# Patient Record
Sex: FEMALE | Race: WHITE | NOT HISPANIC OR LATINO | Employment: OTHER | URBAN - METROPOLITAN AREA
[De-identification: names, ages, dates, MRNs, and addresses within clinical notes are randomized per-mention and may not be internally consistent; named-entity substitution may affect disease eponyms.]

---

## 2022-09-20 ENCOUNTER — HOSPITAL ENCOUNTER (INPATIENT)
Facility: HOSPITAL | Age: 82
LOS: 3 days | Discharge: NURSING, REHAB OR LONG-TERM CARE | DRG: 522 | End: 2022-09-23
Attending: EMERGENCY MEDICINE | Admitting: INTERNAL MEDICINE
Payer: MEDICARE

## 2022-09-20 DIAGNOSIS — S72.002A CLOSED DISPLACED FRACTURE OF LEFT FEMORAL NECK: Primary | ICD-10-CM

## 2022-09-20 DIAGNOSIS — S72.032A CLOSED DISPLACED MIDCERVICAL FRACTURE OF LEFT FEMUR, INITIAL ENCOUNTER: ICD-10-CM

## 2022-09-20 DIAGNOSIS — S72.002A CLOSED FRACTURE OF LEFT HIP, INITIAL ENCOUNTER: Primary | ICD-10-CM

## 2022-09-20 DIAGNOSIS — Z01.818 PREOP EXAMINATION: ICD-10-CM

## 2022-09-20 LAB
ALBUMIN SERPL-MCNC: 3.6 GM/DL (ref 3.4–4.8)
ALBUMIN/GLOB SERPL: 0.9 RATIO (ref 1.1–2)
ALP SERPL-CCNC: 65 UNIT/L (ref 40–150)
ALT SERPL-CCNC: 9 UNIT/L (ref 0–55)
APTT PPP: 34.5 SECONDS (ref 23.4–33.9)
AST SERPL-CCNC: 15 UNIT/L (ref 5–34)
BASOPHILS # BLD AUTO: 0.05 X10(3)/MCL (ref 0–0.2)
BASOPHILS NFR BLD AUTO: 0.4 %
BILIRUBIN DIRECT+TOT PNL SERPL-MCNC: 1.1 MG/DL
BUN SERPL-MCNC: 21.9 MG/DL (ref 9.8–20.1)
CALCIUM SERPL-MCNC: 10 MG/DL (ref 8.4–10.2)
CHLORIDE SERPL-SCNC: 99 MMOL/L (ref 98–107)
CO2 SERPL-SCNC: 27 MMOL/L (ref 23–31)
CREAT SERPL-MCNC: 0.83 MG/DL (ref 0.55–1.02)
EOSINOPHIL # BLD AUTO: 0.05 X10(3)/MCL (ref 0–0.9)
EOSINOPHIL NFR BLD AUTO: 0.4 %
ERYTHROCYTE [DISTWIDTH] IN BLOOD BY AUTOMATED COUNT: 13.4 % (ref 11.5–17)
GFR SERPLBLD CREATININE-BSD FMLA CKD-EPI: >60 MLS/MIN/1.73/M2
GLOBULIN SER-MCNC: 4 GM/DL (ref 2.4–3.5)
GLUCOSE SERPL-MCNC: 105 MG/DL (ref 82–115)
HCT VFR BLD AUTO: 38.9 % (ref 37–47)
HGB BLD-MCNC: 13 GM/DL (ref 12–16)
IMM GRANULOCYTES # BLD AUTO: 0.04 X10(3)/MCL (ref 0–0.04)
IMM GRANULOCYTES NFR BLD AUTO: 0.3 %
INR BLD: 1.04 (ref 2–3)
LYMPHOCYTES # BLD AUTO: 1.05 X10(3)/MCL (ref 0.6–4.6)
LYMPHOCYTES NFR BLD AUTO: 8.8 %
MCH RBC QN AUTO: 29.5 PG (ref 27–31)
MCHC RBC AUTO-ENTMCNC: 33.4 MG/DL (ref 33–36)
MCV RBC AUTO: 88.2 FL (ref 80–94)
MONOCYTES # BLD AUTO: 1.24 X10(3)/MCL (ref 0.1–1.3)
MONOCYTES NFR BLD AUTO: 10.4 %
NEUTROPHILS # BLD AUTO: 9.5 X10(3)/MCL (ref 2.1–9.2)
NEUTROPHILS NFR BLD AUTO: 79.7 %
NRBC BLD AUTO-RTO: 0 %
PLATELET # BLD AUTO: 338 X10(3)/MCL (ref 130–400)
PMV BLD AUTO: 9.5 FL (ref 7.4–10.4)
POTASSIUM SERPL-SCNC: 4.4 MMOL/L (ref 3.5–5.1)
PROT SERPL-MCNC: 7.6 GM/DL (ref 5.8–7.6)
PROTHROMBIN TIME: 13.4 SECONDS (ref 11.7–14.5)
RBC # BLD AUTO: 4.41 X10(6)/MCL (ref 4.2–5.4)
SARS-COV-2 RDRP RESP QL NAA+PROBE: NEGATIVE
SODIUM SERPL-SCNC: 136 MMOL/L (ref 136–145)
WBC # SPEC AUTO: 12 X10(3)/MCL (ref 4.5–11.5)

## 2022-09-20 PROCEDURE — 99285 EMERGENCY DEPT VISIT HI MDM: CPT | Mod: 25

## 2022-09-20 PROCEDURE — 85025 COMPLETE CBC W/AUTO DIFF WBC: CPT | Performed by: EMERGENCY MEDICINE

## 2022-09-20 PROCEDURE — 25000003 PHARM REV CODE 250: Performed by: EMERGENCY MEDICINE

## 2022-09-20 PROCEDURE — 96375 TX/PRO/DX INJ NEW DRUG ADDON: CPT

## 2022-09-20 PROCEDURE — 85610 PROTHROMBIN TIME: CPT | Performed by: EMERGENCY MEDICINE

## 2022-09-20 PROCEDURE — 63600175 PHARM REV CODE 636 W HCPCS: Performed by: EMERGENCY MEDICINE

## 2022-09-20 PROCEDURE — 51702 INSERT TEMP BLADDER CATH: CPT

## 2022-09-20 PROCEDURE — 96374 THER/PROPH/DIAG INJ IV PUSH: CPT

## 2022-09-20 PROCEDURE — 85730 THROMBOPLASTIN TIME PARTIAL: CPT | Performed by: EMERGENCY MEDICINE

## 2022-09-20 PROCEDURE — 80053 COMPREHEN METABOLIC PANEL: CPT | Performed by: EMERGENCY MEDICINE

## 2022-09-20 PROCEDURE — 25000003 PHARM REV CODE 250: Performed by: INTERNAL MEDICINE

## 2022-09-20 PROCEDURE — 11000001 HC ACUTE MED/SURG PRIVATE ROOM

## 2022-09-20 PROCEDURE — 36415 COLL VENOUS BLD VENIPUNCTURE: CPT | Performed by: EMERGENCY MEDICINE

## 2022-09-20 PROCEDURE — 87635 SARS-COV-2 COVID-19 AMP PRB: CPT | Performed by: INTERNAL MEDICINE

## 2022-09-20 RX ORDER — LABETALOL HCL 20 MG/4 ML
10 SYRINGE (ML) INTRAVENOUS EVERY 4 HOURS PRN
Status: DISCONTINUED | OUTPATIENT
Start: 2022-09-20 | End: 2022-09-23 | Stop reason: HOSPADM

## 2022-09-20 RX ORDER — HYDROMORPHONE HYDROCHLORIDE 2 MG/ML
1 INJECTION, SOLUTION INTRAMUSCULAR; INTRAVENOUS; SUBCUTANEOUS EVERY 6 HOURS PRN
Status: DISCONTINUED | OUTPATIENT
Start: 2022-09-20 | End: 2022-09-23

## 2022-09-20 RX ORDER — FENTANYL CITRATE 50 UG/ML
100 INJECTION, SOLUTION INTRAMUSCULAR; INTRAVENOUS
Status: COMPLETED | OUTPATIENT
Start: 2022-09-20 | End: 2022-09-20

## 2022-09-20 RX ORDER — SODIUM CHLORIDE 0.9 % (FLUSH) 0.9 %
10 SYRINGE (ML) INJECTION
Status: DISCONTINUED | OUTPATIENT
Start: 2022-09-20 | End: 2022-09-23 | Stop reason: HOSPADM

## 2022-09-20 RX ORDER — SODIUM CHLORIDE 9 MG/ML
1000 INJECTION, SOLUTION INTRAVENOUS
Status: COMPLETED | OUTPATIENT
Start: 2022-09-20 | End: 2022-09-20

## 2022-09-20 RX ORDER — ONDANSETRON 2 MG/ML
4 INJECTION INTRAMUSCULAR; INTRAVENOUS
Status: COMPLETED | OUTPATIENT
Start: 2022-09-20 | End: 2022-09-20

## 2022-09-20 RX ORDER — TALC
6 POWDER (GRAM) TOPICAL NIGHTLY PRN
Status: DISCONTINUED | OUTPATIENT
Start: 2022-09-20 | End: 2022-09-23 | Stop reason: HOSPADM

## 2022-09-20 RX ORDER — HYDROCODONE BITARTRATE AND ACETAMINOPHEN 5; 325 MG/1; MG/1
1 TABLET ORAL EVERY 6 HOURS PRN
Status: DISCONTINUED | OUTPATIENT
Start: 2022-09-20 | End: 2022-09-23

## 2022-09-20 RX ORDER — HYDRALAZINE HYDROCHLORIDE 20 MG/ML
10 INJECTION INTRAMUSCULAR; INTRAVENOUS EVERY 4 HOURS PRN
Status: DISCONTINUED | OUTPATIENT
Start: 2022-09-20 | End: 2022-09-23 | Stop reason: HOSPADM

## 2022-09-20 RX ORDER — FAMOTIDINE 20 MG/1
20 TABLET, FILM COATED ORAL 2 TIMES DAILY
Status: DISCONTINUED | OUTPATIENT
Start: 2022-09-20 | End: 2022-09-23 | Stop reason: HOSPADM

## 2022-09-20 RX ORDER — ACETAMINOPHEN 500 MG
500 TABLET ORAL EVERY 6 HOURS PRN
Status: DISCONTINUED | OUTPATIENT
Start: 2022-09-20 | End: 2022-09-23 | Stop reason: HOSPADM

## 2022-09-20 RX ADMIN — FAMOTIDINE 20 MG: 20 TABLET, FILM COATED ORAL at 08:09

## 2022-09-20 RX ADMIN — HYDROCODONE BITARTRATE AND ACETAMINOPHEN 1 TABLET: 5; 325 TABLET ORAL at 08:09

## 2022-09-20 RX ADMIN — SODIUM CHLORIDE 1000 ML: 9 INJECTION, SOLUTION INTRAVENOUS at 04:09

## 2022-09-20 RX ADMIN — ONDANSETRON 4 MG: 2 INJECTION INTRAMUSCULAR; INTRAVENOUS at 03:09

## 2022-09-20 RX ADMIN — FENTANYL CITRATE 100 MCG: 50 INJECTION, SOLUTION INTRAMUSCULAR; INTRAVENOUS at 03:09

## 2022-09-20 NOTE — ED TRIAGE NOTES
Pain Pt states that she was told to got to the ED by Dr Bruce with left Hip Fractrue as pt fell when she became entangled in walker while doing laundry appprox 12 days ago.

## 2022-09-20 NOTE — ED PROVIDER NOTES
Encounter Date: 9/20/2022       History     Chief Complaint   Patient presents with    Hip Pain     Pt states that she was told to got to the ED by Dr Bruce with left Hip Fractrue as pt fell when she became entangled in walker while doing laundry appprox 12 days ago.     81-year-old female states that she fell about 12 days ago while doing laundry and sustained a hip fracture.  She states the pain was not too bad and she was able to ambulate but then the pain worsened and she had a repeat Xray.  Evidently her doctor since the x-rays to Dr. Christensen and she was advised to come to the ER for further treatment.  She denies any other injuries.      Review of patient's allergies indicates:  No Known Allergies  Past Medical History:   Diagnosis Date    Hypertension     Mixed hyperlipidemia      Past Surgical History:   Procedure Laterality Date    cyst on tailbone      right hip replacement       Family History   Problem Relation Age of Onset    Hypertension Mother     Hypertension Father      Social History     Tobacco Use    Smoking status: Every Day     Packs/day: 1.00     Types: Cigarettes    Smokeless tobacco: Never   Substance Use Topics    Alcohol use: Yes     Comment: 3 glass wine a year    Drug use: Never     Review of Systems   Musculoskeletal:  Positive for arthralgias.   All other systems reviewed and are negative.    Physical Exam     Initial Vitals [09/20/22 1353]   BP Pulse Resp Temp SpO2   (!) 111/54 66 18 98.6 °F (37 °C) 96 %      MAP       --         Physical Exam    Nursing note and vitals reviewed.  Constitutional: She appears well-developed and well-nourished. She is not diaphoretic. No distress.   HENT:   Head: Normocephalic and atraumatic.   Mouth/Throat: Oropharynx is clear and moist.   Eyes: Conjunctivae are normal. Pupils are equal, round, and reactive to light.   Neck: Neck supple.   Cardiovascular:  Normal rate, regular rhythm, normal heart sounds and intact distal pulses.            Pulmonary/Chest: Breath sounds normal. No respiratory distress. She has no wheezes. She has no rhonchi. She has no rales.   Abdominal: Abdomen is soft. Bowel sounds are normal. She exhibits no distension. There is no abdominal tenderness. There is no guarding.   Musculoskeletal:      Cervical back: Neck supple.      Comments: Tender to the left hip with no visible bruising or swelling.  Any range of motion causes hip pain.  Pedal pulses 2+ with normal sensation throughout the leg.     Neurological: She is alert and oriented to person, place, and time.   Skin: Skin is warm and dry. Capillary refill takes less than 2 seconds. No rash noted.   Psychiatric: She has a normal mood and affect. Thought content normal.       ED Course   Procedures  Labs Reviewed   CBC WITH DIFFERENTIAL - Abnormal; Notable for the following components:       Result Value    WBC 12.0 (*)     Neut # 9.5 (*)     All other components within normal limits   COMPREHENSIVE METABOLIC PANEL - Abnormal; Notable for the following components:    Blood Urea Nitrogen 21.9 (*)     Globulin 4.0 (*)     Albumin/Globulin Ratio 0.9 (*)     All other components within normal limits   APTT - Abnormal; Notable for the following components:    PTT 34.5 (*)     All other components within normal limits   PROTIME-INR - Abnormal; Notable for the following components:    INR 1.04 (*)     All other components within normal limits   SARS-COV-2 RNA AMPLIFICATION, QUAL   SARS-COV-2 RDRP GENE          Imaging Results              X-Ray Hip 2 or 3 views Left (with Pelvis when performed) (Final result)  Result time 09/20/22 16:23:34      Final result by Deondre Baltazar MD (09/20/22 16:23:34)                   Narrative:    EXAMINATION  XR HIP WITH PELVIS WHEN PERFORMED, 2 OR 3 VIEWS LEFT    CLINICAL HISTORY  fall; ongoing pain after fall 1 week ago    TECHNIQUE  A total of 3 images submitted of the hip/pelvis.    COMPARISON  None available at the time of initial  interpretation.    FINDINGS  Acutely displaced, comminuted and impacted fracture of the subcapital left femoral neck is noted.  No other acute cortical disruption is identified.  There is no joint incongruity.  Right hip arthroplasty components are incidentally visualized, no suspicious radiographic findings.  There are degenerative alterations throughout the regional osseous structures.    The included soft tissues are without acute abnormality.    IMPRESSION  Acute left femoral neck fracture.      Electronically signed by: Deondre Baltazar  Date:    09/20/2022  Time:    16:23                                     Medications   sodium chloride 0.9% flush 10 mL (has no administration in time range)   melatonin tablet 6 mg (has no administration in time range)   famotidine tablet 20 mg (has no administration in time range)   HYDROmorphone (PF) injection 1 mg (has no administration in time range)   HYDROcodone-acetaminophen 5-325 mg per tablet 1 tablet (has no administration in time range)   acetaminophen tablet 500 mg (has no administration in time range)   labetalol 20 mg/4 mL (5 mg/mL) IV syring (has no administration in time range)   hydrALAZINE injection 10 mg (has no administration in time range)   fentaNYL injection 100 mcg (100 mcg Intravenous Given 9/20/22 1533)   ondansetron injection 4 mg (4 mg Intravenous Given 9/20/22 1532)   0.9%  NaCl infusion (1,000 mLs Intravenous New Bag 9/20/22 1645)     Medical Decision Making:   Initial Assessment:   6:24 PM  81-year-old female with a history of hypertension hyperlipidemia had a fall 12 days ago sustaining a hip fracture, presenting today due to worsening pain and inability to walk.  Differential Diagnosis:   Hip fracture, hip dislocation  Clinical Tests:   Lab Tests: Reviewed  Radiological Study: Reviewed  ED Management:  Patient was given fentanyl 100 mcg IV and desatted down to the 80s and became overly sedated.  She was placed on oxygen and this medication wore off  quickly and have notified Dr. Bob and Dr Christensen that she is a bit sensitive to narcotics.  Other:   I have discussed this case with another health care provider.       <> Summary of the Discussion: Case discussed with Dr. Christensen and Dr. Bob and patient will be admitted to [facility] to have hip replacement surgery in the morning.                        Clinical Impression:   Final diagnoses:  [S72.002A] Closed fracture of left hip, initial encounter (Primary)      ED Disposition Condition    Admit Stable                Marisa Madden MD  09/20/22 5974

## 2022-09-20 NOTE — H&P
[facility] EMERGENCY DEPARTMENT    Hospital Medicine History & Physical Examination       Patient Name: Yoan Xavier  MRN: [id number]  Patient Class: IP- Inpatient   Admission Date: 9/20/2022   Admitting Physician: SANDI Service   Length of Stay: 0  Attending Physician: Justus Bob MD  Primary Care Provider: Ronal Zuluaga MD  Face-to-Face encounter date: 09/20/2022    Code Status: Full Code    Chief Complaint: Hip Pain (Pt states that she was told to got to the ED by Dr Bruce with left Hip Fractrue as pt fell when she became entangled in walker while doing laundry appprox 12 days ago.)          HISTORY OF PRESENT ILLNESS:   Yoan Xavier is a 81 y.o. female who  has a past medical history of Hypertension and Mixed hyperlipidemia.. The patient presented to [facility] on 9/20/2022 with a primary complaint of hip pain.  Pt tripped in home 12 days ago, she felt left hip pain but was able to walk around and do her daily activities using her walker.  She seen her pcp in [place] about a week ago and xray showed hip fracture but she decided to see if it would heal on its own.  Pain persisted and increased and she presented to ED today.  Plan for surgery tomorrow.    PAST MEDICAL HISTORY:     Past Medical History:   Diagnosis Date    Hypertension     Mixed hyperlipidemia        PAST SURGICAL HISTORY:     Past Surgical History:   Procedure Laterality Date    cyst on tailbone      right hip replacement         ALLERGIES:   Patient has no known allergies.    FAMILY HISTORY:   family history includes Hypertension in her father and mother.    SOCIAL HISTORY:     Social History     Tobacco Use    Smoking status: Every Day     Packs/day: 1.00     Types: Cigarettes    Smokeless tobacco: Never   Substance Use Topics    Alcohol use: Yes     Comment: 3 glass wine a year        HOME MEDICATIONS:     Prior to Admission medications    Not on File       REVIEW OF SYSTEMS:   Except as  documented, all other systems reviewed and negative   Review of Systems   All other systems reviewed and are negative.      PHYSICAL EXAM:     VITAL SIGNS: 24 HRS MIN & MAX LAST   Temp  Min: 98.6 °F (37 °C)  Max: 98.6 °F (37 °C) 98.6 °F (37 °C)   BP  Min: 111/54  Max: 111/54 (!) 111/54     Pulse  Min: 66  Max: 66  66   Resp  Min: 18  Max: 24 (!) 24     SpO2  Min: 96 %  Max: 96 % 96 %       General appearance: Well-developed, well-nourished female in no apparent distress.  HENT: Atraumatic head. Moist mucous membranes of oral cavity.  Eyes: Normal extraocular movements.   Neck: Supple.   Lungs: Clear to auscultation bilaterally. No wheezing present.   Heart: Regular rate and rhythm. S1 and S2 present with no murmurs/gallop/rub. No pedal edema. No JVD present.   Abdomen: Soft, non-distended, non-tender. No rebound tenderness/guarding. Bowel sounds are normal.   Extremities: left hip tenderness.  Skin: No Rash.   Neuro: no focal deficits  Psych/mental status: Appropriate mood and affect. Responds appropriately to questions.     LABS AND IMAGING:     Recent Labs   Lab 09/20/22  1546   WBC 12.0*   RBC 4.41   HGB 13.0   HCT 38.9   MCV 88.2   MCH 29.5   MCHC 33.4   RDW 13.4      MPV 9.5       Recent Labs   Lab 09/20/22  1547      K 4.4   CO2 27   BUN 21.9*   CREATININE 0.83   CALCIUM 10.0   ALBUMIN 3.6   ALKPHOS 65   ALT 9   AST 15   BILITOT 1.1       Microbiology Results (last 7 days)       ** No results found for the last 168 hours. **             X-Ray Hip 2 or 3 views Left (with Pelvis when performed)  EXAMINATION  XR HIP WITH PELVIS WHEN PERFORMED, 2 OR 3 VIEWS LEFT    CLINICAL HISTORY  fall; ongoing pain after fall 1 week ago    TECHNIQUE  A total of 3 images submitted of the hip/pelvis.    COMPARISON  None available at the time of initial interpretation.    FINDINGS  Acutely displaced, comminuted and impacted fracture of the subcapital left femoral neck is noted.  No other acute cortical disruption is  identified.  There is no joint incongruity.  Right hip arthroplasty components are incidentally visualized, no suspicious radiographic findings.  There are degenerative alterations throughout the regional osseous structures.    The included soft tissues are without acute abnormality.    IMPRESSION  Acute left femoral neck fracture.    Electronically signed by: Deondre Baltazar  Date:    09/20/2022  Time:    16:23        __________________________________________________________________________  INPATIENT LIST OF MEDICATIONS     Scheduled Meds:   famotidine  20 mg Oral BID     Continuous Infusions:  PRN Meds:acetaminophen, hydrALAZINE, HYDROcodone-acetaminophen, HYDROmorphone, labetalol, melatonin, sodium chloride 0.9%          ASSESSMENT & PLAN:   Left femoral neck fracture  Htn  Hld  Tobacco abuse    Plan    Consult ortho  Pain control  Obtain med list  Prn hydralazine/labetalol    Gi proph: pepcid  Dvt proph: giselle Bob MD   09/20/2022

## 2022-09-21 ENCOUNTER — ANESTHESIA (OUTPATIENT)
Dept: SURGERY | Facility: HOSPITAL | Age: 82
DRG: 522 | End: 2022-09-21
Payer: MEDICARE

## 2022-09-21 ENCOUNTER — ANESTHESIA EVENT (OUTPATIENT)
Dept: SURGERY | Facility: HOSPITAL | Age: 82
DRG: 522 | End: 2022-09-21
Payer: MEDICARE

## 2022-09-21 PROBLEM — S72.002A CLOSED FRACTURE OF LEFT HIP: Status: ACTIVE | Noted: 2022-09-21

## 2022-09-21 LAB
HCT VFR BLD AUTO: 35.1 % (ref 37–47)
HGB BLD-MCNC: 11.4 GM/DL (ref 12–16)

## 2022-09-21 PROCEDURE — 27236 TREAT THIGH FRACTURE: CPT | Mod: AS,RT,, | Performed by: NURSE PRACTITIONER

## 2022-09-21 PROCEDURE — 25000003 PHARM REV CODE 250: Performed by: ORTHOPAEDIC SURGERY

## 2022-09-21 PROCEDURE — 88305 TISSUE EXAM BY PATHOLOGIST: CPT | Performed by: ORTHOPAEDIC SURGERY

## 2022-09-21 PROCEDURE — 99900035 HC TECH TIME PER 15 MIN (STAT)

## 2022-09-21 PROCEDURE — 27236 PR FEMORAL FX, OPEN TX: ICD-10-PCS | Mod: AS,RT,, | Performed by: NURSE PRACTITIONER

## 2022-09-21 PROCEDURE — 27000221 HC OXYGEN, UP TO 24 HOURS

## 2022-09-21 PROCEDURE — 85014 HEMATOCRIT: CPT | Performed by: ORTHOPAEDIC SURGERY

## 2022-09-21 PROCEDURE — 63600175 PHARM REV CODE 636 W HCPCS: Performed by: INTERNAL MEDICINE

## 2022-09-21 PROCEDURE — C1776 JOINT DEVICE (IMPLANTABLE): HCPCS | Performed by: ORTHOPAEDIC SURGERY

## 2022-09-21 PROCEDURE — 97162 PT EVAL MOD COMPLEX 30 MIN: CPT

## 2022-09-21 PROCEDURE — 63600175 PHARM REV CODE 636 W HCPCS: Performed by: NURSE ANESTHETIST, CERTIFIED REGISTERED

## 2022-09-21 PROCEDURE — C1713 ANCHOR/SCREW BN/BN,TIS/BN: HCPCS | Performed by: ORTHOPAEDIC SURGERY

## 2022-09-21 PROCEDURE — 63600175 PHARM REV CODE 636 W HCPCS

## 2022-09-21 PROCEDURE — 94799 UNLISTED PULMONARY SVC/PX: CPT

## 2022-09-21 PROCEDURE — 36000710: Performed by: ORTHOPAEDIC SURGERY

## 2022-09-21 PROCEDURE — 93010 EKG 12-LEAD: ICD-10-PCS | Mod: ,,, | Performed by: INTERNAL MEDICINE

## 2022-09-21 PROCEDURE — 71000033 HC RECOVERY, INTIAL HOUR: Performed by: ORTHOPAEDIC SURGERY

## 2022-09-21 PROCEDURE — 94761 N-INVAS EAR/PLS OXIMETRY MLT: CPT

## 2022-09-21 PROCEDURE — 88311 DECALCIFY TISSUE: CPT | Performed by: ORTHOPAEDIC SURGERY

## 2022-09-21 PROCEDURE — 37000008 HC ANESTHESIA 1ST 15 MINUTES: Performed by: ORTHOPAEDIC SURGERY

## 2022-09-21 PROCEDURE — 93010 ELECTROCARDIOGRAM REPORT: CPT | Mod: ,,, | Performed by: INTERNAL MEDICINE

## 2022-09-21 PROCEDURE — 93005 ELECTROCARDIOGRAM TRACING: CPT

## 2022-09-21 PROCEDURE — 25000003 PHARM REV CODE 250: Performed by: INTERNAL MEDICINE

## 2022-09-21 PROCEDURE — 36415 COLL VENOUS BLD VENIPUNCTURE: CPT | Performed by: ORTHOPAEDIC SURGERY

## 2022-09-21 PROCEDURE — 27236 PR FEMORAL FX, OPEN TX: ICD-10-PCS | Mod: LT,,, | Performed by: ORTHOPAEDIC SURGERY

## 2022-09-21 PROCEDURE — 11000001 HC ACUTE MED/SURG PRIVATE ROOM

## 2022-09-21 PROCEDURE — 27201423 OPTIME MED/SURG SUP & DEVICES STERILE SUPPLY: Performed by: ORTHOPAEDIC SURGERY

## 2022-09-21 PROCEDURE — 27236 TREAT THIGH FRACTURE: CPT | Mod: LT,,, | Performed by: ORTHOPAEDIC SURGERY

## 2022-09-21 PROCEDURE — 36000711: Performed by: ORTHOPAEDIC SURGERY

## 2022-09-21 PROCEDURE — 25000003 PHARM REV CODE 250: Performed by: NURSE ANESTHETIST, CERTIFIED REGISTERED

## 2022-09-21 PROCEDURE — 30000890 HC MISC. SEND OUT TEST: Performed by: ORTHOPAEDIC SURGERY

## 2022-09-21 PROCEDURE — 30000890 SPECIMEN TO PATHOLOGY: Performed by: ORTHOPAEDIC SURGERY

## 2022-09-21 PROCEDURE — 63600175 PHARM REV CODE 636 W HCPCS: Performed by: ORTHOPAEDIC SURGERY

## 2022-09-21 PROCEDURE — C1889 IMPLANT/INSERT DEVICE, NOC: HCPCS | Performed by: ORTHOPAEDIC SURGERY

## 2022-09-21 PROCEDURE — 37000009 HC ANESTHESIA EA ADD 15 MINS: Performed by: ORTHOPAEDIC SURGERY

## 2022-09-21 DEVICE — BIPOLAR COMPONENT
Type: IMPLANTABLE DEVICE | Site: HIP | Status: FUNCTIONAL
Brand: UHR

## 2022-09-21 DEVICE — HIP STEM - HIGH OFFSET
Type: IMPLANTABLE DEVICE | Site: HIP | Status: FUNCTIONAL
Brand: INSIGNIA

## 2022-09-21 DEVICE — CERAMIC V40 FEMORAL HEAD
Type: IMPLANTABLE DEVICE | Site: HIP | Status: FUNCTIONAL
Brand: BIOLOX

## 2022-09-21 RX ORDER — QUINAPRIL 40 MG/1
40 TABLET ORAL DAILY
Status: ON HOLD | COMMUNITY
Start: 2022-08-17 | End: 2022-09-29 | Stop reason: SDUPTHER

## 2022-09-21 RX ORDER — ONDANSETRON 2 MG/ML
INJECTION INTRAMUSCULAR; INTRAVENOUS
Status: DISCONTINUED | OUTPATIENT
Start: 2022-09-21 | End: 2022-09-21

## 2022-09-21 RX ORDER — SODIUM CHLORIDE, SODIUM LACTATE, POTASSIUM CHLORIDE, CALCIUM CHLORIDE 600; 310; 30; 20 MG/100ML; MG/100ML; MG/100ML; MG/100ML
INJECTION, SOLUTION INTRAVENOUS CONTINUOUS
Status: DISCONTINUED | OUTPATIENT
Start: 2022-09-21 | End: 2022-09-21

## 2022-09-21 RX ORDER — PHENYLEPHRINE HYDROCHLORIDE 10 MG/ML
INJECTION INTRAVENOUS
Status: DISCONTINUED | OUTPATIENT
Start: 2022-09-21 | End: 2022-09-21

## 2022-09-21 RX ORDER — CARVEDILOL 12.5 MG/1
12.5 TABLET ORAL 2 TIMES DAILY
Status: ON HOLD | COMMUNITY
Start: 2022-08-17 | End: 2022-09-29 | Stop reason: SDUPTHER

## 2022-09-21 RX ORDER — AMLODIPINE BESYLATE 5 MG/1
5 TABLET ORAL DAILY
Status: DISCONTINUED | OUTPATIENT
Start: 2022-09-21 | End: 2022-09-23 | Stop reason: HOSPADM

## 2022-09-21 RX ORDER — ONDANSETRON 2 MG/ML
4 INJECTION INTRAMUSCULAR; INTRAVENOUS EVERY 6 HOURS PRN
Status: DISCONTINUED | OUTPATIENT
Start: 2022-09-21 | End: 2022-09-23 | Stop reason: HOSPADM

## 2022-09-21 RX ORDER — METOCLOPRAMIDE HYDROCHLORIDE 5 MG/ML
10 INJECTION INTRAMUSCULAR; INTRAVENOUS
Status: COMPLETED | OUTPATIENT
Start: 2022-09-21 | End: 2022-09-22

## 2022-09-21 RX ORDER — DEXAMETHASONE SODIUM PHOSPHATE 4 MG/ML
INJECTION, SOLUTION INTRA-ARTICULAR; INTRALESIONAL; INTRAMUSCULAR; INTRAVENOUS; SOFT TISSUE
Status: DISCONTINUED | OUTPATIENT
Start: 2022-09-21 | End: 2022-09-21

## 2022-09-21 RX ORDER — AMLODIPINE BESYLATE 5 MG/1
5 TABLET ORAL DAILY
Status: ON HOLD | COMMUNITY
Start: 2022-08-17 | End: 2022-09-29 | Stop reason: SDUPTHER

## 2022-09-21 RX ORDER — CEFAZOLIN SODIUM 1 G/3ML
INJECTION, POWDER, FOR SOLUTION INTRAMUSCULAR; INTRAVENOUS
Status: DISCONTINUED | OUTPATIENT
Start: 2022-09-21 | End: 2022-09-21

## 2022-09-21 RX ORDER — BUPROPION HYDROCHLORIDE 300 MG/1
300 TABLET ORAL DAILY
Status: ON HOLD | COMMUNITY
Start: 2022-08-17 | End: 2022-09-29 | Stop reason: SDUPTHER

## 2022-09-21 RX ORDER — HYDROMORPHONE HYDROCHLORIDE 2 MG/ML
0.4 INJECTION, SOLUTION INTRAMUSCULAR; INTRAVENOUS; SUBCUTANEOUS EVERY 5 MIN PRN
Status: DISCONTINUED | OUTPATIENT
Start: 2022-09-21 | End: 2022-09-23

## 2022-09-21 RX ORDER — ONDANSETRON 2 MG/ML
4 INJECTION INTRAMUSCULAR; INTRAVENOUS DAILY PRN
Status: DISCONTINUED | OUTPATIENT
Start: 2022-09-21 | End: 2022-09-23 | Stop reason: HOSPADM

## 2022-09-21 RX ORDER — SODIUM CHLORIDE, SODIUM GLUCONATE, SODIUM ACETATE, POTASSIUM CHLORIDE AND MAGNESIUM CHLORIDE 30; 37; 368; 526; 502 MG/100ML; MG/100ML; MG/100ML; MG/100ML; MG/100ML
1000 INJECTION, SOLUTION INTRAVENOUS CONTINUOUS
Status: DISCONTINUED | OUTPATIENT
Start: 2022-09-21 | End: 2022-09-21

## 2022-09-21 RX ORDER — POLYETHYLENE GLYCOL 3350 17 G/17G
17 POWDER, FOR SOLUTION ORAL NIGHTLY
Status: DISCONTINUED | OUTPATIENT
Start: 2022-09-21 | End: 2022-09-23 | Stop reason: HOSPADM

## 2022-09-21 RX ORDER — LACTULOSE 10 G/15ML
20 SOLUTION ORAL EVERY 6 HOURS PRN
Status: DISCONTINUED | OUTPATIENT
Start: 2022-09-21 | End: 2022-09-23 | Stop reason: HOSPADM

## 2022-09-21 RX ORDER — METHOCARBAMOL 750 MG/1
750 TABLET, FILM COATED ORAL EVERY 8 HOURS PRN
Status: DISCONTINUED | OUTPATIENT
Start: 2022-09-21 | End: 2022-09-23 | Stop reason: HOSPADM

## 2022-09-21 RX ORDER — BUDESONIDE AND FORMOTEROL FUMARATE DIHYDRATE 160; 4.5 UG/1; UG/1
2 AEROSOL RESPIRATORY (INHALATION) 2 TIMES DAILY
Status: ON HOLD | COMMUNITY
Start: 2022-07-20 | End: 2022-09-29

## 2022-09-21 RX ORDER — GABAPENTIN 300 MG/1
300 CAPSULE ORAL NIGHTLY
Status: DISCONTINUED | OUTPATIENT
Start: 2022-09-21 | End: 2022-09-23 | Stop reason: HOSPADM

## 2022-09-21 RX ORDER — ACETAMINOPHEN 500 MG
500 TABLET ORAL EVERY 4 HOURS
Status: DISCONTINUED | OUTPATIENT
Start: 2022-09-22 | End: 2022-09-23

## 2022-09-21 RX ORDER — ATORVASTATIN CALCIUM 40 MG/1
40 TABLET, FILM COATED ORAL DAILY
Status: ON HOLD | COMMUNITY
Start: 2022-08-17 | End: 2022-09-29 | Stop reason: SDUPTHER

## 2022-09-21 RX ORDER — ACETAMINOPHEN 500 MG
5 TABLET ORAL NIGHTLY
Status: ON HOLD | COMMUNITY
End: 2022-09-29

## 2022-09-21 RX ORDER — KETOROLAC TROMETHAMINE 30 MG/ML
15 INJECTION, SOLUTION INTRAMUSCULAR; INTRAVENOUS EVERY 6 HOURS
Status: COMPLETED | OUTPATIENT
Start: 2022-09-21 | End: 2022-09-22

## 2022-09-21 RX ORDER — NAPROXEN SODIUM 220 MG/1
81 TABLET, FILM COATED ORAL 2 TIMES DAILY
Status: DISCONTINUED | OUTPATIENT
Start: 2022-09-22 | End: 2022-09-23 | Stop reason: HOSPADM

## 2022-09-21 RX ORDER — MIRTAZAPINE 30 MG/1
30 TABLET, FILM COATED ORAL NIGHTLY
Status: ON HOLD | COMMUNITY
Start: 2022-08-17 | End: 2022-09-29 | Stop reason: SDUPTHER

## 2022-09-21 RX ORDER — SODIUM CHLORIDE 9 MG/ML
INJECTION, SOLUTION INTRAVENOUS CONTINUOUS
Status: DISCONTINUED | OUTPATIENT
Start: 2022-09-21 | End: 2022-09-23 | Stop reason: HOSPADM

## 2022-09-21 RX ORDER — ATORVASTATIN CALCIUM 40 MG/1
40 TABLET, FILM COATED ORAL DAILY
Status: DISCONTINUED | OUTPATIENT
Start: 2022-09-21 | End: 2022-09-23 | Stop reason: HOSPADM

## 2022-09-21 RX ORDER — PROCHLORPERAZINE EDISYLATE 5 MG/ML
5 INJECTION INTRAMUSCULAR; INTRAVENOUS EVERY 30 MIN PRN
Status: DISCONTINUED | OUTPATIENT
Start: 2022-09-21 | End: 2022-09-23 | Stop reason: HOSPADM

## 2022-09-21 RX ORDER — AMOXICILLIN 250 MG
2 CAPSULE ORAL 2 TIMES DAILY
Status: DISCONTINUED | OUTPATIENT
Start: 2022-09-21 | End: 2022-09-23 | Stop reason: HOSPADM

## 2022-09-21 RX ORDER — DOCUSATE SODIUM 100 MG/1
200 CAPSULE, LIQUID FILLED ORAL DAILY
Status: DISCONTINUED | OUTPATIENT
Start: 2022-09-22 | End: 2022-09-23 | Stop reason: HOSPADM

## 2022-09-21 RX ORDER — MAG HYDROX/ALUMINUM HYD/SIMETH 200-200-20
30 SUSPENSION, ORAL (FINAL DOSE FORM) ORAL EVERY 6 HOURS PRN
Status: DISCONTINUED | OUTPATIENT
Start: 2022-09-21 | End: 2022-09-23 | Stop reason: HOSPADM

## 2022-09-21 RX ORDER — CEFAZOLIN SODIUM 2 G/100ML
2 INJECTION, SOLUTION INTRAVENOUS
Status: COMPLETED | OUTPATIENT
Start: 2022-09-21 | End: 2022-09-22

## 2022-09-21 RX ORDER — CALCIUM CARBONATE 200(500)MG
500 TABLET,CHEWABLE ORAL 3 TIMES DAILY PRN
Status: DISCONTINUED | OUTPATIENT
Start: 2022-09-21 | End: 2022-09-23 | Stop reason: HOSPADM

## 2022-09-21 RX ORDER — MIDAZOLAM HYDROCHLORIDE 1 MG/ML
INJECTION INTRAMUSCULAR; INTRAVENOUS
Status: COMPLETED
Start: 2022-09-21 | End: 2022-09-21

## 2022-09-21 RX ORDER — MIDAZOLAM HYDROCHLORIDE 1 MG/ML
INJECTION INTRAMUSCULAR; INTRAVENOUS
Status: DISCONTINUED | OUTPATIENT
Start: 2022-09-21 | End: 2022-09-21

## 2022-09-21 RX ORDER — MUPIROCIN 20 MG/G
OINTMENT TOPICAL 2 TIMES DAILY
Status: DISCONTINUED | OUTPATIENT
Start: 2022-09-21 | End: 2022-09-23 | Stop reason: HOSPADM

## 2022-09-21 RX ORDER — ACETAMINOPHEN 10 MG/ML
1000 INJECTION, SOLUTION INTRAVENOUS ONCE
Status: COMPLETED | OUTPATIENT
Start: 2022-09-21 | End: 2022-09-21

## 2022-09-21 RX ORDER — BUPIVACAINE HYDROCHLORIDE 7.5 MG/ML
INJECTION, SOLUTION EPIDURAL; RETROBULBAR
Status: COMPLETED | OUTPATIENT
Start: 2022-09-21 | End: 2022-09-21

## 2022-09-21 RX ORDER — PROPOFOL 10 MG/ML
VIAL (ML) INTRAVENOUS
Status: DISCONTINUED | OUTPATIENT
Start: 2022-09-21 | End: 2022-09-21

## 2022-09-21 RX ORDER — MIDAZOLAM HYDROCHLORIDE 1 MG/ML
2 INJECTION INTRAMUSCULAR; INTRAVENOUS ONCE AS NEEDED
Status: COMPLETED | OUTPATIENT
Start: 2022-09-21 | End: 2022-09-21

## 2022-09-21 RX ADMIN — PHENYLEPHRINE HYDROCHLORIDE 100 MCG: 10 INJECTION INTRAVENOUS at 12:09

## 2022-09-21 RX ADMIN — POLYETHYLENE GLYCOL 3350 17 G: 17 POWDER, FOR SOLUTION ORAL at 08:09

## 2022-09-21 RX ADMIN — SODIUM CHLORIDE, SODIUM GLUCONATE, SODIUM ACETATE, POTASSIUM CHLORIDE AND MAGNESIUM CHLORIDE: 526; 502; 368; 37; 30 INJECTION, SOLUTION INTRAVENOUS at 12:09

## 2022-09-21 RX ADMIN — HYDROCODONE BITARTRATE AND ACETAMINOPHEN 1 TABLET: 5; 325 TABLET ORAL at 05:09

## 2022-09-21 RX ADMIN — MIDAZOLAM 2 MG: 1 INJECTION INTRAMUSCULAR; INTRAVENOUS at 12:09

## 2022-09-21 RX ADMIN — MIDAZOLAM HYDROCHLORIDE 2 MG: 1 INJECTION INTRAMUSCULAR; INTRAVENOUS at 12:09

## 2022-09-21 RX ADMIN — SODIUM CHLORIDE: 9 INJECTION, SOLUTION INTRAVENOUS at 05:09

## 2022-09-21 RX ADMIN — PHENYLEPHRINE HYDROCHLORIDE 200 MCG: 10 INJECTION INTRAVENOUS at 01:09

## 2022-09-21 RX ADMIN — AMLODIPINE BESYLATE 5 MG: 5 TABLET ORAL at 11:09

## 2022-09-21 RX ADMIN — DEXAMETHASONE SODIUM PHOSPHATE 4 MG: 4 INJECTION, SOLUTION INTRA-ARTICULAR; INTRALESIONAL; INTRAMUSCULAR; INTRAVENOUS; SOFT TISSUE at 01:09

## 2022-09-21 RX ADMIN — CEFAZOLIN SODIUM 2000 MG: 2 INJECTION, SOLUTION INTRAVENOUS at 08:09

## 2022-09-21 RX ADMIN — PHENYLEPHRINE HYDROCHLORIDE 100 MCG: 10 INJECTION INTRAVENOUS at 01:09

## 2022-09-21 RX ADMIN — METOCLOPRAMIDE 10 MG: 5 INJECTION, SOLUTION INTRAMUSCULAR; INTRAVENOUS at 08:09

## 2022-09-21 RX ADMIN — ACETAMINOPHEN 1000 MG: 10 INJECTION INTRAVENOUS at 10:09

## 2022-09-21 RX ADMIN — ONDANSETRON HYDROCHLORIDE 4 MG: 2 SOLUTION INTRAMUSCULAR; INTRAVENOUS at 01:09

## 2022-09-21 RX ADMIN — CEFAZOLIN 2 G: 330 INJECTION, POWDER, FOR SOLUTION INTRAMUSCULAR; INTRAVENOUS at 01:09

## 2022-09-21 RX ADMIN — SODIUM CHLORIDE, SODIUM GLUCONATE, SODIUM ACETATE, POTASSIUM CHLORIDE AND MAGNESIUM CHLORIDE: 526; 502; 368; 37; 30 INJECTION, SOLUTION INTRAVENOUS at 01:09

## 2022-09-21 RX ADMIN — KETOROLAC TROMETHAMINE 15 MG: 30 INJECTION, SOLUTION INTRAMUSCULAR; INTRAVENOUS at 10:09

## 2022-09-21 RX ADMIN — HYDROCODONE BITARTRATE AND ACETAMINOPHEN 1 TABLET: 5; 325 TABLET ORAL at 06:09

## 2022-09-21 RX ADMIN — MUPIROCIN: 20 OINTMENT TOPICAL at 08:09

## 2022-09-21 RX ADMIN — BUPIVACAINE HYDROCHLORIDE 2 ML: 7.5 INJECTION, SOLUTION EPIDURAL; RETROBULBAR at 12:09

## 2022-09-21 RX ADMIN — MIDAZOLAM HYDROCHLORIDE 2 MG: 1 INJECTION, SOLUTION INTRAMUSCULAR; INTRAVENOUS at 12:09

## 2022-09-21 RX ADMIN — SODIUM CHLORIDE: 9 INJECTION, SOLUTION INTRAVENOUS at 08:09

## 2022-09-21 RX ADMIN — PROPOFOL 50 MCG/KG/MIN: 10 INJECTION, EMULSION INTRAVENOUS at 12:09

## 2022-09-21 RX ADMIN — HYDROMORPHONE HYDROCHLORIDE 0.5 MG: 2 INJECTION, SOLUTION INTRAMUSCULAR; INTRAVENOUS; SUBCUTANEOUS at 07:09

## 2022-09-21 RX ADMIN — SENNOSIDES AND DOCUSATE SODIUM 2 TABLET: 8.6; 5 TABLET ORAL at 08:09

## 2022-09-21 RX ADMIN — PHENYLEPHRINE HYDROCHLORIDE 150 MCG: 10 INJECTION INTRAVENOUS at 01:09

## 2022-09-21 RX ADMIN — GABAPENTIN 300 MG: 300 CAPSULE ORAL at 08:09

## 2022-09-21 RX ADMIN — FAMOTIDINE 20 MG: 20 TABLET, FILM COATED ORAL at 08:09

## 2022-09-21 NOTE — TRANSFER OF CARE
"Anesthesia Transfer of Care Note    Patient: Yoan Xavier    Procedure(s) Performed: Procedure(s) (LRB):  HEMIARTHROPLASTY, HIP, POSTERIOR APPROACH (Left)    Patient location: PACU    Anesthesia Type: spinal    Transport from OR: Transported from OR on 2-3 L/min O2 by NC with adequate spontaneous ventilation    Post pain: adequate analgesia    Post assessment: no apparent anesthetic complications    Post vital signs: stable    Level of consciousness: awake, alert and oriented    Nausea/Vomiting: no nausea/vomiting    Complications: none    Transfer of care protocol was followed      Last vitals:   Visit Vitals  BP (!) 150/75   Pulse 67   Temp 36.7 °C (98 °F) (Oral)   Resp 18   Ht 5' 8" (1.727 m)   Wt 68 kg (150 lb)   LMP  (Exact Date)   SpO2 95%   Breastfeeding No   BMI 22.81 kg/m²     "

## 2022-09-21 NOTE — ANESTHESIA PREPROCEDURE EVALUATION
"                                                                                                             09/21/2022  Yoan Xavier is a 81 y.o., female with ----------------------------  Hypertension  Mixed hyperlipidemia    And ----------------------------  Cyst on tailbone  Right hip replacement admitted with hip fracture from about two weeks ago   "   Chief Complaint: Hip Pain (Pt states that she was told to got to the ED by Dr Bruce with left Hip Fractrue as pt fell when she became entangled in walker while doing laundry appprox 12 days ago.)            HISTORY OF PRESENT ILLNESS:   Yoan Xavier is a 81 y.o. female who  has a past medical history of Hypertension and Mixed hyperlipidemia.. The patient presented to [facility] on 9/20/2022 with a primary complaint of hip pain.  Pt tripped in home 12 days ago, she felt left hip pain but was able to walk around and do her daily activities using her walker.  She seen her pcp in [place] about a week ago and xray showed hip fracture but she decided to see if it would heal on its own.  Pain persisted and increased and she presented to ED today.  Plan for surgery tomorrow."     No blood thinners , no SOB, no chest pain, no calf soreness  She has been able to move around with walker this entire time so has not been immobile      .      Pre-op Assessment    I have reviewed the NPO Status.      Review of Systems      Physical Exam  General: Well nourished, Cooperative, Alert and Oriented    Airway:  Mallampati: II   Mouth Opening: Normal  TM Distance: Normal  Tongue: Normal  Neck ROM: Normal ROM    Dental:  Intact    Chest/Lungs:  Clear to auscultation, Normal Respiratory Rate    Heart:  Rate: Normal  Rhythm: Regular Rhythm        Anesthesia Plan  Type of Anesthesia, risks & benefits discussed:    Anesthesia Type: Spinal  Intra-op Monitoring Plan: Standard ASA Monitors  Post Op Pain Control Plan: multimodal analgesia  Informed Consent: Informed consent signed with " the Patient and all parties understand the risks and agree with anesthesia plan.  All questions answered. Patient consented to blood products? Yes  ASA Score: 3  Day of Surgery Review of History & Physical: H&P Update referred to the surgeon/provider.  Anesthesia Plan Notes: Will plan to sedate with propofol and turn lateral in OR for spinal placement then move to OR table  Goal directed IV Fluid therapy      Ready For Surgery From Anesthesia Perspective.     .

## 2022-09-21 NOTE — PLAN OF CARE
Problem: Adult Inpatient Plan of Care  Goal: Plan of Care Review  Outcome: Ongoing, Progressing  Goal: Patient-Specific Goal (Individualized)  Outcome: Ongoing, Progressing  Goal: Absence of Hospital-Acquired Illness or Injury  Outcome: Ongoing, Progressing  Goal: Optimal Comfort and Wellbeing  Outcome: Ongoing, Progressing  Goal: Readiness for Transition of Care  Outcome: Ongoing, Progressing     Problem: Fall Injury Risk  Goal: Absence of Fall and Fall-Related Injury  Outcome: Ongoing, Progressing     Problem: Pain Acute  Goal: Acceptable Pain Control and Functional Ability  Outcome: Ongoing, Progressing     Problem: Infection  Goal: Absence of Infection Signs and Symptoms  Outcome: Ongoing, Progressing

## 2022-09-21 NOTE — PROGRESS NOTES
[facility] ORTHOPAEDIC  [facility] Medicine Progress Note      Patient Name: Yoan Xavier  MRN: [id number]  Admission Date: 9/20/2022   Length of Stay: 1  Attending Physician: Justus Bob MD  Primary Care Provider: Ronal Zuluaga MD  Face-to-Face encounter date: 09/21/2022    Code Status: Full Code        Chief Complaint:   Hip Pain (Pt states that she was told to got to the ED by Dr Bruce with left Hip Fractrue as pt fell when she became entangled in walker while doing laundry appprox 12 days ago.)        HPI:   Yoan Xavier is a 81 y.o. female who  has a past medical history of Hypertension and Mixed hyperlipidemia.. The patient presented to [facility] on 9/20/2022 with a primary complaint of hip pain.  Pt tripped in home 12 days ago, she felt left hip pain but was able to walk around and do her daily activities using her walker.  She seen her pcp in [place] about a week ago and xray showed hip fracture but she decided to see if it would heal on its own.  Pain persisted and increased and she presented to ED today.  Plan for surgery tomorrow.    Overview/Hospital Course:  No notes on file       Interval Hx:   Resting in bed, no f/c/overnight issues    Review of Systems   All other systems reviewed and are negative.    Objective/physical exam:  General: In no acute distress, afebrile  Chest: Clear to auscultation bilaterally  Heart: RRR, +S1, S2, no appreciable murmur  Abdomen: Soft, nontender, BS +  MSK: left hip tenderness  Neurologic: Alert and oriented x4, Cranial nerve II-XII intact, Strength 5/5 in all 4 extremities    VITAL SIGNS: 24 HRS MIN & MAX LAST   Temp  Min: 97.6 °F (36.4 °C)  Max: 98.6 °F (37 °C) 98 °F (36.7 °C)   BP  Min: 111/54  Max: 155/72 (!) 150/75     Pulse  Min: 62  Max: 73  67   Resp  Min: 18  Max: 24 18   SpO2  Min: 92 %  Max: 100 % 95 %       Recent Labs   Lab 09/20/22  1546   WBC 12.0*   RBC 4.41   HGB 13.0   HCT 38.9   MCV 88.2   MCH 29.5    MCHC 33.4   RDW 13.4      MPV 9.5       Recent Labs   Lab 09/20/22  1547      K 4.4   CO2 27   BUN 21.9*   CREATININE 0.83   CALCIUM 10.0   ALBUMIN 3.6   ALKPHOS 65   ALT 9   AST 15   BILITOT 1.1        Microbiology Results (last 7 days)       ** No results found for the last 168 hours. **             Radiology:  X-Ray Hip 2 or 3 views Left (with Pelvis when performed)  EXAMINATION  XR HIP WITH PELVIS WHEN PERFORMED, 2 OR 3 VIEWS LEFT    CLINICAL HISTORY  fall; ongoing pain after fall 1 week ago    TECHNIQUE  A total of 3 images submitted of the hip/pelvis.    COMPARISON  None available at the time of initial interpretation.    FINDINGS  Acutely displaced, comminuted and impacted fracture of the subcapital left femoral neck is noted.  No other acute cortical disruption is identified.  There is no joint incongruity.  Right hip arthroplasty components are incidentally visualized, no suspicious radiographic findings.  There are degenerative alterations throughout the regional osseous structures.    The included soft tissues are without acute abnormality.    IMPRESSION  Acute left femoral neck fracture.    Electronically signed by: Deondre Baltazar  Date:    09/20/2022  Time:    16:23      Scheduled Med:   famotidine  20 mg Oral BID        Continuous Infusions:       PRN Meds:  acetaminophen, hydrALAZINE, HYDROcodone-acetaminophen, HYDROmorphone, labetalol, melatonin, sodium chloride 0.9%     Nutrition Status:      Assessment/Plan:  Left femoral neck fracture  Htn  Hld  Tobacco abuse     Plan    ekg   Refer to ortho rec-surgery today  Pain control  Obtain med list  Prn hydralazine/labetalol     Gi proph: pepcid  Dvt proph: scd             All diagnosis and differential diagnosis have been reviewed; assessment and plan has been documented; I have personally reviewed the labs and test results that are presently available; I have reviewed the patients medication list; I have reviewed the consulting providers  response and recommendations. I have reviewed or attempted to review medical records based upon their availability      _____________________________________________________________________            Justus Bob MD   09/21/2022

## 2022-09-21 NOTE — ANESTHESIA PROCEDURE NOTES
Spinal    Diagnosis: Left Femur Fx  Patient location during procedure: OR  Start time: 9/21/2022 12:40 PM  Timeout: 9/21/2022 12:40 PM  End time: 9/21/2022 12:45 PM    Staffing  Authorizing Provider: Echo Lakhani MD  Performing Provider: Echo Lakhani MD    Preanesthetic Checklist  Completed: patient identified, IV checked, site marked, risks and benefits discussed, surgical consent, monitors and equipment checked, pre-op evaluation and timeout performed  Spinal Block  Patient position: right lateral decubitus  Prep: ChloraPrep  Patient monitoring: heart rate, cardiac monitor, continuous pulse ox, continuous capnometry and frequent blood pressure checks  Approach: midline  Location: L3-4  Injection technique: single shot  CSF Fluid: clear free-flowing CSF  Needle  Needle type: pencil-tip   Needle gauge: 25 G  Needle length: 3.5 in  Additional Documentation: incremental injection, negative aspiration for heme and no paresthesia on injection  Needle localization: anatomical landmarks  Additional Notes  Pt sedated with propofol and turned on her side - EZ straightforward spinal block   Medications:    Medications: bupivacaine (pf) (MARCAINE) injection 0.75% - Intraspinal   2 mL - 9/21/2022 12:45:00 PM

## 2022-09-21 NOTE — OP NOTE
OPERATIVE REPORT      Patient: Yoan Xavier   : 1940    MRN: [id number]  Date: 2022      Surgeon: Christos Christensen MD  Assistant: Alva Winslow NP, [facility].  Certified first assist was necessary as a skilled set of hands and was necessary for proper patient positioning as well as assistance with closure in place with multiple implants.  Preoperative Diagnosis:  Left femoral neck fracture  Postoperative Diagnosis: Same  Procedure:  left hemiarthroplasty  Anesthesiologist: Echo Lakhani MD  OR Staff: Circulator: Elizabeth Aguiar RN  Nurse Practitioner: WILFRID Christine  Scrub Person: Ahmet Newberry  Implants:   Implant Name Type Inv. Item Serial No.  Lot No. LRB No. Used Action   Universal Head Bipolar Component     DUNIA [id number] Left 1 Implanted   HEAD V40 BIOLOX TAPR 28MM -4 - [id number]  HEAD V40 BIOLOX TAPR 28MM -4  DUN[employer] CHANDA. [id number] Left 1 Implanted   STEM INSINGIA HIP HIGH SZ 6 - [id number]  STEM INSINGIA HIP HIGH SZ 6  DUNIA [employer] CHANDA. [id number] Left 1 Implanted     EBL: 81  Complications: None  Disposition: To PACU, stable    Indications: Yoan Xavier is a 81 y.o. female presenting with displaced femoral neck fracture.  The procedure is indicated to stabilize femoral neck.  The patient is awake and alert. After thorough discussion of the risks, benefits, expected outcomes, and alternatives to surgical intervention, the patient agreed to proceed with surgical treatment.  Specific risks discussed included, but were not limited to: superficial or deep infection, wound healing complications, DVT/PE, significant bleeding requiring transfusion, damage to named anatomic structures in the immediate area including named neurovascular structures, infection, nonunion, malunion and general risks of anesthesia.  The patient voiced understanding and written as well as verbal consent was obtained by myself prior to the procedure.    Procedure Note:  The patient was brought back  to the OR and placed supine on the OR table. After successful induction of anesthesia by anesthesia staff, the patient was positioned in the lateral decubitus position and all bony prominences were padded appropriately.  The surgical field was then provisionally cleansed and then prepped and draped in the usual sterile fashion.    At this time a time-out was performed, with the correct patient, site, and procedure identified.  The universal time out as well as sign your site protocols were followed.  Preoperative antibiotics were verified as administered.     Standard direct superior approach to the hip performed.  Taken down through the skin and subcutaneous tissues.  Fascia incised in line with the fibers and Charnley retractor placed.  Hip was internally rotated exposing the piriformis tendon.  Arthrotomy was made on the inferior aspect of the piriformis tendon and taken distally in a hockey-stick style fashion.  Hip was internally rotated showing the femoral neck fracture.  Osteotomy was made 1 fingerbreadth proximal level of lesser trochanter.  Bony fragments resected.  The femoral head was resected and removed from the acetabulum with a corkscrew.  It was sized.  We then sized the acetabulum to a 46 mm head.  Turned our attention back to the femur.  Used a cookie cutter to take out the lateral femoral neck.  Opened the canal with a canal finder.  Broached sequentially up to size 6.  At this point we trialed.  Hip was taken through full range of motion.  No instability to anterior posterior dislocation.  Good recreation of leg lengths felt.      We then dislocated the hip.  Confirm stability of the trial implants and broach.  We then opened up a size 6 stem.  We impacted this into position and came down previous level of resection.  Femoral head as well as bipolar head were opened and impacted in position and found to be down.  Hip was reduced.  Again taken to full range of motion.  No instability.  Excellent  recreation of leg lengths.  We then irrigated with Betadine lavage as well as copious normal saline.  Closed the short external rotators as well as the hip capsule using #2 FiberWire.  Fascia closed with #1 Stratafix.  Subcutaneous closed with 2-0 Monocryl, staples on the skin.      The patient was then subsequently transferred to to PACU in a stable condition.    All sponge and needle counts were correct at the end of the case.  I was present and participated in all aspects of the procedure.    Prognosis:  The patient will be kept WBAT on the ipsilateral extremity with hip precautions.  Patient will receive DVT prophylaxis .  Plan to discharge home versus rehab pending physical therapy evaluation.      This note/OR report was created with the assistance of  voice recognition software or phone  dictation.  There may be transcription errors as a result of using this technology however minimal. Effort has been made to assure accuracy of transcription but any obvious errors or omissions should be clarified with the author of the document.

## 2022-09-21 NOTE — PLAN OF CARE
Problem: Physical Therapy  Goal: Physical Therapy Goal  Description: Pt will improve functional independence by performing:    Bed mobility: SBA  Sit to stand: SBA   Ambulation x 150' feet with Min A  and rolling walker  1 Step (Curb): Min A  and rolling walker  3 Steps: Min A  and B HR  Independent with total knee HEP      Outcome: Ongoing, Progressing

## 2022-09-21 NOTE — CONSULTS
Past Medical History:   Diagnosis Date    COPD (chronic obstructive pulmonary disease)     Depression     Hypertension     Insomnia     Lumbar degenerative disc disease     Mixed hyperlipidemia        Past Surgical History:   Procedure Laterality Date    cyst on tailbone      right hip replacement         Current Facility-Administered Medications   Medication    acetaminophen tablet 500 mg    amLODIPine tablet 5 mg    atorvastatin tablet 40 mg    famotidine tablet 20 mg    hydrALAZINE injection 10 mg    HYDROcodone-acetaminophen 5-325 mg per tablet 1 tablet    HYDROmorphone (PF) injection 1 mg    labetalol 20 mg/4 mL (5 mg/mL) IV syring    melatonin tablet 6 mg    mupirocin 2 % ointment    sodium chloride 0.9% flush 10 mL       Review of patient's allergies indicates:  No Known Allergies    Family History   Problem Relation Age of Onset    Hypertension Mother     Hypertension Father        Social History     Socioeconomic History    Marital status: Single   Tobacco Use    Smoking status: Every Day     Packs/day: 1.00     Types: Cigarettes    Smokeless tobacco: Never   Substance and Sexual Activity    Alcohol use: Yes     Comment: 3 glass wine a year    Drug use: Never       Chief Complaint:   Chief Complaint   Patient presents with    Hip Pain     Pt states that she was told to got to the ED by Dr Bruce with left Hip Fractrue as pt fell when she became entangled in walker while doing laundry [employer] 12 days ago.       History of present illness: 82 yo F presents for L hip pain.  Patient sustained a fall about 7-10 days ago.  Landing on he rleft hip.  Was able to ambulate initially, but has not been able to ambulate for the last 5-7 days due to severe L hip pain.  X-ray taken and noted to have displaced L hip fracture.        Review of Systems:    Constitution: Negative for chills, fever, and sweats.  Negative for unexplained weight loss.    HENT:  Negative for headaches and blurry  vision.    Cardiovascular:Negative for chest pain or irregular heart beat. Negative for hypertension.    Respiratory:  Negative for cough and shortness of breath.    Gastrointestinal: Negative for abdominal pain, heartburn, melena, nausea, and vomitting.    Genitourinary:  Negative bladder incontinence and dysuria.    Musculoskeletal:  See HPI    Neurological: Negative for numbness.    Psychiatric/Behavioral: Negative for depression.  The patient is not nervous/anxious.      Endocrine: Negative for polyuria    Hematologic/Lymphatic: Negative for bleeding problem.  Does not bruise/bleed easily.    Skin: Negative for poor would healing and rash      Physical Examination:    Vital Signs:    Vitals:    09/21/22 0744   BP: (!) 150/75   Pulse: 67   Resp:    Temp: 98 °F (36.7 °C)       Body mass index is 22.81 kg/m².    General: No acute distress, alert and oriented, healthy appearing    HEENT: Head is atraumatic, mucous membranes are moist    Neck: Supples, no JVD    Cardiovascular: Palpable dorsalis pedis and posterior tibial pulses, regular rate and rhythm to those pulses    Lungs: Breathing non-labored    Skin: no rashes appreciated    Neurologic: Can flex and extend knees, ankles, and toes. Sensation is grossly intact    L hip with severe pain with ROM.    BCR distally  Full ROM not checked to ip due to pain/fracture.    X-rays: x-rays left hip reviewed - patient with displaced L femoral neck fracture.      Assessment::L femoral neck fracture.    Plan:  risks/benefits/alternatives discussed in detail with patient today.  Recommend hemiarthroplasty due to patient age, activity level, chronicity of fracture.  Risks discussed in detail including, but not limited to infection, bleeding, scarring, need for revision surgery, pain, dislocation, DVT, PE, death.  Despite these risks, she would like to proceed with surgical intervention. All questions answered to her satisfaction, no guarantees made.  Plan for L hip  hemiarthroplasty today.    This note was created using [employer] voice recognition software that occasionally misinterpreted phrases or words.    Consult note is delivered via Epic messaging service.

## 2022-09-21 NOTE — PLAN OF CARE
Problem: Adult Inpatient Plan of Care  Goal: Plan of Care Review  9/20/2022 2120 by Emelia Orellana LPN  Outcome: Ongoing, Progressing  9/20/2022 2119 by Emelia Orellana LPN  Outcome: Ongoing, Progressing  Goal: Patient-Specific Goal (Individualized)  9/20/2022 2120 by Emelia Orellana LPN  Outcome: Ongoing, Progressing  9/20/2022 2119 by Emelia Orellana LPN  Outcome: Ongoing, Progressing  Goal: Absence of Hospital-Acquired Illness or Injury  9/20/2022 2120 by Emelia Orellana LPN  Outcome: Ongoing, Progressing  9/20/2022 2119 by Emelia Orellana LPN  Outcome: Ongoing, Progressing  Goal: Optimal Comfort and Wellbeing  9/20/2022 2120 by Emelia Orellana LPN  Outcome: Ongoing, Progressing  9/20/2022 2119 by Emelia Orellana LPN  Outcome: Ongoing, Progressing     Problem: Fall Injury Risk  Goal: Absence of Fall and Fall-Related Injury  Outcome: Ongoing, Progressing     Problem: Pain Acute  Goal: Acceptable Pain Control and Functional Ability  Outcome: Ongoing, Progressing

## 2022-09-21 NOTE — PT/OT/SLP EVAL
"Physical Therapy Evaluation    Patient Name:  Yoan Xavier   MRN:  [id number]    Recommendations:     Discharge Recommendations:  nursing facility, skilled, rehabilitation facility   Discharge Equipment Recommendations: walker, rolling   Barriers to discharge: Decreased caregiver support    Assessment:     Yoan Xavier is a 81 y.o. female admitted with a medical diagnosis of Closed fracture of left hip.      She presents with the following impairments/functional limitations:  impaired functional mobility, orthopedic precautions, pain, decreased safety awareness, impaired muscle length, impaired balance, impaired endurance, weakness, gait instability, decreased ROM.    Rehab Prognosis: Good; patient would benefit from acute skilled PT services to address these deficits and reach maximum level of function.    Recent Surgery: Procedure(s) (LRB):  HEMIARTHROPLASTY, HIP, POSTERIOR APPROACH (Left) Day of Surgery    Pt is Oriented x3 and Alert, Cooperative, and Motivated      Plan:     During this hospitalization, patient to be seen BID to address the identified rehab impairments via gait training, therapeutic activities, therapeutic exercises and progress toward the following goals:    Plan of Care Expires:  09/27/22    Subjective     Chief Complaint:   Patient/Family Comments/goals: "to go back home"  Pain/Comfort:  Pain Rating 1: 7/10  Pain Addressed 1: Nurse notified, Cessation of Activity  Pain Rating Post-Intervention 1: 7/10    Patients cultural, spiritual, Sabianism conflicts given the current situation: no    Living Environment:  Pt lives alone, has threshold to enter house and no steps inside house.   Prior to admission, patients level of function was independent. Prior responsibilities include: driving, not working, and household tasks. Equipment used at home: walker, rolling.  DME owned (not currently used): none.  Upon discharge, patient will have assistance from no one.      Pt stated they performed " HEP/attend prehab prior to sx: n/a    Objective:     Communicated with RN prior to session.  Patient found supine with blood pressure cuff, pulse ox (continuous), telemetry, peripheral IV, george catheter  upon PT entry to room.    General Precautions: Standard, fall   Orthopedic Precautions:LLE weight bearing as tolerated, LLE posterior precautions   Braces: N/A  Respiratory Status: Nasal cannula, flow 2 L/min    Exams:  Sensation:    -       Intact  RLE ROM: WFL  RLE Strength: WFL  LLE ROM:  limited 2/2 post op precautions  LLE Strength:  NT due to sx site      Functional Mobility:  Bed Mobility:     Scooting: minimum assistance  Supine to Sit: minimum assistance  Transfers:     Sit to Stand:  moderate assistance with rolling walker  Gait: 8' with RW and min-mod A    Other tasks performed:  N/A      Patient left up in chair with all lines intact, call button in reach, and RN notified.    GOALS:   Multidisciplinary Problems       Physical Therapy Goals          Problem: Physical Therapy    Goal Priority Disciplines Outcome Goal Variances Interventions   Physical Therapy Goal     PT, PT/OT Ongoing, Progressing     Description: Pt will improve functional independence by performing:    Bed mobility: SBA  Sit to stand: SBA   Ambulation x 150' feet with Min A  and rolling walker  1 Step (Curb): Min A  and rolling walker  3 Steps: Min A  and B HR  Independent with total knee HEP                           History:     Past Medical History:   Diagnosis Date    COPD (chronic obstructive pulmonary disease)     Depression     Hypertension     Insomnia     Lumbar degenerative disc disease     Mixed hyperlipidemia        Past Surgical History:   Procedure Laterality Date    cyst on tailbone      right hip replacement         Time Tracking:     PT Received On:    PT Start Time: 1704     PT Stop Time: 1721  PT Total Time (min): 17 min     Billable Minutes: Evaluation 17 09/21/2022

## 2022-09-22 PROBLEM — S72.032A CLOSED DISPLACED MIDCERVICAL FRACTURE OF LEFT FEMUR: Status: ACTIVE | Noted: 2022-09-22

## 2022-09-22 LAB
ANION GAP SERPL CALC-SCNC: 6 MEQ/L
BASOPHILS # BLD AUTO: 0.01 X10(3)/MCL (ref 0–0.2)
BASOPHILS NFR BLD AUTO: 0.1 %
BUN SERPL-MCNC: 19.3 MG/DL (ref 9.8–20.1)
CALCIUM SERPL-MCNC: 9 MG/DL (ref 8.4–10.2)
CHLORIDE SERPL-SCNC: 101 MMOL/L (ref 98–107)
CO2 SERPL-SCNC: 29 MMOL/L (ref 23–31)
CREAT SERPL-MCNC: 0.73 MG/DL (ref 0.55–1.02)
CREAT/UREA NIT SERPL: 26
EOSINOPHIL # BLD AUTO: 0 X10(3)/MCL (ref 0–0.9)
EOSINOPHIL NFR BLD AUTO: 0 %
ERYTHROCYTE [DISTWIDTH] IN BLOOD BY AUTOMATED COUNT: 13.5 % (ref 11.5–17)
GFR SERPLBLD CREATININE-BSD FMLA CKD-EPI: >60 MLS/MIN/1.73/M2
GLUCOSE SERPL-MCNC: 139 MG/DL (ref 82–115)
HCT VFR BLD AUTO: 34.7 % (ref 37–47)
HGB BLD-MCNC: 11.4 GM/DL (ref 12–16)
IMM GRANULOCYTES # BLD AUTO: 0.03 X10(3)/MCL (ref 0–0.04)
IMM GRANULOCYTES NFR BLD AUTO: 0.3 %
LYMPHOCYTES # BLD AUTO: 0.54 X10(3)/MCL (ref 0.6–4.6)
LYMPHOCYTES NFR BLD AUTO: 4.5 %
MCH RBC QN AUTO: 29.6 PG (ref 27–31)
MCHC RBC AUTO-ENTMCNC: 32.9 MG/DL (ref 33–36)
MCV RBC AUTO: 90.1 FL (ref 80–94)
MONOCYTES # BLD AUTO: 1.38 X10(3)/MCL (ref 0.1–1.3)
MONOCYTES NFR BLD AUTO: 11.5 %
NEUTROPHILS # BLD AUTO: 10 X10(3)/MCL (ref 2.1–9.2)
NEUTROPHILS NFR BLD AUTO: 83.6 %
NRBC BLD AUTO-RTO: 0 %
PLATELET # BLD AUTO: 297 X10(3)/MCL (ref 130–400)
PMV BLD AUTO: 9 FL (ref 7.4–10.4)
POTASSIUM SERPL-SCNC: 4.6 MMOL/L (ref 3.5–5.1)
RBC # BLD AUTO: 3.85 X10(6)/MCL (ref 4.2–5.4)
SODIUM SERPL-SCNC: 136 MMOL/L (ref 136–145)
WBC # SPEC AUTO: 12 X10(3)/MCL (ref 4.5–11.5)

## 2022-09-22 PROCEDURE — 85025 COMPLETE CBC W/AUTO DIFF WBC: CPT | Performed by: ORTHOPAEDIC SURGERY

## 2022-09-22 PROCEDURE — 97110 THERAPEUTIC EXERCISES: CPT | Mod: CQ

## 2022-09-22 PROCEDURE — 25000003 PHARM REV CODE 250: Performed by: ORTHOPAEDIC SURGERY

## 2022-09-22 PROCEDURE — 63600175 PHARM REV CODE 636 W HCPCS: Performed by: ORTHOPAEDIC SURGERY

## 2022-09-22 PROCEDURE — 99223 1ST HOSP IP/OBS HIGH 75: CPT | Mod: ,,, | Performed by: ORTHOPAEDIC SURGERY

## 2022-09-22 PROCEDURE — 11000001 HC ACUTE MED/SURG PRIVATE ROOM

## 2022-09-22 PROCEDURE — 25000003 PHARM REV CODE 250: Performed by: INTERNAL MEDICINE

## 2022-09-22 PROCEDURE — 27000221 HC OXYGEN, UP TO 24 HOURS

## 2022-09-22 PROCEDURE — 94761 N-INVAS EAR/PLS OXIMETRY MLT: CPT

## 2022-09-22 PROCEDURE — 97166 OT EVAL MOD COMPLEX 45 MIN: CPT

## 2022-09-22 PROCEDURE — 94799 UNLISTED PULMONARY SVC/PX: CPT

## 2022-09-22 PROCEDURE — 36415 COLL VENOUS BLD VENIPUNCTURE: CPT | Performed by: ORTHOPAEDIC SURGERY

## 2022-09-22 PROCEDURE — 80048 BASIC METABOLIC PNL TOTAL CA: CPT | Performed by: ORTHOPAEDIC SURGERY

## 2022-09-22 PROCEDURE — 99223 PR INITIAL HOSPITAL CARE,LEVL III: ICD-10-PCS | Mod: ,,, | Performed by: ORTHOPAEDIC SURGERY

## 2022-09-22 PROCEDURE — 97530 THERAPEUTIC ACTIVITIES: CPT | Mod: CQ

## 2022-09-22 PROCEDURE — 97116 GAIT TRAINING THERAPY: CPT | Mod: CQ

## 2022-09-22 RX ORDER — HYDROXYZINE HYDROCHLORIDE 25 MG/1
25 TABLET, FILM COATED ORAL 4 TIMES DAILY PRN
Status: DISCONTINUED | OUTPATIENT
Start: 2022-09-22 | End: 2022-09-23 | Stop reason: HOSPADM

## 2022-09-22 RX ADMIN — CEFAZOLIN SODIUM 2000 MG: 2 INJECTION, SOLUTION INTRAVENOUS at 08:09

## 2022-09-22 RX ADMIN — SENNOSIDES AND DOCUSATE SODIUM 2 TABLET: 8.6; 5 TABLET ORAL at 08:09

## 2022-09-22 RX ADMIN — KETOROLAC TROMETHAMINE 15 MG: 30 INJECTION, SOLUTION INTRAMUSCULAR; INTRAVENOUS at 01:09

## 2022-09-22 RX ADMIN — GABAPENTIN 300 MG: 300 CAPSULE ORAL at 08:09

## 2022-09-22 RX ADMIN — HYDROCODONE BITARTRATE AND ACETAMINOPHEN 1 TABLET: 5; 325 TABLET ORAL at 02:09

## 2022-09-22 RX ADMIN — HYDROCODONE BITARTRATE AND ACETAMINOPHEN 1 TABLET: 5; 325 TABLET ORAL at 08:09

## 2022-09-22 RX ADMIN — ACETAMINOPHEN 500 MG: 500 TABLET ORAL at 02:09

## 2022-09-22 RX ADMIN — KETOROLAC TROMETHAMINE 15 MG: 30 INJECTION, SOLUTION INTRAMUSCULAR; INTRAVENOUS at 05:09

## 2022-09-22 RX ADMIN — POLYETHYLENE GLYCOL 3350 17 G: 17 POWDER, FOR SOLUTION ORAL at 09:09

## 2022-09-22 RX ADMIN — DOCUSATE SODIUM 200 MG: 100 CAPSULE, LIQUID FILLED ORAL at 06:09

## 2022-09-22 RX ADMIN — MUPIROCIN: 20 OINTMENT TOPICAL at 09:09

## 2022-09-22 RX ADMIN — ASPIRIN 81 MG CHEWABLE TABLET 81 MG: 81 TABLET CHEWABLE at 09:09

## 2022-09-22 RX ADMIN — ACETAMINOPHEN 500 MG: 500 TABLET ORAL at 10:09

## 2022-09-22 RX ADMIN — HYDROXYZINE HYDROCHLORIDE 25 MG: 25 TABLET ORAL at 06:09

## 2022-09-22 RX ADMIN — FAMOTIDINE 20 MG: 20 TABLET, FILM COATED ORAL at 08:09

## 2022-09-22 RX ADMIN — ASPIRIN 81 MG CHEWABLE TABLET 81 MG: 81 TABLET CHEWABLE at 08:09

## 2022-09-22 RX ADMIN — METOCLOPRAMIDE 10 MG: 5 INJECTION, SOLUTION INTRAMUSCULAR; INTRAVENOUS at 04:09

## 2022-09-22 RX ADMIN — CEFAZOLIN SODIUM 2000 MG: 2 INJECTION, SOLUTION INTRAVENOUS at 02:09

## 2022-09-22 RX ADMIN — KETOROLAC TROMETHAMINE 15 MG: 30 INJECTION, SOLUTION INTRAMUSCULAR; INTRAVENOUS at 06:09

## 2022-09-22 RX ADMIN — ATORVASTATIN CALCIUM 40 MG: 40 TABLET, FILM COATED ORAL at 08:09

## 2022-09-22 RX ADMIN — AMLODIPINE BESYLATE 5 MG: 5 TABLET ORAL at 08:09

## 2022-09-22 RX ADMIN — METOCLOPRAMIDE 10 MG: 5 INJECTION, SOLUTION INTRAMUSCULAR; INTRAVENOUS at 08:09

## 2022-09-22 RX ADMIN — ACETAMINOPHEN 500 MG: 500 TABLET ORAL at 05:09

## 2022-09-22 RX ADMIN — METOCLOPRAMIDE 10 MG: 5 INJECTION, SOLUTION INTRAMUSCULAR; INTRAVENOUS at 02:09

## 2022-09-22 NOTE — PLAN OF CARE
Admitted w femur fx-- s/p hemiarthroplasty hip. Spk w pt -- std she resides at [facility] living facility. PLOF: amb w RW. Discuss dcp options--pt requested to inpt rehab eval in [facility]. Dcp: return to [facility] living after rehab.  Foc obtained.   Called/ faxed referral for inpt rehab to [facility] rehab. Await decision.     Contact Judy Ott Gigi ( sister/ P.O.A) [phone].   Attempted to contact Ms Yu to discuss poc - no answer; no vm.

## 2022-09-22 NOTE — PROGRESS NOTES
"No acute events overnight.  Pain controlled.  Resting in bed.     Vital Signs  Temp: 97.5 °F (36.4 °C)  Temp src: Oral  Pulse: 68  Resp: 16  SpO2: 96 %  Pulse Oximetry Type: Intermittent  Flow (L/min): 2  Oxygen Concentration (%): 80  O2 Device (Oxygen Therapy): nasal cannula  BP: (!) 149/61  BP Location: Right arm  BP Method: Automatic  Patient Position: Sitting  Height and Weight  Height: 5' 8" (172.7 cm)  Height Method: Stated  Weight: 68 kg (150 lb)  Weight Method: Stated  BSA (Calculated - sq m): 1.81 sq meters  BMI (Calculated): 22.8  Weight in (lb) to have BMI = 25: 164.1  Patient Observation  Observations: Pt relates fall at [facility] in [place] with xray obtained and review of films by Dr Zuluaga with referral to Dr Bruce]    +FHL/EHL  BCR distally  Dressing c/d/i  SILT distally    Recent Lab Results         09/22/22  0533   09/21/22  1414        Anion Gap 6.0         Baso # 0.01         Basophil % 0.1         BUN 19.3         BUN/CREAT RATIO 26         Calcium 9.0         Chloride 101         CO2 29         Creatinine 0.73         eGFR >60         Eos # 0.00         Eosinophil % 0.0         Glucose 139         Hematocrit 34.7   35.1       Hemoglobin 11.4   11.4       Immature Grans (Abs) 0.03         Immature Granulocytes 0.3         Lymph # 0.54         LYMPH % 4.5         MCH 29.6         MCHC 32.9         MCV 90.1         Mono # 1.38         Mono % 11.5         MPV 9.0         Neut # 10.0         Neut % 83.6         nRBC 0.0         Platelets 297         Potassium 4.6         RBC 3.85         RDW 13.5         Sodium 136         WBC 12.0                 A/P:  Status post L hip hemiarthroplasty  Pain controlled  Overall patient doing well.  Therapy for mobility and ambulation.  DVT PPx    "

## 2022-09-22 NOTE — PT/OT/SLP EVAL
Occupational Therapy   Evaluation    Name: Yoan Xavier  MRN: [id number]  Admitting Diagnosis:  Closed fracture of left hip  Recent Surgery: Procedure(s) (LRB):  HEMIARTHROPLASTY, HIP, POSTERIOR APPROACH (Left) 1 Day Post-Op    Recommendations:     Discharge Recommendations: nursing facility, skilled  Discharge Equipment Recommendations:  hip kit, walker, rolling  Barriers to discharge:       Assessment:     Yoan Xavier is a 81 y.o. female with a medical diagnosis of Closed fracture of left hip.  She presents with good motivation to participate in today's session. Performance deficits affecting function: weakness, impaired endurance, impaired self care skills, impaired functional mobility, gait instability, impaired balance, decreased lower extremity function, decreased ROM, orthopedic precautions.      Rehab Prognosis: Good; patient would benefit from acute skilled OT services to address these deficits and reach maximum level of function.       Plan:     Patient to be seen  (QD>BID) to address the above listed problems via self-care/home management, therapeutic activities, therapeutic exercises  Plan of Care Expires: 09/28/22  Plan of Care Reviewed with: patient    Subjective     Chief Complaint: none  Patient/Family Comments/goals: return to PLOF    Occupational Profile:  Living Environment: pt reports she lived in assisted living vs [facility] home-unable to clarify which  Previous level of function: ind ADLs  Roles and Routines: self-care  Equipment Used at Home:  walker, rolling  Assistance upon Discharge: limited report d/t cognitive status    Pain/Comfort:  Pain Rating 1: 0/10    Patients cultural, spiritual, Orthodox conflicts given the current situation:      Objective:     Communicated with: nrsg prior to session.  Patient found up in chair with peripheral IV upon OT entry to room.    General Precautions: Standard, fall   Orthopedic Precautions:LLE weight bearing as tolerated, LLE posterior precautions    Braces: N/A  Respiratory Status: Room air    Occupational Performance:      Functional Mobility/Transfers:  Patient completed Sit <> Stand Transfer with minimum assistance  with  rolling walker   Patient completed Toilet Transfer Step Transfer technique with minimum assistance with  grab bars  Functional Mobility: pt mobilized to bathroom Min A using RW.     Activities of Daily Living:  Grooming: supervision standing with RW support while patient washed face and brushed teeth  Lower Body Dressing: maximal assistance will need hip kit  Toileting: minimum assistance Min A for balance    Cognitive/Visual Perceptual:  Cognitive/Psychosocial Skills:     -       Oriented to: person and year-could not verbalize month or location     Physical Exam:  Upper Extremity Range of Motion:     -       Right Upper Extremity: WFL  -       Left Upper Extremity: WFL  Upper Extremity Strength:    -       Right Upper Extremity: WFL  -       Left Upper Extremity: WFL      Treatment & Education:  Edu pt on hip pxns and the implications on self-care. Edu on fall prevention including have nrsg staff assist with all mobility and transfers.     Patient left up in chair with all lines intact and call button in reach    GOALS:   Multidisciplinary Problems       Occupational Therapy Goals       Not on file                    History:     Past Medical History:   Diagnosis Date    COPD (chronic obstructive pulmonary disease)     Depression     Hypertension     Insomnia     Lumbar degenerative disc disease     Mixed hyperlipidemia          Past Surgical History:   Procedure Laterality Date    cyst on tailbone      HEMIARTHROPLASTY, HIP, POSTERIOR APPROACH Left 9/21/2022    Procedure: HEMIARTHROPLASTY, HIP, POSTERIOR APPROACH;  Surgeon: Christos Christensen MD;  Location: [facility];  Service: Orthopedics;  Laterality: Left;    right hip replacement         Time Tracking:     OT Date of Treatment: 09/22/22  OT Start Time: 1125  OT Stop Time: 1153  OT  Total Time (min): 28 min    Billable Minutes:Evaluation 28 9/22/2022

## 2022-09-22 NOTE — PT/OT/SLP PROGRESS
"Physical Therapy Treatment    Patient Name:  Yoan Xavier   MRN:  [id number]    Recommendations:     Discharge Recommendations:  nursing facility, skilled, rehabilitation facility   Discharge Equipment Recommendations: walker, rolling   Barriers to discharge: Decreased caregiver support    Assessment:     Yoan Xavier is a 81 y.o. female admitted with a medical diagnosis of Closed fracture of left hip.  She presents with the following impairments/functional limitations:  weakness, impaired endurance, impaired self care skills, impaired functional mobility, gait instability, impaired balance, decreased lower extremity function, pain, decreased safety awareness, impaired cognition, impaired muscle length, orthopedic precautions, decreased ROM .    Rehab Prognosis: Good; patient would benefit from acute skilled PT services to address these deficits and reach maximum level of function.    Recent Surgery: Procedure(s) (LRB):  HEMIARTHROPLASTY, HIP, POSTERIOR APPROACH (Left) 1 Day Post-Op    Plan:     During this hospitalization, patient to be seen BID to address the identified rehab impairments via gait training, therapeutic activities, therapeutic exercises and progress toward the following goals:    Plan of Care Expires:  09/27/22    Subjective     Chief Complaint: "I want to go back bed "  Patient/Family Comments/goals: pt c/o low back pain   Pain/Comfort:  Pain Rating 1: 6/10  Location - Side 1: Bilateral  Location 1: back  Pain Addressed 1: Nurse notified, Other (see comments) (nursing in with pain meds)      Objective:     Communicated with nursing  prior to session.  Patient found up in chair with peripheral IV, Other (comments) (pt in recliner from am tx) upon PT entry to room.     General Precautions: Standard, fall   Orthopedic Precautions:LLE weight bearing as tolerated, LLE posterior precautions   Braces: N/A  Respiratory Status: Room air     Functional Mobility:  Bed Mobility:     Sit to Supine: " minimum assistance and touching a with lle fully into bed with vc for left hip pxs use of bed rails   Transfers:     Sit to Stand:  contact guard assistance with rolling walker  Bed to Chair: contact guard assistance and vc for safety with  rolling walker  using  Step Transfer  Toilet Transfer: contact guard assistance and vc for safety  with  rolling walker  using  Step Transfer and post void/bm  with vc for left hip pxs with toileting task   Gait: pt amb cga assist of one person use of  rw 10ft to restroom then 20ft to sit EOB with vc for turning for left hip pxs lle wbat vc for correction of wide THERESA for B feet to stay within rw       Therapeutic Activities and Exercises:   Pt performed seated in recliner HEP per SANDRA one set 10-15reps per handout     Patient left HOB elevated with call button in reach, nursing  present, and pt christa tx slowly improving with activity christa pt returned to bed after tx per pt request with abd pillow placed  ..    GOALS:   Multidisciplinary Problems       Physical Therapy Goals          Problem: Physical Therapy    Goal Priority Disciplines Outcome Goal Variances Interventions   Physical Therapy Goal     PT, PT/OT Ongoing, Progressing     Description: Pt will improve functional independence by performing:    Bed mobility: SBA  Sit to stand: SBA   Ambulation x 150' feet with Min A  and rolling walker  1 Step (Curb): Min A  and rolling walker  3 Steps: Min A  and B HR  Independent with total knee HEP                           Time Tracking:     PT Received On: 09/22/22  PT Start Time: 1355     PT Stop Time: 1440  PT Total Time (min): 45 min     Billable Minutes: Gait Training 10min, Therapeutic Activity 20min, and Therapeutic Exercise 15min    Treatment Type: Treatment  PT/PTA: PTA     PTA Visit Number: 1     09/22/2022

## 2022-09-22 NOTE — PROGRESS NOTES
[facility]  Hospital Medicine Progress Note      Patient Name: Yoan Xavier  MRN: [id number]  Admission Date: 9/20/2022   Length of Stay: 2  Attending Physician: Abdullahi Carpio MD  Primary Care Provider: Ronal Zuluaga MD  Face-to-Face encounter date: 09/22/2022    Code Status: Full Code        Chief Complaint:   Hip Pain (Pt states that she was told to got to the ED by Dr Bruce with left Hip Fractrue as pt fell when she became entangled in walker while doing laundry appprox 12 days ago.)        HPI:   Yoan Xavier is a 81 y.o. female who  has a past medical history of Hypertension and Mixed hyperlipidemia.. The patient presented to [facility] on 9/20/2022 with a primary complaint of hip pain.  Pt tripped in home 12 days ago, she felt left hip pain but was able to walk around and do her daily activities using her walker.  She seen her pcp in [place] about a week ago and xray showed hip fracture but she decided to see if it would heal on its own.  Pain persisted and increased and she presented to ED today.  Plan for surgery tomorrow.    Overview/Hospital Course:  No notes on file       Interval Hx:   The pt is resting in bed. She had no c/o, dc plan d/w pt. After I saw her, RN report pt c/o anxiety.     Review of Systems   All other systems reviewed and are negative.    Objective/physical exam:  General: In no acute distress, afebrile  Chest: Clear to auscultation bilaterally  Heart: RRR, +S1, S2, no appreciable murmur  Abdomen: Soft, nontender, BS +  MSK: s/p left hip surgery  Neurologic: Alert and oriented x4    VITAL SIGNS: 24 HRS MIN & MAX LAST   Temp  Min: 97.5 °F (36.4 °C)  Max: 98.1 °F (36.7 °C) 97.9 °F (36.6 °C)   BP  Min: 122/64  Max: 149/61 (!) 123/55     Pulse  Min: 68  Max: 78  78   Resp  Min: 16  Max: 18 16   SpO2  Min: 90 %  Max: 97 % (!) 93 %       Recent Labs   Lab 09/20/22  1546 09/21/22  1414 09/22/22  0533   WBC 12.0*  --  12.0*   RBC 4.41   --  3.85*   HGB 13.0 11.4* 11.4*   HCT 38.9 35.1* 34.7*   MCV 88.2  --  90.1   MCH 29.5  --  29.6   MCHC 33.4  --  32.9*   RDW 13.4  --  13.5     --  297   MPV 9.5  --  9.0         Recent Labs   Lab 09/20/22  1547 09/22/22  0533    136   K 4.4 4.6   CO2 27 29   BUN 21.9* 19.3   CREATININE 0.83 0.73   CALCIUM 10.0 9.0   ALBUMIN 3.6  --    ALKPHOS 65  --    ALT 9  --    AST 15  --    BILITOT 1.1  --           Microbiology Results (last 7 days)       ** No results found for the last 168 hours. **             Radiology:  X-Ray Hip 1 View Left (with Pelvis when performed)  Narrative: EXAMINATION:  XR HIP 1 VIEW LEFT (WITH PELVIS WHEN PERFORMED)    CLINICAL HISTORY:  Post op;    TECHNIQUE:  Single view of the left hip was performed.    COMPARISON:  None    FINDINGS:  There is a prosthesis in the left hip.  The alignment is within normal limits.  No fractures are seen.  Impression: Good position of left hip prosthesis.    Electronically signed by: Chin Ng MD  Date:    09/22/2022  Time:    06:47      Scheduled Med:   acetaminophen  500 mg Oral Q4H    amLODIPine  5 mg Oral Daily    aspirin  81 mg Oral BID    atorvastatin  40 mg Oral Daily    docusate sodium  200 mg Oral Daily    famotidine  20 mg Oral BID    gabapentin  300 mg Oral QHS    ketorolac  15 mg Intravenous Q6H    mupirocin   Nasal BID    polyethylene glycol  17 g Oral QHS    senna-docusate 8.6-50 mg  2 tablet Oral BID        Continuous Infusions:   sodium chloride 0.9% 100 mL/hr at 09/21/22 2037        PRN Meds:  acetaminophen, aluminum-magnesium hydroxide-simethicone, calcium carbonate, hydrALAZINE, HYDROcodone-acetaminophen, HYDROmorphone, HYDROmorphone, hydrOXYzine HCL, labetalol, lactulose, melatonin, methocarbamoL, ondansetron, ondansetron, prochlorperazine, sodium chloride 0.9%     Nutrition Status:      Assessment/Plan:  Left femoral neck fracture  Htn  Hld  Tobacco abuse  Anxiety    Plan  Add atarax prn  Pain controlled  DC dispo  pending    Gi proph: pepcid  Dvt proph: scd             All diagnosis and differential diagnosis have been reviewed; assessment and plan has been documented; I have personally reviewed the labs and test results that are presently available; I have reviewed the patients medication list; I have reviewed the consulting providers response and recommendations. I have reviewed or attempted to review medical records based upon their availability      _____________________________________________________________________            Abdullahi Carpio MD   09/22/2022

## 2022-09-22 NOTE — PT/OT/SLP PROGRESS
"Physical Therapy Treatment    Patient Name:  Yoan Xavier   MRN:  [id number]    Recommendations:     Discharge Recommendations:  nursing facility, skilled, rehabilitation facility   Discharge Equipment Recommendations: walker, rolling   Barriers to discharge: Decreased caregiver support    Assessment:     Yoan Xavier is a 81 y.o. female admitted with a medical diagnosis of Closed fracture of left hip.  She presents with the following impairments/functional limitations:  weakness, impaired endurance, impaired self care skills, impaired functional mobility, gait instability, decreased lower extremity function, decreased safety awareness, decreased ROM, impaired muscle length, orthopedic precautions, impaired cognition, impaired balance .    Rehab Prognosis: Good; patient would benefit from acute skilled PT services to address these deficits and reach maximum level of function.    Recent Surgery: Procedure(s) (LRB):  HEMIARTHROPLASTY, HIP, POSTERIOR APPROACH (Left) 1 Day Post-Op    Plan:     During this hospitalization, patient to be seen BID to address the identified rehab impairments via gait training, therapeutic activities, therapeutic exercises and progress toward the following goals:    Plan of Care Expires:  09/27/22    Subjective     Chief Complaint: "I feeling down in the dumps today "   Patient/Family Comments/goals: to get out of the bed   Pain/Comfort:  Pain Rating 1: 0/10      Objective:     Communicated with nursing  prior to session.  Patient found HOB elevated with oxygen, peripheral IV, Other (comments) (pt in bed with o2 on removed o2 for tx pts sats 94% on room air nursing aware and okayed) upon PT entry to room.     General Precautions: Standard, fall   Orthopedic Precautions:LLE weight bearing as tolerated, LLE posterior precautions   Braces: N/A  Respiratory Status: Nasal cannula, flow 2 L/min removed okayed with nursing o2 monitored stayed 92% and above during tx nursing okayed pt remain " on room air      Functional Mobility:  Bed Mobility:     Supine to Sit: contact guard assistance and vc for left hip pxs use of bed rails increased time   Transfers:     Sit to Stand:  minimum assistance and with vc for hip pxs  with rolling walker and gt belt   Bed to Chair: contact guard assistance and vc for safety with proximity within rw and to decrease THERESA  with  rolling walker and requiring assist of one   using  Step Transfer  Gait: pt amb use of rw cga assist one one with vc for safety to stay within rw left le wbat 10ft then 20ft increased time slow balbir       Therapeutic Activities and Exercises:   Pt performed supine HEP per SANDRA one set 10reps with handouts in room and reviewed for home also reviewed left hip pxs with pt     Patient left up in chair with all lines intact, call button in reach, nursing  present, and pts depressed affect improved during nursing informed BLE elevated on room air nursing okayed o2 sats 92% nursing to assess during am  pt not appropriate to attempt car,curb , steps at this time will attempt when pt appropriate  ..    GOALS:   Multidisciplinary Problems       Physical Therapy Goals          Problem: Physical Therapy    Goal Priority Disciplines Outcome Goal Variances Interventions   Physical Therapy Goal     PT, PT/OT Ongoing, Progressing     Description: Pt will improve functional independence by performing:    Bed mobility: SBA  Sit to stand: SBA   Ambulation x 150' feet with Min A  and rolling walker  1 Step (Curb): Min A  and rolling walker  3 Steps: Min A  and B HR  Independent with total knee HEP                           Time Tracking:     PT Received On: 09/22/22  PT Start Time: 1015     PT Stop Time: 1100  PT Total Time (min): 45 min     Billable Minutes: Gait Training 15min, Therapeutic Activity 15min, and Therapeutic Exercise 15min    Treatment Type: Treatment  PT/PTA: PTA     PTA Visit Number: 1     09/22/2022

## 2022-09-22 NOTE — PLAN OF CARE
Problem: Adult Inpatient Plan of Care  Goal: Plan of Care Review  9/22/2022 1014 by Gilda Culver LPN  Outcome: Ongoing, Progressing  9/22/2022 1014 by Gilda Culver LPN  Outcome: Ongoing, Progressing  Goal: Patient-Specific Goal (Individualized)  9/22/2022 1014 by Gilda Culver LPN  Outcome: Ongoing, Progressing  9/22/2022 1014 by Gilda Culver LPN  Outcome: Ongoing, Progressing  Goal: Absence of Hospital-Acquired Illness or Injury  9/22/2022 1014 by Gilda Culver LPN  Outcome: Ongoing, Progressing  9/22/2022 1014 by Gilda Culver LPN  Outcome: Ongoing, Progressing  Goal: Optimal Comfort and Wellbeing  9/22/2022 1014 by Gilda Culver LPN  Outcome: Ongoing, Progressing  9/22/2022 1014 by Gilda Culver LPN  Outcome: Ongoing, Progressing  Goal: Readiness for Transition of Care  9/22/2022 1014 by iGlda Culver LPN  Outcome: Ongoing, Progressing  9/22/2022 1014 by Gilda Culver LPN  Outcome: Ongoing, Progressing     Problem: Fall Injury Risk  Goal: Absence of Fall and Fall-Related Injury  9/22/2022 1014 by Gilda Culver LPN  Outcome: Ongoing, Progressing  9/22/2022 1014 by Gilda Culver LPN  Outcome: Ongoing, Progressing     Problem: Pain Acute  Goal: Acceptable Pain Control and Functional Ability  9/22/2022 1014 by Gilda Culver LPN  Outcome: Ongoing, Progressing  9/22/2022 1014 by Gilda Culver LPN  Outcome: Ongoing, Progressing     Problem: Infection  Goal: Absence of Infection Signs and Symptoms  9/22/2022 1014 by Gilda Culver LPN  Outcome: Ongoing, Progressing  9/22/2022 1014 by Gilda Culver LPN  Outcome: Ongoing, Progressing

## 2022-09-22 NOTE — CONSULTS
"Inpatient Nutrition Evaluation    Admit Date: 9/20/2022   Total duration of encounter: 2 days    Nutrition Recommendation/Prescription     Continue current diet as tolerated    Nutrition Assessment     Chart Review    Reason Seen: continuous nutrition monitoring - Hip >74 y/o     Diagnosis:  Left femoral neck fracture  Htn  Hld  Tobacco abuse   Left femoral neck fracture  Htn  Hld  Tobacco abuse       Relevant Medical History:   Past Medical History:   Diagnosis Date    COPD (chronic obstructive pulmonary disease)     Depression     Hypertension     Insomnia     Lumbar degenerative disc disease     Mixed hyperlipidemia      Nutrition-Related Medications: statin, pepcid, colace, miralax, senna   IVF NS@100 mL/hr     Nutrition-Related Labs:  9/22: H/H 11.4/34.7(L), Gluc 139(H), GFR >60    Diet Order: Diet Adult Regular  Oral Supplement Order: none at this time  Appetite/Oral Intake: good/% of meals  Factors Affecting Nutritional Intake: none identified at this time  Food/Muslim/Cultural Preferences: none reported    Wound(s):   incision lt hip     Comments    Pt reports good appetite and jokes "You never have to worry about me not eating". Tells me is getting plenty to eat. Denies any N/V/D or C/S difficulties. Denies any recent wt loss or any nutrition related concerns at this time.     Anthropometrics    Height: 5' 8" (172.7 cm) Height Method: Stated  Last Weight: 68 kg (150 lb) (09/20/22 1353) Weight Method: Stated  BMI (Calculated): 22.8  BMI Classification: normal (BMI 18.5-24.9)     Ideal Body Weight (IBW), Female: 140 lb     % Ideal Body Weight, Female (lb): 107.14 %    Usual Weight Provided By: patient    Wt Readings from Last 5 Encounters:   09/20/22 68 kg (150 lb)     Weight Change(s) Since Admission:  Admit Weight: 68 kg (150 lb) (09/20/22 1353)   lb     Patient Education    Not applicable.    Monitoring & Evaluation     Dietitian will monitor food and beverage intake, energy intake, weight, " and weight change.  Nutrition Risk/Follow-Up: low (follow-up in 5-7 days)  Patients assigned 'low nutrition risk' status do not qualify for a full nutritional assessment but will be monitored and re-evaluated in a 5-7 day time period.

## 2022-09-23 ENCOUNTER — HOSPITAL ENCOUNTER (INPATIENT)
Facility: HOSPITAL | Age: 82
LOS: 10 days | Discharge: HOME HEALTH | DRG: 536 | End: 2022-10-03
Attending: INTERNAL MEDICINE | Admitting: INTERNAL MEDICINE
Payer: MEDICARE

## 2022-09-23 VITALS
WEIGHT: 150 LBS | RESPIRATION RATE: 16 BRPM | TEMPERATURE: 98 F | HEART RATE: 80 BPM | HEIGHT: 68 IN | DIASTOLIC BLOOD PRESSURE: 61 MMHG | BODY MASS INDEX: 22.73 KG/M2 | SYSTOLIC BLOOD PRESSURE: 149 MMHG | OXYGEN SATURATION: 93 %

## 2022-09-23 DIAGNOSIS — S72.032A: ICD-10-CM

## 2022-09-23 LAB
ANION GAP SERPL CALC-SCNC: 8 MEQ/L
BUN SERPL-MCNC: 24.3 MG/DL (ref 9.8–20.1)
CALCIUM SERPL-MCNC: 9.2 MG/DL (ref 8.4–10.2)
CHLORIDE SERPL-SCNC: 104 MMOL/L (ref 98–107)
CO2 SERPL-SCNC: 28 MMOL/L (ref 23–31)
CREAT SERPL-MCNC: 0.63 MG/DL (ref 0.55–1.02)
CREAT/UREA NIT SERPL: 39
ERYTHROCYTE [DISTWIDTH] IN BLOOD BY AUTOMATED COUNT: 13.8 % (ref 11.5–17)
GFR SERPLBLD CREATININE-BSD FMLA CKD-EPI: >60 MLS/MIN/1.73/M2
GLUCOSE SERPL-MCNC: 82 MG/DL (ref 82–115)
HCT VFR BLD AUTO: 32.2 % (ref 37–47)
HGB BLD-MCNC: 10.6 GM/DL (ref 12–16)
MCH RBC QN AUTO: 29.6 PG (ref 27–31)
MCHC RBC AUTO-ENTMCNC: 32.9 MG/DL (ref 33–36)
MCV RBC AUTO: 89.9 FL (ref 80–94)
NRBC BLD AUTO-RTO: 0 %
PLATELET # BLD AUTO: 293 X10(3)/MCL (ref 130–400)
PMV BLD AUTO: 9.3 FL (ref 7.4–10.4)
POTASSIUM SERPL-SCNC: 4.4 MMOL/L (ref 3.5–5.1)
RBC # BLD AUTO: 3.58 X10(6)/MCL (ref 4.2–5.4)
SODIUM SERPL-SCNC: 140 MMOL/L (ref 136–145)
WBC # SPEC AUTO: 13.3 X10(3)/MCL (ref 4.5–11.5)

## 2022-09-23 PROCEDURE — 25000003 PHARM REV CODE 250: Performed by: INTERNAL MEDICINE

## 2022-09-23 PROCEDURE — 36415 COLL VENOUS BLD VENIPUNCTURE: CPT | Performed by: ORTHOPAEDIC SURGERY

## 2022-09-23 PROCEDURE — 97110 THERAPEUTIC EXERCISES: CPT

## 2022-09-23 PROCEDURE — 97166 OT EVAL MOD COMPLEX 45 MIN: CPT

## 2022-09-23 PROCEDURE — 25000003 PHARM REV CODE 250: Performed by: ORTHOPAEDIC SURGERY

## 2022-09-23 PROCEDURE — 94761 N-INVAS EAR/PLS OXIMETRY MLT: CPT

## 2022-09-23 PROCEDURE — 25000003 PHARM REV CODE 250: Performed by: NURSE PRACTITIONER

## 2022-09-23 PROCEDURE — 94799 UNLISTED PULMONARY SVC/PX: CPT

## 2022-09-23 PROCEDURE — 63600175 PHARM REV CODE 636 W HCPCS: Performed by: NURSE PRACTITIONER

## 2022-09-23 PROCEDURE — 80048 BASIC METABOLIC PNL TOTAL CA: CPT | Performed by: ORTHOPAEDIC SURGERY

## 2022-09-23 PROCEDURE — 97535 SELF CARE MNGMENT TRAINING: CPT

## 2022-09-23 PROCEDURE — 11800000 HC REHAB PRIVATE ROOM

## 2022-09-23 PROCEDURE — 85027 COMPLETE CBC AUTOMATED: CPT | Performed by: ORTHOPAEDIC SURGERY

## 2022-09-23 PROCEDURE — 97530 THERAPEUTIC ACTIVITIES: CPT

## 2022-09-23 RX ORDER — METHOCARBAMOL 750 MG/1
750 TABLET, FILM COATED ORAL 4 TIMES DAILY PRN
Status: DISCONTINUED | OUTPATIENT
Start: 2022-09-23 | End: 2022-10-03 | Stop reason: HOSPADM

## 2022-09-23 RX ORDER — ACETAMINOPHEN 500 MG
500 TABLET ORAL EVERY 4 HOURS
Qty: 72 TABLET | Refills: 0 | Status: ON HOLD
Start: 2022-09-23 | End: 2022-09-29

## 2022-09-23 RX ORDER — DOCUSATE SODIUM 100 MG/1
100 CAPSULE, LIQUID FILLED ORAL 2 TIMES DAILY
Status: DISCONTINUED | OUTPATIENT
Start: 2022-09-23 | End: 2022-10-03 | Stop reason: HOSPADM

## 2022-09-23 RX ORDER — MIRTAZAPINE 15 MG/1
30 TABLET, FILM COATED ORAL NIGHTLY
Status: DISCONTINUED | OUTPATIENT
Start: 2022-09-23 | End: 2022-10-03 | Stop reason: HOSPADM

## 2022-09-23 RX ORDER — TRAMADOL HYDROCHLORIDE 50 MG/1
50 TABLET ORAL EVERY 4 HOURS PRN
Status: ON HOLD
Start: 2022-09-23 | End: 2022-09-29 | Stop reason: SDUPTHER

## 2022-09-23 RX ORDER — FAMOTIDINE 20 MG/1
20 TABLET, FILM COATED ORAL 2 TIMES DAILY PRN
Status: DISCONTINUED | OUTPATIENT
Start: 2022-09-23 | End: 2022-10-03 | Stop reason: HOSPADM

## 2022-09-23 RX ORDER — BUPROPION HYDROCHLORIDE 150 MG/1
300 TABLET ORAL DAILY
Status: DISCONTINUED | OUTPATIENT
Start: 2022-09-23 | End: 2022-10-03 | Stop reason: HOSPADM

## 2022-09-23 RX ORDER — TRAMADOL HYDROCHLORIDE 50 MG/1
50 TABLET ORAL EVERY 4 HOURS PRN
Status: DISCONTINUED | OUTPATIENT
Start: 2022-09-23 | End: 2022-10-03 | Stop reason: HOSPADM

## 2022-09-23 RX ORDER — BUPROPION HYDROCHLORIDE 150 MG/1
300 TABLET ORAL DAILY
Status: DISCONTINUED | OUTPATIENT
Start: 2022-09-23 | End: 2022-09-23 | Stop reason: HOSPADM

## 2022-09-23 RX ORDER — AMLODIPINE BESYLATE 5 MG/1
5 TABLET ORAL DAILY
Status: DISCONTINUED | OUTPATIENT
Start: 2022-09-23 | End: 2022-10-03 | Stop reason: HOSPADM

## 2022-09-23 RX ORDER — CARVEDILOL 6.25 MG/1
12.5 TABLET ORAL 2 TIMES DAILY
Status: DISCONTINUED | OUTPATIENT
Start: 2022-09-23 | End: 2022-09-23 | Stop reason: HOSPADM

## 2022-09-23 RX ORDER — FAMOTIDINE 20 MG/1
20 TABLET, FILM COATED ORAL 2 TIMES DAILY
Status: DISCONTINUED | OUTPATIENT
Start: 2022-09-23 | End: 2022-10-03 | Stop reason: HOSPADM

## 2022-09-23 RX ORDER — NAPROXEN SODIUM 220 MG/1
81 TABLET, FILM COATED ORAL 2 TIMES DAILY
Qty: 84 TABLET | Refills: 0 | Status: ON HOLD
Start: 2022-09-23 | End: 2022-09-29 | Stop reason: SDUPTHER

## 2022-09-23 RX ORDER — ENOXAPARIN SODIUM 100 MG/ML
40 INJECTION SUBCUTANEOUS EVERY 24 HOURS
Status: DISCONTINUED | OUTPATIENT
Start: 2022-09-23 | End: 2022-10-03 | Stop reason: HOSPADM

## 2022-09-23 RX ORDER — ACETAMINOPHEN 500 MG
500 TABLET ORAL
Status: DISCONTINUED | OUTPATIENT
Start: 2022-09-23 | End: 2022-09-23 | Stop reason: HOSPADM

## 2022-09-23 RX ORDER — POLYETHYLENE GLYCOL 3350 17 G/17G
17 POWDER, FOR SOLUTION ORAL NIGHTLY
Status: DISCONTINUED | OUTPATIENT
Start: 2022-09-23 | End: 2022-09-27

## 2022-09-23 RX ORDER — HYDROXYZINE HYDROCHLORIDE 25 MG/1
25 TABLET, FILM COATED ORAL 4 TIMES DAILY PRN
Status: ON HOLD
Start: 2022-09-23 | End: 2022-09-29

## 2022-09-23 RX ORDER — LISINOPRIL 10 MG/1
10 TABLET ORAL DAILY
Status: DISCONTINUED | OUTPATIENT
Start: 2022-09-23 | End: 2022-09-24

## 2022-09-23 RX ORDER — GABAPENTIN 300 MG/1
300 CAPSULE ORAL NIGHTLY
Status: DISCONTINUED | OUTPATIENT
Start: 2022-09-23 | End: 2022-10-03 | Stop reason: HOSPADM

## 2022-09-23 RX ORDER — LABETALOL HCL 20 MG/4 ML
10 SYRINGE (ML) INTRAVENOUS EVERY 4 HOURS PRN
Status: DISCONTINUED | OUTPATIENT
Start: 2022-09-23 | End: 2022-10-03 | Stop reason: HOSPADM

## 2022-09-23 RX ORDER — ATORVASTATIN CALCIUM 40 MG/1
40 TABLET, FILM COATED ORAL DAILY
Status: DISCONTINUED | OUTPATIENT
Start: 2022-09-23 | End: 2022-10-03 | Stop reason: HOSPADM

## 2022-09-23 RX ORDER — HYDRALAZINE HYDROCHLORIDE 20 MG/ML
10 INJECTION INTRAMUSCULAR; INTRAVENOUS EVERY 6 HOURS PRN
Status: DISCONTINUED | OUTPATIENT
Start: 2022-09-23 | End: 2022-10-03 | Stop reason: HOSPADM

## 2022-09-23 RX ORDER — BUDESONIDE AND FORMOTEROL FUMARATE DIHYDRATE 160; 4.5 UG/1; UG/1
2 AEROSOL RESPIRATORY (INHALATION) 2 TIMES DAILY
Status: DISCONTINUED | OUTPATIENT
Start: 2022-09-23 | End: 2022-09-23 | Stop reason: CLARIF

## 2022-09-23 RX ORDER — POLYETHYLENE GLYCOL 3350 17 G/17G
17 POWDER, FOR SOLUTION ORAL NIGHTLY
Refills: 0 | Status: ON HOLD
Start: 2022-09-23 | End: 2022-09-29

## 2022-09-23 RX ORDER — CARVEDILOL 6.25 MG/1
12.5 TABLET ORAL 2 TIMES DAILY
Status: DISCONTINUED | OUTPATIENT
Start: 2022-09-23 | End: 2022-10-03 | Stop reason: HOSPADM

## 2022-09-23 RX ORDER — NAPROXEN SODIUM 220 MG/1
81 TABLET, FILM COATED ORAL 2 TIMES DAILY
Status: DISCONTINUED | OUTPATIENT
Start: 2022-09-23 | End: 2022-10-03 | Stop reason: HOSPADM

## 2022-09-23 RX ORDER — TRAMADOL HYDROCHLORIDE 50 MG/1
50 TABLET ORAL EVERY 4 HOURS PRN
Status: DISCONTINUED | OUTPATIENT
Start: 2022-09-23 | End: 2022-09-23 | Stop reason: HOSPADM

## 2022-09-23 RX ORDER — ACETAMINOPHEN 500 MG
500 TABLET ORAL EVERY 4 HOURS
Status: DISCONTINUED | OUTPATIENT
Start: 2022-09-23 | End: 2022-10-03 | Stop reason: HOSPADM

## 2022-09-23 RX ORDER — BENZONATATE 100 MG/1
100 CAPSULE ORAL 3 TIMES DAILY PRN
Status: DISCONTINUED | OUTPATIENT
Start: 2022-09-23 | End: 2022-10-03 | Stop reason: HOSPADM

## 2022-09-23 RX ORDER — LISINOPRIL 10 MG/1
10 TABLET ORAL DAILY
Status: DISCONTINUED | OUTPATIENT
Start: 2022-09-23 | End: 2022-09-23 | Stop reason: HOSPADM

## 2022-09-23 RX ORDER — METOPROLOL TARTRATE 1 MG/ML
10 INJECTION, SOLUTION INTRAVENOUS
Status: DISCONTINUED | OUTPATIENT
Start: 2022-09-23 | End: 2022-10-03 | Stop reason: HOSPADM

## 2022-09-23 RX ORDER — HYDROXYZINE PAMOATE 50 MG/1
50 CAPSULE ORAL NIGHTLY PRN
Status: DISCONTINUED | OUTPATIENT
Start: 2022-09-23 | End: 2022-10-03 | Stop reason: HOSPADM

## 2022-09-23 RX ORDER — ONDANSETRON 4 MG/1
4 TABLET, ORALLY DISINTEGRATING ORAL EVERY 6 HOURS PRN
Status: DISCONTINUED | OUTPATIENT
Start: 2022-09-23 | End: 2022-10-03 | Stop reason: HOSPADM

## 2022-09-23 RX ORDER — DOCUSATE SODIUM 100 MG/1
200 CAPSULE, LIQUID FILLED ORAL DAILY
Refills: 0 | Status: ON HOLD
Start: 2022-09-24 | End: 2022-09-29

## 2022-09-23 RX ORDER — NITROGLYCERIN 0.4 MG/1
0.4 TABLET SUBLINGUAL EVERY 5 MIN PRN
Status: DISCONTINUED | OUTPATIENT
Start: 2022-09-23 | End: 2022-10-03 | Stop reason: HOSPADM

## 2022-09-23 RX ORDER — FLUTICASONE FUROATE AND VILANTEROL 200; 25 UG/1; UG/1
1 POWDER RESPIRATORY (INHALATION) DAILY
Status: DISCONTINUED | OUTPATIENT
Start: 2022-09-23 | End: 2022-10-03 | Stop reason: HOSPADM

## 2022-09-23 RX ORDER — MIRTAZAPINE 15 MG/1
30 TABLET, FILM COATED ORAL NIGHTLY
Status: DISCONTINUED | OUTPATIENT
Start: 2022-09-23 | End: 2022-09-23 | Stop reason: HOSPADM

## 2022-09-23 RX ADMIN — DOCUSATE SODIUM 200 MG: 100 CAPSULE, LIQUID FILLED ORAL at 07:09

## 2022-09-23 RX ADMIN — SENNOSIDES AND DOCUSATE SODIUM 2 TABLET: 8.6; 5 TABLET ORAL at 07:09

## 2022-09-23 RX ADMIN — MIRTAZAPINE 30 MG: 15 TABLET, FILM COATED ORAL at 08:09

## 2022-09-23 RX ADMIN — HYDROCODONE BITARTRATE AND ACETAMINOPHEN 1 TABLET: 5; 325 TABLET ORAL at 07:09

## 2022-09-23 RX ADMIN — ASPIRIN 81 MG 81 MG: 81 TABLET ORAL at 08:09

## 2022-09-23 RX ADMIN — TRAMADOL HYDROCHLORIDE 50 MG: 50 TABLET, COATED ORAL at 12:09

## 2022-09-23 RX ADMIN — BUPROPION HYDROCHLORIDE 300 MG: 150 TABLET, FILM COATED, EXTENDED RELEASE ORAL at 12:09

## 2022-09-23 RX ADMIN — CARVEDILOL 12.5 MG: 6.25 TABLET, FILM COATED ORAL at 12:09

## 2022-09-23 RX ADMIN — ACETAMINOPHEN 500 MG: 500 TABLET ORAL at 12:09

## 2022-09-23 RX ADMIN — GABAPENTIN 300 MG: 300 CAPSULE ORAL at 08:09

## 2022-09-23 RX ADMIN — ATORVASTATIN CALCIUM 40 MG: 40 TABLET, FILM COATED ORAL at 12:09

## 2022-09-23 RX ADMIN — ENOXAPARIN SODIUM 40 MG: 40 INJECTION SUBCUTANEOUS at 05:09

## 2022-09-23 RX ADMIN — FAMOTIDINE 20 MG: 20 TABLET, FILM COATED ORAL at 07:09

## 2022-09-23 RX ADMIN — ACETAMINOPHEN 500 MG: 500 TABLET ORAL at 04:09

## 2022-09-23 RX ADMIN — AMLODIPINE BESYLATE 5 MG: 5 TABLET ORAL at 07:09

## 2022-09-23 RX ADMIN — LISINOPRIL 10 MG: 10 TABLET ORAL at 12:09

## 2022-09-23 RX ADMIN — ACETAMINOPHEN 500 MG: 500 TABLET ORAL at 05:09

## 2022-09-23 RX ADMIN — FAMOTIDINE 20 MG: 20 TABLET ORAL at 08:09

## 2022-09-23 RX ADMIN — CARVEDILOL 12.5 MG: 6.25 TABLET, FILM COATED ORAL at 08:09

## 2022-09-23 RX ADMIN — ASPIRIN 81 MG CHEWABLE TABLET 81 MG: 81 TABLET CHEWABLE at 07:09

## 2022-09-23 RX ADMIN — ACETAMINOPHEN 500 MG: 500 TABLET ORAL at 08:09

## 2022-09-23 NOTE — CONSULTS
Chief Complaint  left femoral neck fracture    Reason for Consultation  Physiatry    History of Present Illness  Admit MD: Vasiliy Connell MD  Consult Physiatry: Vernon Manrique MD  HPI:  82 yo female with PMH of Hypertension and Mixed hyperlipidemia presented to [facility] ED on 9/20/2022 with a primary complaint of hip pain.  She tripped in home 12 days ago, and states that she felt left hip pain but was able to walk around and do her daily activities using her walker.  She saw her PCP in [place] about a week ago and xray showed hip fracture but she decided to see if it would heal on its own.  Pain persisted and increased therfore she presented to ED.  Diagnosed with a left femoral neck fracture.  Ortho consulted. Underwent left hip hemiarthroplasty on 9/21. Participating with therapy. Functional status includes bed mobility and transfers requiring minimal assistance. Amb w/RW for 10ft and 20ft with CGA. Patient was evaluated, accepted, and admitted to inpatient rehab to improve functional status. Transferred to [facility] on 9/23 without incident.      9/26: Seen in patient room, standing at sink with RW and brushing teeth with CNA on stand by. Reports good sleep, appetite, and bowels moving day before yesterday. Tells me she had a good weekend and her pain is controlled with medication. No current complaints. VSSAF with noted bradycardia (HR 55). IM following.     Review of Systems  Barriers to Discharge:  Social: Pt. Completed high school. No  history. 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  Pt. Is single. Never . She lives alone in assisted living and is very independent. Children (0).     Medical: Left femoral neck fracture 2/2 fall, COPD, hypertension, hyperlipidemia, tobacco abuse     Functional:   Prior Level of Fx: Independent ADLs, drives, cooks small meals, does chores, grocery shops, manages finances, and manages own meds. She does not have a medic alert.   Residence: Pt.  Lives alone at [facility] in [place]. No steps to enter. Pt. Is unsure if it is a tub/shower or walk-in shower. She states that it has a HHS and grab bars. She has an elevated toilet with grab bars.    DME: RW.   Psychiatric:  Hx mental health/substance abuse: Pt. Reports history of depression a long time ago but not recently. She is a smoker.    Depression/Anxiety:    buPROPion TB24 tablet 300 mg qd  mirtazapine tablet 30 mg qHS  Pain: left hip-controlled  acetaminophen tablet 500 mg q4hr   gabapentin capsule 300 mg qHS  methocarbamoL tablet 750 mg QID PRN spasm  traMADoL tablet 50 mg q4hr PRN pain  Bowels/Bladder: last BM 9/24 per patient (documented 9/23)   Appetite: good    Sleep: good      mirtazapine tablet 30 mg qHS    Physical Exam  General: well-developed, well-nourished, in no acute distress  Eye: EOMI, clear conjunctiva, eyelids normal  HENT: normocephalic, oropharynx and nasal mucosal surfaces moist  Neck: full range of motion, supple  Respiratory: equal chest rise, no SOB, no audible wheeze  Cardiovascular: regular rate and rhythm, no edema  Gastrointestinal: soft, non-tender, non-distended   Musculoskeletal: decreased ROM/strength to LLE  Integumentary: no rashes or skin lesions present, left hip incision-dressing-c/d/i  Neurologic: cranial nerves intact, no signs of peripheral neurological deficit, motor/sensory function intact  *MD performed and documented physical examination     Labs:   Latest Reference Range & Units 09/26/22 05:26   WBC 4.5 - 11.5 x10(3)/mcL 9.6   RBC 4.20 - 5.40 x10(6)/mcL 3.64 (L)   Hemoglobin 12.0 - 16.0 gm/dL 10.5 (L)   Hematocrit 37.0 - 47.0 % 32.0 (L)   MCV 80.0 - 94.0 fL 87.9   MCH 27.0 - 31.0 pg 28.8   MCHC 33.0 - 36.0 mg/dL 32.8 (L)   RDW 11.5 - 17.0 % 13.8   Platelets 130 - 400 x10(3)/mcL 353   MPV 7.4 - 10.4 fL 8.9   Neut % % 75.0   LYMPH % % 12.4   Mono % % 10.1   Eosinophil % % 1.9   Basophil % % 0.3   Immature Granulocytes % 0.3   Neut # 2.1 - 9.2  x10(3)/mcL 7.2   Lymph # 0.6 - 4.6 x10(3)/mcL 1.19   Mono # 0.1 - 1.3 x10(3)/mcL 0.97   Eos # 0 - 0.9 x10(3)/mcL 0.18   Baso # 0 - 0.2 x10(3)/mcL 0.03   Immature Grans (Abs) 0 - 0.04 x10(3)/mcL 0.03   nRBC % 0.0   Sodium 136 - 145 mmol/L 139   Potassium 3.5 - 5.1 mmol/L 4.4   Chloride 98 - 107 mmol/L 103   CO2 23 - 31 mmol/L 29   BUN 9.8 - 20.1 mg/dL 11.0   Creatinine 0.55 - 1.02 mg/dL 0.62   eGFR mls/min/1.73/m2 >60   Glucose 82 - 115 mg/dL 87   Calcium 8.4 - 10.2 mg/dL 9.6   Phosphorus 2.3 - 4.7 mg/dL 3.2   Magnesium 1.60 - 2.60 mg/dL 1.60   Alkaline Phosphatase 40 - 150 unit/L 50   PROTEIN TOTAL 5.8 - 7.6 gm/dL 6.0   Albumin 3.4 - 4.8 gm/dL 2.5 (L)   Albumin/Globulin Ratio 1.1 - 2.0 ratio 0.7 (L)   Prealbumin 14.0 - 37.0 mg/dL 10.8 (L)   BILIRUBIN TOTAL <=1.5 mg/dL 0.6   AST 5 - 34 unit/L 19   ALT 0 - 55 unit/L 13   Globulin, Total 2.4 - 3.5 gm/dL 3.5      Latest Reference Range & Units 09/24/22 05:01   Iron 50 - 170 ug/dL 17 (L)   TIBC 250 - 450 ug/dL 125 (L)   Iron Binding Capacity Unsaturated 70 - 310 ug/dL 108   Transferrin 173 - 360 mg/dL 111 (L)   Ferritin 4.63 - 204.00 ng/mL 596.23 (H)   Iron Saturation 20 - 50 % 14 (L)      Latest Reference Range & Units 09/24/22 05:01   Cholesterol <=200 mg/dL 100   HDL 35 - 60 mg/dL 25 (L)   LDL Cholesterol External 50.00 - 140.00 mg/dL 62.00   Total Cholesterol/HDL Ratio 0 - 5  4   Triglycerides 37 - 140 mg/dL 66   Very Low Density Lipoprotein  13   (H): Data is abnormally high  (L): Data is abnormally low    Diagnostics:  XR HIP WITH PELVIS WHEN PERFORMED, 2 OR 3 VIEWS LEFT  IMPRESSION  Acute left femoral neck fracture.  Date:                                            09/20/2022  Time:                                           16:23    XR HIP 1 VIEW LEFT (WITH PELVIS WHEN PERFORMED)  Impression:  Good position of left hip prosthesis.  Date:                                            09/22/2022  Time:                                           06:47      Assessment/Plan  Hospital   Closed displaced midcervical fracture of left femur     Non-Hospital   Hypertension (Chronic)   Mixed hyperlipidemia (Chronic)   COPD (chronic obstructive pulmonary disease) (Chronic)   Depression (Chronic)   Lumbar degenerative disc disease (Chronic)       Wounds: left hip incision-dressing-c/d/i  S/p left hip hemiarthroplasty on 9/21  Precautions: WBAT LLE  Bracing/AD: RW  Swallowing: Regular Diet  Function: Tolerating therapy. Continue PT/OT  VTE Prophylaxis:   enoxaparin injection 40 mg SubQ q24hr  Code Status: FULL CODE   Discharge: Lives alone at [facility] in [place]. No steps to enter. Completed high school. No  history. 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  Pt. Is single. Never . She lives alone in assisted living and is very independent. Children (0).  Date pending.     Dos 9/23/22  I have evaluated the patient on initial IRF admit. I have been involved in rehab prescreen. I have reviewed records.I have reviewed admit orders.I have discussed case with IM team.  I find the patient appropriate for, in need of and should tolerate an aggressive IRF program with good potential for functional improvement.  I am in agreement with initial Plan of Care  I have been involved in the creation of this initial PM&R consult with Nnea Painter NP, conducted additional independent physical examination and assisted with medical documentation.

## 2022-09-23 NOTE — PLAN OF CARE
Problem: Physical Therapy  Goal: Physical Therapy Goal  Description: Pt will improve functional independence by performing:    Bed mobility: SBA  Sit to stand: SBA   Ambulation x 150' feet with Min A  and rolling walker  1 Step (Curb): Min A  and rolling walker  3 Steps: Min A  and B HR  Independent with total knee HEP      Outcome: Adequate for Care Transition

## 2022-09-23 NOTE — PROGRESS NOTES
"No acute events overnight.  Pain controlled.  Resting in bed. Ambulating with therapy.    Vital Signs  Temp: 98 °F (36.7 °C)  Temp src: Oral  Pulse: 86  Resp: 16  SpO2: (!) 93 %  Pulse Oximetry Type: Intermittent  Flow (L/min): 2  Oxygen Concentration (%): 80  O2 Device (Oxygen Therapy): room air  BP: (!) 172/67  BP Location: Right arm  BP Method: Automatic  Patient Position: Sitting  Height and Weight  Height: 5' 8" (172.7 cm)  Height Method: Stated  Weight: 68 kg (150 lb)  Weight Method: Stated  BSA (Calculated - sq m): 1.81 sq meters  BMI (Calculated): 22.8  Weight in (lb) to have BMI = 25: 164.1  Patient Observation  Observations: Pt relates fall at [facility] in [place] with xray obtained and review of films by Dr Zuluaga with referral to Dr Bruce]    +FHL/EHL  BCR distally  Dressing c/d/i  SILT distally    Recent Lab Results         09/23/22  0513        Anion Gap 8.0       BUN 24.3       BUN/CREAT RATIO 39       Calcium 9.2       Chloride 104       CO2 28       Creatinine 0.63       eGFR >60       Glucose 82       Hematocrit 32.2       Hemoglobin 10.6       MCH 29.6       MCHC 32.9       MCV 89.9       MPV 9.3       nRBC 0.0       Platelets 293       Potassium 4.4       RBC 3.58       RDW 13.8       Sodium 140       WBC 13.3               A/P:  Status post L hip hemiarthroplasty   Pain controlled  Overall patient doing well.  Therapy for mobility and ambulation.  DVT PPx  Placement when able  "

## 2022-09-23 NOTE — PT/OT/SLP PROGRESS
Occupational Therapy   Treatment    Name: Yoan Xavier  MRN: [id number]  Admitting Diagnosis:  Closed fracture of left hip  2 Days Post-Op    Recommendations:     Discharge Recommendations: nursing facility, skilled  Discharge Equipment Recommendations:  hip kit, walker, rolling  Barriers to discharge:       Assessment:     Yoan Xavier is a 81 y.o. female with a medical diagnosis of Closed fracture of left hip.  She presents with L hip pain and weakness. Performance deficits affecting function are  .     Rehab Prognosis:  Good; patient would benefit from acute skilled OT services to address these deficits and reach maximum level of function.       Plan:     Patient to be seen  (QD>BID) to address the above listed problems via self-care/home management, therapeutic activities, therapeutic exercises  Plan of Care Expires: 09/28/22  Plan of Care Reviewed with: patient    Subjective     Pain/Comfort:  Pain Rating 1: 4/10  Location - Side 1: Left  Location 1: hip  Pain Addressed 1: Nurse notified    Objective:     Communicated with: NSSONIDO prior to session.  Patient found up in chair with peripheral IV, Other (comments) upon OT entry to room.    General Precautions: Standard, fall   Orthopedic Precautions:LLE weight bearing as tolerated, LLE posterior precautions   Braces: N/A  Respiratory Status: Room air     Occupational Performance:       Functional Mobility/Transfers:  Patient completed Sit <> Stand Transfer with contact guard assistance  with  rolling walker   Functional Mobility: Able to ambulate in room x3 laps with verbal cueing for technique to increase weight shift onto L leg.     Activities of Daily Living:  Grooming: independence seated in chair          Treatment & Education:  Ongoing safety and fall prevention training; educated on rehab requirements and general schedule; addressed questions related to ADLs and rehab.    Patient left up in chair with all lines intact and call button in reach    GOALS:    Multidisciplinary Problems       Occupational Therapy Goals       Not on file                    Time Tracking:     OT Date of Treatment: 09/23/22  OT Start Time: 1005  OT Stop Time: 1025  OT Total Time (min): 20 min    Billable Minutes:Therapeutic Activity 20 9/23/2022

## 2022-09-23 NOTE — PLAN OF CARE
Problem: Adult Inpatient Plan of Care  Goal: Plan of Care Review  Outcome: Adequate for Care Transition  Goal: Patient-Specific Goal (Individualized)  Outcome: Adequate for Care Transition  Goal: Absence of Hospital-Acquired Illness or Injury  Outcome: Adequate for Care Transition  Goal: Optimal Comfort and Wellbeing  Outcome: Adequate for Care Transition  Goal: Readiness for Transition of Care  Outcome: Adequate for Care Transition     Problem: Fall Injury Risk  Goal: Absence of Fall and Fall-Related Injury  Outcome: Adequate for Care Transition     Problem: Pain Acute  Goal: Acceptable Pain Control and Functional Ability  Outcome: Adequate for Care Transition     Problem: Infection  Goal: Absence of Infection Signs and Symptoms  Outcome: Adequate for Care Transition     Problem: Postoperative Urinary Retention (Hip Arthroplasty)  Goal: Effective Urinary Elimination  Outcome: Adequate for Care Transition     Problem: Respiratory Compromise (Hip Arthroplasty)  Goal: Effective Oxygenation and Ventilation  Outcome: Adequate for Care Transition

## 2022-09-23 NOTE — PLAN OF CARE
ADLs:  STG:Pt to perform grooming tasks with SUP and standing at sink  LTG:Pt to perform grooming tasks with MI and standing at sink      LTG: Pt to perform UB dressing with MI and seated    STG:Pt to perform LB dressing with min A and AE  LTG:Pt to perform LB dressing with MI and AE     STG: Pt to perform putting on/off footwear task with min A and AE  LTG: Pt to perform putting on/off footwear task with MI and AE    STG: Pt to perform toileting with set up using BSC frame  LTG: Pt to perform toileting with MI using BSC frame    Functional Transfers:  STG: Pt to perform toilet transfers with SUP and RW  LTG: Pt to perform toilet transfers with MI  and RW    STG: Pt to perform a tub transfer with SUP and tub bench  LTG: Pt to perform a tub transfer with MI and tub bench

## 2022-09-23 NOTE — DISCHARGE SUMMARY
[facility]   [facility] Medicine Discharge Summary    Admit Date: 9/20/2022  Discharge Date and Time: 9/23/202210:19 AM  Admitting Physician: [unfilled]  Discharging Physician: Abdullahi Carpio MD.  Primary Care Physician: Ronal Zuluaga MD  Consults (From admission, onward)          Status Ordering Provider     Inpatient consult to Respiratory Care  Once        Provider:  (Not yet assigned)    Acknowledged HERMANN CHRISTENSEN     IP consult case management/social work  Once        Provider:  (Not yet assigned)    Acknowledged HERMANN CHRISTENSEN                 Discharge Diagnoses:  Left femoral neck fracture s/p Left Hip Hemiarthroplasty  Htn  Hld  Tobacco abuse        HPI  Yoan Xavier is a 81 y.o. female who  has a past medical history of Hypertension and Mixed hyperlipidemia.. The patient presented to [facility] on 9/20/2022 with a primary complaint of hip pain.  Pt tripped in home 12 days ago, she felt left hip pain but was able to walk around and do her daily activities using her walker.  She seen her pcp in [place] about a week ago and xray showed hip fracture but she decided to see if it would heal on its own.  Pain persisted and increased and she presented to ED 9/20/22.      Hospital Course:   The pt was admitted and ortho consulted. She had her fracture repaired on 9/21/22 by Dr. Christensen. Her postop course was uneventful. She will be discharged to rehab today.       Pt was seen and examined on the day of discharge. DC plan d/w patient, all questions answered.    Vitals:  VITAL SIGNS: 24 HRS MIN & MAX LAST   Temp  Min: 97.8 °F (36.6 °C)  Max: 98.2 °F (36.8 °C) 98.2 °F (36.8 °C)   BP  Min: 114/56  Max: 172/67 (!) 149/61   Pulse  Min: 67  Max: 86  80   Resp  Min: 16  Max: 18 16   SpO2  Min: 91 %  Max: 94 % (!) 93 %         Physical Exam:  General: In no acute distress, afebrile  Chest: Clear to auscultation bilaterally  Heart: RRR, +S1, S2, no appreciable  murmur  Abdomen: Soft, nontender, BS +  MSK: s/p left hip surgery  Neurologic: Alert and oriented x4      Procedures Performed:   Date: 09/21/2022  Surgeon: Christos Christensen MD  Preoperative Diagnosis:  Left femoral neck fracture  Postoperative Diagnosis: Same  Procedure:  left hemiarthroplasty    Significant Diagnostic Studies: See Full reports for all details    Recent Labs   Lab 09/20/22  1546 09/21/22  1414 09/22/22  0533 09/23/22  0513   WBC 12.0*  --  12.0* 13.3*   RBC 4.41  --  3.85* 3.58*   HGB 13.0 11.4* 11.4* 10.6*   HCT 38.9 35.1* 34.7* 32.2*   MCV 88.2  --  90.1 89.9   MCH 29.5  --  29.6 29.6   MCHC 33.4  --  32.9* 32.9*   RDW 13.4  --  13.5 13.8     --  297 293   MPV 9.5  --  9.0 9.3       Recent Labs   Lab 09/20/22  1547 09/22/22  0533 09/23/22  0513    136 140   K 4.4 4.6 4.4   CO2 27 29 28   BUN 21.9* 19.3 24.3*   CREATININE 0.83 0.73 0.63   CALCIUM 10.0 9.0 9.2   ALBUMIN 3.6  --   --    ALKPHOS 65  --   --    ALT 9  --   --    AST 15  --   --    BILITOT 1.1  --   --         Microbiology Results (last 7 days)       ** No results found for the last 168 hours. **             X-Ray Hip 1 View Left (with Pelvis when performed)  Narrative: EXAMINATION:  XR HIP 1 VIEW LEFT (WITH PELVIS WHEN PERFORMED)    CLINICAL HISTORY:  Post op;    TECHNIQUE:  Single view of the left hip was performed.    COMPARISON:  None    FINDINGS:  There is a prosthesis in the left hip.  The alignment is within normal limits.  No fractures are seen.  Impression: Good position of left hip prosthesis.    Electronically signed by: Chin Ng MD  Date:    09/22/2022  Time:    06:47         Medication List        START taking these medications      acetaminophen 500 MG tablet  Commonly known as: TYLENOL  Take 1 tablet (500 mg total) by mouth every 4 (four) hours. for 12 days     aspirin 81 MG Chew  Take 1 tablet (81 mg total) by mouth 2 (two) times daily.     docusate sodium 100 MG capsule  Commonly known as: COLACE  Take  2 capsules (200 mg total) by mouth once daily.  Start taking on: September 24, 2022     hydrOXYzine HCL 25 MG tablet  Commonly known as: ATARAX  Take 1 tablet (25 mg total) by mouth 4 (four) times daily as needed for Itching or Anxiety.     polyethylene glycol 17 gram Pwpk  Commonly known as: GLYCOLAX  Take 17 g by mouth every evening. for 14 days     traMADoL 50 mg tablet  Commonly known as: ULTRAM  Take 1 tablet (50 mg total) by mouth every 4 (four) hours as needed (pain score 1-10).            CONTINUE taking these medications      amLODIPine 5 MG tablet  Commonly known as: NORVASC     atorvastatin 40 MG tablet  Commonly known as: LIPITOR     buPROPion 300 MG 24 hr tablet  Commonly known as: WELLBUTRIN XL     carvediloL 12.5 MG tablet  Commonly known as: COREG     melatonin 5 mg  Commonly known as: MELATIN     mirtazapine 30 MG tablet  Commonly known as: REMERON     quinapriL 40 MG tablet  Commonly known as: ACCUPRIL     SYMBICORT 160-4.5 mcg/actuation Hfaa  Generic drug: budesonide-formoterol 160-4.5 mcg               Where to Get Your Medications        Information about where to get these medications is not yet available    Ask your nurse or doctor about these medications  acetaminophen 500 MG tablet  aspirin 81 MG Chew  docusate sodium 100 MG capsule  hydrOXYzine HCL 25 MG tablet  polyethylene glycol 17 gram Pwpk  traMADoL 50 mg tablet          Explained in detail to the patient the discharge plan, medications, and follow-up visits. Pt understands and agrees with the treatment plan.  Discharge Disposition: Rehab Facility  Discharged Condition: stable  Diet-   Dietary Orders (From admission, onward)       Start     Ordered    09/21/22 1614  Diet Adult Regular  Diet effective now         09/21/22 1614                   Medications Per DC med rec  Activities as tolerated   Follow-up Information       Christos Christensen MD. Go on 10/6/2022.    Specialty: Orthopedic Surgery  Why: Ortho follow up appt on Thursday  10/6/22 @ 2:30pm.  Contact information:  [address]  [address]  [facility] [address]  [phone]                           For further questions contact hospitalist office.    Discharge time >30 minutes    For worsening symptoms, chest pain, shortness of breath, increased abdominal pain, high grade fever, stroke or stroke like symptoms, immediately go to the nearest Emergency Room or call 911 as soon as possible.      Abdullahi De Paz M.D, on 9/23/2022. at 10:19 AM.

## 2022-09-23 NOTE — HOSPITAL COURSE
The pt was admitted and ortho consulted. She had her fracture repaired on 9/21/22 by Dr. Christensen. Her postop course was uneventful. She will be discharged to rehab today.

## 2022-09-23 NOTE — H&P
[facility] ([facility])  Rehab Admission H&P    Patient Name: Yoan Xavier  MRN: [id number]  Age: 81 y.o. Sex: female  : 1940  Hospital Length of Stay: 0 days  Date of Service: 2022   Chief Complaint:  Left femoral neck fracture 2/2 fall s/p left hip hemiarthroplasty on 2022    Subjective:   History of Present Illness   81-year-old WF presented to [facility] on  complaints of left hip pain after falling doing laundry twelve days prior.  PMH significant for hypertension and hyperlipidemia.  Workup significant for left femoral neck fracture.  On  tolerated left hemiarthroplasty without perioperative complications. Tolerated transfer to [facility] inpatient rehab unit on  without incident.    Sitting on side of bed comfortably.  Reports good sleep and appetite.  Last BM .  Vital signs at goal with no recent recorded fevers.  No labs or imaging today.    Past Medical History: Left femoral neck fracture 2/2 fall, COPD, hypertension, hyperlipidemia, tobacco abuse  Procedure history:  left hip hemiarthroplasty on 2022, Right hip replacement, cyst on tailbone  Family History:  Mother:  at unknown age + hypertension, father:   at unknown age + hypertension  Social History:  Completed high school.  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  Single.  Never .  No children.  Lives alone in assisted living and independent.  Does not have medical alert.  (+) TOB.  Smokes 1 pack of cigarettes daily  (-) ETOH.   (-) Illicit drug use.   Prior level of function:  Independent in ADLs, drives, cooks small meals, does chores, grocery shops, manages finances, manages own medications.  Residence:  Lives alone at [facility] assisted living in [place].  No steps to gain entry.  Has a HHS and grab bars.  Has an elevated toilet with grab bars.  DME: RW  Anticipated discharge destination:  Home with home health    Review of patient's  allergies indicates:  No Known Allergies     Current Facility-Administered Medications:     acetaminophen tablet 500 mg, 500 mg, Oral, Q4H, WILFRID Cotter    amLODIPine tablet 5 mg, 5 mg, Oral, Daily, WILFRID Cotter    aspirin chewable tablet 81 mg, 81 mg, Oral, BID, WILFRID Cotter    atorvastatin tablet 40 mg, 40 mg, Oral, Daily, WILFRID Cotter    benzonatate capsule 100 mg, 100 mg, Oral, TID PRN, WILFRID Cotter    budesonide-formoterol 160-4.5 mcg inhaler 2 puff, 2 puff, Inhalation, BID, WILFRID Cotter    buPROPion TB24 tablet 300 mg, 300 mg, Oral, Daily, WILFRID Cotter    carvediloL tablet 12.5 mg, 12.5 mg, Oral, BID, WILFRID Cotter    docusate sodium capsule 100 mg, 100 mg, Oral, BID, WILFRID Cotter    enoxaparin injection 40 mg, 40 mg, Subcutaneous, Daily, WILFRID Cotter    famotidine tablet 20 mg, 20 mg, Oral, BID, WILFRID Cotter    famotidine tablet 20 mg, 20 mg, Oral, BID PRN, WILFRID Cotter    gabapentin capsule 300 mg, 300 mg, Oral, QHS, WILFRID Cotter    hydrALAZINE injection 10 mg, 10 mg, Intravenous, Q6H PRN, WILFRID Cotter    hydrOXYzine pamoate capsule 50 mg, 50 mg, Oral, Nightly PRN, WILFRID Cotter    labetalol 20 mg/4 mL (5 mg/mL) IV syring, 10 mg, Intravenous, Q4H PRN, WILFRID Cotter    lisinopriL tablet 10 mg, 10 mg, Oral, Daily, WILFRID Cotter    methocarbamoL tablet 750 mg, 750 mg, Oral, QID PRN, WILFRID Cotter    metoprolol injection 10 mg, 10 mg, Intravenous, Q2H PRN, WILFRID Cotter    mirtazapine tablet 30 mg, 30 mg, Oral, QHS, WILFRID Cotter    nitroGLYCERIN SL tablet 0.4 mg, 0.4 mg, Sublingual, Q5 Min PRN, WILFRID Cotter    ondansetron disintegrating tablet 4 mg, 4 mg, Oral, Q6H PRN, WILFRID Cotter    polyethylene glycol packet 17 g, 17 g, Oral, QHS, WILFRID Cotter    traMADoL tablet 50 mg, 50 mg, Oral, Q4H PRN, Olga Herbert,  "FNP     Review of Systems   Complete 12-point review of symptoms negative except for what's mentioned in HPI     Objective:     BP (!) 150/77   Pulse 79   Temp 97.9 °F (36.6 °C)   Resp 18   Ht 5' 8" (1.727 m)   Wt 67.9 kg (149 lb 11.1 oz)   SpO2 (!) 94%   BMI 22.76 kg/m²     No intake or output data in the 24 hours ending 09/23/22 1226    Physical Exam  Vitals reviewed.   Eyes:      Pupils: Pupils are equal, round, and reactive to light.   Cardiovascular:      Rate and Rhythm: Normal rate and regular rhythm.      Heart sounds: Normal heart sounds.   Pulmonary:      Effort: Pulmonary effort is normal.      Breath sounds: Normal breath sounds.   Abdominal:      General: Bowel sounds are normal.   Musculoskeletal:         General: Normal range of motion.      Comments: generalized weakness   Skin:     General: Skin is warm.      Comments: Left incision dry and intact   Neurological:      General: No focal deficit present.      Mental Status: She is alert and oriented to person, place, and time.   Psychiatric:         Mood and Affect: Mood normal.   *MD performed and documented physical examination       Lines/Drains/Airways       Epidural Line  Duration                  Neuraxial Analgesia/Anesthesia Assessment (using dermatomes) Epidural 09/21/22 1236 1 day         Neuraxial Analgesia/Anesthesia Assessment (using dermatomes) Epidural 09/21/22 1236 1 day              Peripheral Intravenous Line  Duration                  Peripheral IV - Single Lumen 09/20/22 1525 18 G Posterior;Right Forearm 2 days                    Labs  Admission on 09/20/2022   Component Date Value Ref Range Status    Sodium Level 09/23/2022 140  136 - 145 mmol/L Final    Potassium Level 09/23/2022 4.4  3.5 - 5.1 mmol/L Final    Chloride 09/23/2022 104  98 - 107 mmol/L Final    Carbon Dioxide 09/23/2022 28  23 - 31 mmol/L Final    Glucose Level 09/23/2022 82  82 - 115 mg/dL Final    Blood Urea Nitrogen 09/23/2022 24.3 (H)  9.8 - 20.1 mg/dL " Final    Creatinine 09/23/2022 0.63  0.55 - 1.02 mg/dL Final    BUN/Creatinine Ratio 09/23/2022 39   Final    Calcium Level Total 09/23/2022 9.2  8.4 - 10.2 mg/dL Final    Anion Gap 09/23/2022 8.0  mEq/L Final    eGFR 09/23/2022 >60  mls/min/1.73/m2 Final    WBC 09/23/2022 13.3 (H)  4.5 - 11.5 x10(3)/mcL Final    RBC 09/23/2022 3.58 (L)  4.20 - 5.40 x10(6)/mcL Final    Hgb 09/23/2022 10.6 (L)  12.0 - 16.0 gm/dL Final    Hct 09/23/2022 32.2 (L)  37.0 - 47.0 % Final    Platelet 09/23/2022 293  130 - 400 x10(3)/mcL Final    MCV 09/23/2022 89.9  80.0 - 94.0 fL Final    MCH 09/23/2022 29.6  27.0 - 31.0 pg Final    MCHC 09/23/2022 32.9 (L)  33.0 - 36.0 mg/dL Final    RDW 09/23/2022 13.8  11.5 - 17.0 % Final    MPV 09/23/2022 9.3  7.4 - 10.4 fL Final    NRBC% 09/23/2022 0.0  % Final       Radiology  Left hip x-ray 9/22:  Good position of left hip prosthesis  Assessment/Plan:     81 y.o. WF admitted on 9/23/2022    Left femoral neck fracture 2/2 fall   - s/p left hip hemiarthroplasty on 09/21/2022  - LLE WBAT  - continue    Aspirin 81 mg b.i.d.     Methocarbamol 750 mg 4 times daily p.r.n. muscle spasms     Gabapentin 300 mg at bedtime    Tylenol 500 mg every 4 hours     Tramadol 50 mg every 4 hours p.r.n.  - follow-up outpatient with orthopedic surgery    GERD  - Avoid spicy foods, and nothing to eat or drink within x2 hours of bedtime or laying flat (water is ok)   - Avoid NSAIDs (Advil, ibuprofen, naproxen...) and resendiz-2 inhibitors (Mobic, Celebrex)    - continue   Pepcid 20 mg b.i.d.    HTN  - at goal!!  - continue   Lisinopril 10 mg daily   Amlodipine 5 mg daily    Coreg 12.5 mg b.i.d.   Hydralazine 10 mg every 2 hours as needed for BP > 160/90   Labetalol 10 mg every 2 hours as needed for BP > 160/90    HLD  - FLP with next labs  - continue   Atorvastatin 40 mg daily    Major depressive disorder  - In remission   - Continue    Wellbutrin 300 mg daily     Constipation  - Stable   - Continue     MiraLax 17 g nightly      Colace 100 mg b.i.d.    Insomnia   - Stable   - Continue    Mirtazapine 30 mg nightly     COPD   - Stable   - Continue    Symbicort 2 puffs daily    Tobacco abuse   - Current  - Smokes 1 pack of cigarettes daily  - Refuses nicotine patch   - Counseled on smoking cessation    Normocytic anemia  - asymptomatic  - H/H stable   - no evidence of active bleeds  - will closely monitor and transfuse if needed     MEDICAL PLAN:  - Admit to [facility]  - Medical reconciliation completed and included in the electronic health record  - Draw admit labs including CBC, CMP, and Prealbumin  - Maintain weightbearing status as ordered  - Monitor postoperative surgical incision changing dressing daily  - Teach skin protection and wound prevention techniques  - Initiate fall precaution  - Initiate bowel training program  - Initiate bladder training as indicated  - Pain management  - IS q2 hours while awake    THERAPEUTIC PLAN:  - Physical therapy 60-90 minutes 5 to week to increase functional mobility, maintain weightbearing precautions, increase lower extremity restrengthening, increase overall activity tolerance, teach balance and safety measures as well as fall precautions, make equipment and orthotic recommendations, be involved in family training and discharge planning, monitor pain levels and vital signs during therapy adjusting treatment accordingly  - Occupational therapy 60-90 minutes 5 times a week to increase functional independence with activities of daily living, maintain weightbearing precautions, teach use of adaptive equipment, increase upper extremity restrengthening, increase overall activity tolerance, teach balance and safety measures as well as fall precautions, make equipment and orthotic recommendations, be involved in family training and discharge planning, monitor pain levels and vital signs during therapy adjusting treatment accordingly  - Speech and language  pathology will do an in-depth evaluation of patient's cognition, phonation, and swallowing. They will initiate therapy 30- 60 minutes 5 times a week as indicated  - Recreational therapy will incorporate all patient's functional abilities  into recreational activities that will require visual, spatial, and perceptual abilities; gross and fine motor coordination skills; the cognition to follow multistep commands; dynamic and static balance and reaching abilities. They will also incorporate animal assisted therapy into their weekly program  - Rehabilitation nursing will act as patient family physician liaison. They will integrate patient's functional abilities, after discussions with therapist, into everyday activities with the CNA's,  LPN's and RNs that will include but are not limited to dressing, bathing, feeding, grooming, toileting, transfers, hygiene etc. They will administer medications watching for wanted as well as unwanted effects. They will initiate DVT/VTE prophylaxis as ordered. Will monitor vital signs, lab values, and pain levels, making appropriate physician notification. They will monitor skin integrity including postop incision, making daily dressing changes. They will teach skin protection and wound prevention techniques. They will initiate bowel training They will initiate bladder training as indicated. They will initiate fall precautions  - Rehabilitation counseling/case management will act as patient and family liaison. They will set up family conferences, family training, aid in discharge planning, and aid in the procurement of assistive devices  - Patient will have 24 hour availability to physiatric care  - Patient will have nutritional services involved, monitoring oral input and visceral protein stores. They will make dietary and supplement recommendations and follow-up as necessary  - Patient will have weekly interdisciplinary team conferences  - Patient will have initial family conference  and follow up conferences as indicated  - Patient will have family training prior to discharge     Estimated length of stay - 14-17 days  Anticipated discharge destination - Home with   Medical status - stabilizing postoperatively; will need to monitor pain levels, postoperative skin integrity, watch for evidence of deep vein thrombosis, monitor postoperative H&H, monitor vital signs closely.  Medical prognosis - Good for post-op healing and functional improvement  Previous functional Status: Independent in ADLs, drives, cooks small meals, does chores, grocery shops, manages finances, manages own medications.  Present Functional Status:  Minimal to moderate assist    VTE Prophylaxis:  Lovenox 40 mg daily  COVID-19 testing: Negative 9/23/2022  COVID-19 vaccination status:  Unvaccinated    POA: no  Living will: no  Contacts: Namrata Yu (sister) [phone]     Lex Yu (brother-in-law) [phone]    CODE STATUS: Full  Internal Medicine (attending): Vasiliy Connell MD  Physiatry (consulting):  Vernon Manrique MD    OUTPATIENT PROVIDERS  PCP: Ronal Zuluaga MD  Orthopedic surgery:  Christos Christensen MD    DISPOSITION: Condition stable. Tolerated transfer.  Recent labs and imaging reviewed.  Rehab admission orders initiated.  Med reconciliation completed.  Consult physiatry for rehab and pain management.  PT/OT/RT/ST to eval and treat.  Sleep hygiene, bowel management, and appetite at goal.    PHYSICIAN ATTESTATION: I have been involved in the patient's rehabilitation prescreening process and I have now completed the post admission physician evaluation. Patient is in need of, appropriate for, and should tolerate the above outlined inpatient rehabilitation plan. I believe this is necessary to reach maximal postoperative healing and maximal functional abilities. I do not believe this would be possible in a timely fashion in a less intensive setting      Olga Herbert NP conducted independent physical examination  and assisted with medical documentation.    Total time spent on this encounter including chart review and direct MD + NP 1-on-1 patient interaction: 109 minutes   Over 50% of this time was spent in counseling and coordination of care

## 2022-09-23 NOTE — PLAN OF CARE
Problem: Fall Injury Risk  Goal: Absence of Fall and Fall-Related Injury  Outcome: Ongoing, Progressing  Intervention: Promote Injury-Free Environment  Flowsheets (Taken 9/23/2022 0149)  Safety Promotion/Fall Prevention:   assistive device/personal item within reach   Fall Risk signage in place   lighting adjusted   nonskid shoes/socks when out of bed   side rails raised x 3   instructed to call staff for mobility     Problem: Pain Acute  Goal: Acceptable Pain Control and Functional Ability  Outcome: Ongoing, Progressing  Intervention: Prevent or Manage Pain  Flowsheets (Taken 9/23/2022 0149)  Sleep/Rest Enhancement:   awakenings minimized   regular sleep/rest pattern promoted  Medication Review/Management: medications reviewed

## 2022-09-23 NOTE — PT/OT/SLP PROGRESS
"Physical Therapy Treatment    Patient Name:  Yoan Xavier   MRN:  [id number]    Recommendations:     Discharge Recommendations:  rehabilitation facility   Discharge Equipment Recommendations: walker, rolling   Barriers to discharge: Decreased caregiver support    Assessment:     Yoan Xavier is a 81 y.o. female admitted with a medical diagnosis of Closed fracture of left hip.  She presents with the following impairments/functional limitations:  weakness, impaired endurance, impaired functional mobility, gait instability, decreased lower extremity function, pain, decreased ROM, orthopedic precautions .    Rehab Prognosis: Good; patient would benefit from acute skilled PT services to address these deficits and reach maximum level of function.    Recent Surgery: Procedure(s) (LRB):  HEMIARTHROPLASTY, HIP, POSTERIOR APPROACH (Left) 2 Days Post-Op    Plan:     During this hospitalization, patient to be seen BID to address the identified rehab impairments via gait training, therapeutic activities, therapeutic exercises and progress toward the following goals:    Plan of Care Expires:  09/27/22    Subjective     Chief Complaint: no complaint expressed at this time  Patient/Family Comments/goals: "To go to rehab today"  Pain/Comfort:  Location - Side 1: Left  Location 1: hip  Pain Addressed 1: Pre-medicate for activity, Cessation of Activity, Reposition      Objective:     Communicated with CHIQUITA Hernández prior to session.  Patient found up in chair with   call bell upon PT entry to room.     General Precautions: Standard, fall   Orthopedic Precautions:LLE weight bearing as tolerated   Braces: N/A  Respiratory Status: Room air     Functional Mobility:  Transfers:     Sit to Stand:  contact guard assistance with rolling walker  Gait: 30ft with RW, CGA, wide THERESA, RLE externally rotated.       Therapeutic Activities and Exercises:   SANDRA HEP x10 dano    Patient left up in chair with call button in reach and NSG " notified..    GOALS:   Multidisciplinary Problems       Physical Therapy Goals          Problem: Physical Therapy    Goal Priority Disciplines Outcome Goal Variances Interventions   Physical Therapy Goal     PT, PT/OT Adequate for Care Transition     Description: Pt will improve functional independence by performing:    Bed mobility: SBA  Sit to stand: SBA   Ambulation x 150' feet with Min A  and rolling walker  1 Step (Curb): Min A  and rolling walker  3 Steps: Min A  and B HR  Independent with total knee HEP                           Time Tracking:     PT Received On:    PT Start Time: 1101     PT Stop Time: 1119  PT Total Time (min): 18 min     Billable Minutes: Therapeutic Activity 18 min    Pt seen for PT Last Visit session. Answered all last questions/concerns as appropriate. Pt is ready for DC to rehab later today. This note to serve as DC Summary Note as well.     Treatment Type: Treatment  PT/PTA: PT     PTA Visit Number: 1 09/23/2022

## 2022-09-23 NOTE — HPI
Yoan Xavier is a 81 y.o. female who  has a past medical history of Hypertension and Mixed hyperlipidemia.. The patient presented to [facility] on 9/20/2022 with a primary complaint of hip pain.  Pt tripped in home 12 days ago, she felt left hip pain but was able to walk around and do her daily activities using her walker.  She seen her pcp in [place] about a week ago and xray showed hip fracture but she decided to see if it would heal on its own.  Pain persisted and increased and she presented to ED 9/20/22.

## 2022-09-23 NOTE — DISCHARGE INSTRUCTIONS
[facility]  [address]  [place] [address]  Phone [phone]       /      Fax [phone]  SURGEON: Dr. Christensen    After discharge, all questions or concerns should be handled at your surgeon's office ([phone]). If it is a weekend or after hours, you will get the surgeon on call.     PAIN MANAGEMENT: Next Dose Available   Tylenol/Acetaminophen 500mg- every 4 hours, around the clock (WHILE AWAKE) 4:15pm   Ultram/Tramadol 50mg (Pain Med) - every 4-6 hours AS NEEDED for pain 4:15pm                   COMPLICATION PREVENTION MEDS: Next Dose DUE   Aspirin 81mg twice a day for 6 weeks post-op for blood clot prevention PM on 9/23/22   MiraLAX 17gm - once or twice a day while on narcotics and muscle relaxers for constipation prevention PM on 9/23/22                                        Partial Hip Replacement      PAIN MEDICATIONS/PAIN MANAGEMENT:        Tylenol/Acetaminophen 500mg every 4 hours, around the clock (WHILE AWAKE). Take Ultram (Tramadol) 50mg (pain pill) every 4-6 hours as needed for pain.    **NO MORE THAN 3000mg OF TYLENOL IN 24 HOURS**.     Robaxin/Methocarbamol 750mg (muscle relaxer)- you can take every 6-8 hours as needed for muscle spasms, thigh pain and stiffness, additional pain control or breakthrough pain medications. This medication is helpful for pain control while lessening your need for narcotics. Please reduce the use gradually as the pain and spasms lessen. DO NOT TAKE AT THE SAME TIME AS A PAIN PILL. YOU WILL BE BETTER SERVED WITH 2 HOURS BETWEEN PAIN PILL AND MUSCLE RELAXER.           Use the medication log in your discharge packet to keep track of your medications.      **Other things that help with pain control is WALKING, COMPRESSION WRAP, ICE and ELEVATION!!**      BLOOD CLOT PREVENTION:   Aspirin 81 mg twice a day for 6 weeks postop. Start on 9/23/22. Stop on 11/2/22.  If you were taking a baby aspirin prior to surgery, okay to restart  after 6 weeks - 11/3/22.  You need to continuing wearing your compression stocking (RFAAEL Hose - ThromboEmbolic Disease Prevention Device) for the next 2-6 weeks post-op. It is ok to remove them for hygiene and at bedtime.   Hand wash and Dry. **If the swelling persists in the legs after you stop wearing the Rafael hose, continue to wear them until the swelling decreases.**  REMOVE STOCKINGS AT LEAST DAILY FOR SKIN ASSESSMENT.   Do NOT let the stockings roll down, creating a tourniquet around the back of your knee. If you need to, leave the excess at the bottom of the stocking.   The best thing you can do to prevent blood clots is to walk around as much as possible, AT LEAST EVERY 1-2 HOURS.       CONSTIPATION PREVENTION:   Miralax or Senokot S/Amira-Colace and Stool softeners EVERY DAY while on pain meds.  Use other more aggressive over the counter LAXATIVES as needed for constipation (Examples: Milk of Magnesia, Dulcolax tabs or suppository, Magnesium Citrate, Fleet's Enema...etc.)   Drink lots of water.  Increase Fiber in diet.  Increase walking distance each day  DO NOT GO TOO LONG WITHOUT HAVING A BOWEL MOVEMENT!      ACTIVITY:  You will be FULL weight-bearing on your operative leg.  Please do not take yourself off of the walker too soon.  Please allow your outpatient therapist or surgeon to guide you.  You will be range of motion as tolerated to your operative knee.  Work on bending and straightening the knee and change positions often throughout the day.  Do not put anything behind the knee, keeping it in a bent position.  Elevate your affected extremity way above the level of the heart to reduce swelling 2 to 3 times a day, 20-30 minutes at a time.  Walk around at least every 1-2 hours while awake.  No heavy lifting, pulling, pushing or straining.      THERAPY  After discharged from the facility start home health physical therapy at least 3 times per week.    WOUND CARE:   Dry dressing changes every other day and  as needed for soiling for 14 days. Start Sunday  DO NOT WET WOUND or apply any ointments, creams, lotions or antiseptics.  Ace wrap - apply your compression stocking and apply the ace wrap where the stocking stops for extra added compression to the knee.   May wet incision after you follow-up with your surgeon.   Ok to shower before then if able to keep wound from getting wet (plastic barrier, saran wrap or cling wrap and tape).   DO NOT TOUCH INCISION  Apply Ice to the Knee and thigh as much as possible.      URINARY RETENTION:  If you start having difficulty urinating, decrease the use of Pain pills and muscle relaxers and notify your primary care doctor.     PNEUMONIA PREVENTION:  Stay out of bed as much as possible and walk around every 1-2 hours.  Continue breathing exercises (Incentive Spirometry) every 1-2 hours while mobility is limited and while you are on pain pills.    INFECTION PREVENTION:  Proper handwashing before and after dressing changes. Do not wet the wound. Wound care instructions as written above. NOTIFY MD OF EXCESSIVE WOUND DRAINAGE.  No alcohol, smoking or tobacco products  Pets should not be allowed around the wound or the dressing.   Treat UTI and skin infections as soon as possible.  Pre-medicate with antibiotics prior to dental or surgical procedures.   If you are diabetic, MAINTAIN GOOD BLOOD SUGAR CONTROL (Below 150) DURING YOUR RECOVERY. If you see high numbers, notify your primary care doctor.     Call your SURGEON'S OFFICE ([phone]) if you experience the following signs and symptoms of infection:   Unusual redness, swelling, excessive, cloudy or foul smelling drainage at the incision site.   Persistent low grade temp OR a temp greater than 102 F, unrelieved by Tylenol  Pain at surgical site, unrelieved by pain meds    Warning signs of a blood clot in your leg: (CALL YOUR SURGEON)  New onset or increasing pain in calf, new onset tenderness or redness above or below the knee or  increasing swelling of your calf, ankle, or foot.  Warning signs that a blood clot has traveled to your lungs: (REPORT TO THE ER/CALL 911)  Sudden or increase in Shortness of breath, sudden onset of chest pains, or  Localized chest pain with coughing.         IF ANY ISSUES ARISE AND YOU FEEL THE NEED TO CALL YOUR PRIMARY CARE DOCTOR, PLEASE LET YOUR SURGEON KNOW AS WELL.     For emergencies, please report to OUR ([facility] [place]) Emergency department and tell them to call YOUR SURGEON at [phone].     BEFORE MAKING ANY CHANGES TO THE MEDICAL CARE PLAN OR GOING TO THE EMERGENCY ROOM, PLEASE CONTACT THE SURGEON.    3rd floor nursing unit # [phone]  Use this number for questions about your discharge instructions or problems filling your discharge prescriptions.

## 2022-09-23 NOTE — CONSULTS
[facility] Inpatient Services  Inpatient Rehab Prescreen    PATIENT INFORMATION     Assessment date/time: 09/23/2022 10:15 AM    Name: Yoan Xavier Phone: [phone]   Address:   [address] [address] SSN:    YOB: 1940 Age: 81 y.o. Gender: female   Race: White   Marital Status: Single   Advance Directives: Full code     COVERAGE INFORMATION     Patient Medicare #:  [id number]    Primary Insurance Type:  / Medicare Part A&B Secondary Insurance Type:  / Blue Cross Blue Shield   Policy #:  [id number] Policy #:  [id number]   Insurance contact name/number:  Insurance contact name/number:    Authorization #:  Authorization #:    Verified Coverage: Insurance Carrier   Pending Coverage:    Prescription Coverage:  Insurance details/comments:      PHYSICIAN/REFERRAL INFORMATION     Primary Care Physician: Ronal Zuluaga MD Attending Physician: Vasiliy Connell MD   Consulting physician/specialist:  Referring Physician:    Referring facility: [facility] Referring contact name/phone:    Physician details/comments:      CONTACT INFORMATION   Extended Emergency Contact Information  Primary Emergency Contact: Namrata Yu  Mobile Phone: [phone]  Relation: Sister  Preferred language: English   needed? No    PRIOR LIVING SITUATION         Bedroom location/setup: 1st floor   Bathroom location/setup: 1st floor   Equipment at home: RW   Prior device use:  RW about a 1 week or so before surgery     PRIOR LEVEL OF FUNCTION     Did a helper need to assist with the following activities prior to the current illness, exacerbation, or injury?   Self-care:  No, independent   Indoor mobility:  Used a RW for mobility, modified independent   Stairs: No, independent   Functional Cognition: No, independent   Comments:    Caregivers providing assistance:   Pre-hospital home care service:  Name of Agency:    Home/personal responsibilities: pt. Lives in assisted  living but was completed independent.  She was still driving   Pre-hospital vocational category: Retired for age   Pre-hospital vocational effort: Retired for age   Occupation/profession:   Retired from clerical work for [employer] Return to work/school plan:    Educational history:High school    Hobbies/leisure activities:  Resources used prior to admission:    Available resources:  Resource information comments:      REHABILITATION DIAGNOSIS     History of present illness: Yoan Xavier is a 81 y.o. female who  has a past medical history of Hypertension and Mixed hyperlipidemia.. The patient [facility] to [facility] with a primary complaint of hip pain.  Pt tripped in home 12 days ago, she felt left hip pain but was able to walk around and do her daily activities using her walker.  She seen her pcp in [place] about a week ago and xray showed hip fracture but she decided to see if it would heal on its own.  Pain persisted and increased and she presented to ED.  Pt. Was diagnosed with a left femoral neck fracture.  She had left hip hemiarthroplasty 9/21/2022. Pt. Is participating with therapy.  She will benefit from an inpatient rehab stay.  This will help to increase patient's functional independence with ADLs and mobility prior to return home.    Pt. Requires acute inpatient rehab admission with 24-hour nursing and active physician oversight to monitor and manage acute medical comorbid conditions, labs, pain, and functional deficits.  Patient/family will also require teaching and integration of improving functional skills into daily living.  She will also require individualized, interdisciplinary approach to her care, receiving PT,OT services 3 hours per day, 5 days per week.    Required care cannot be provided at lower level of care. Patient is anticipated to require approximately 10-12 days LOS with expected discharge home with HH.      Impairment group (IGC):   Unilateral Hip Fracture 08.11  Etiologic diagnosis/description: left femoral neck fracture   Date of onset:  09/20/2022 Date of surgery: 09/21/2022 left hip hemiarthroplasty   Allergies: Patient has no known allergies.   Comorbid condition: Hypertension   Medical/functional conditions requiring inpatient rehabilitation: This patient requires medical management/24-hour nursing?of complex    comorbidities ( ), labs,medications (see medications list), pain, incision care,sleep    hygiene, anticoagulation, nutrition, hydration, neurological,   pulmonary, and cardiac status, and preventive healthcare.        This patient requires intense therapy and an integrated,    interdisciplinary approach to address safety, impaired mobility,    impaired ADLs (dressing, toileting, grooming, showering),   pulmonary insufficiency,  preventive healthcare, medication management, integration of  improving functional skills into daily living, and home caregiver    support and training.    Risk for medical/clinical complications: This patient is at risk for the following complications: DVT/PE, pneumonia,  malnutrition, neurological decline, respiratory insufficiency,  worsening activity intolerance, complications from anticoagulation,   Incision infection, skin breakdown, inadequate sleep, and constipation.           SPECIAL REHABILITATION NEEDS     IV: 18 gauge right posterior forearm and Requires O2: 2 liters weaning Preferred Language: English        Peripheral IV - Single Lumen 09/20/22 1525 18 G Posterior;Right Forearm (Active)   Site Assessment Clean;Dry;Intact 09/23/22 0400   Extremity Assessment Distal to IV No abnormal discoloration 09/22/22 0800   Line Status Infusing 09/22/22 1600   Dressing Status Clean;Dry;Intact 09/22/22 1600   Dressing Intervention Integrity maintained 09/22/22 1600          PRECAUTIONS     Hip precautions, Safety/fall precautions: High fall risk, and Weight-bearing status: Weight-bearing as tolerated: LLE     PAST MEDICAL, SOCIAL,  "FAMILY HISTORY     Pertinent past medical history:   Past Medical History:   Diagnosis Date    COPD (chronic obstructive pulmonary disease)     Depression     Hypertension     Insomnia     Lumbar degenerative disc disease     Mixed hyperlipidemia       Has the patient had two or more falls in the past year or any fall with injury in the past year: YES   Has the patient had major surgery during the 100 days prior to admission: YES   Family Medical History:   Family History   Problem Relation Age of Onset    Hypertension Mother     Hypertension Father       Substance use history:   Social History     Substance and Sexual Activity   Alcohol Use Yes    Comment: 3 glass wine a year     Social History     Tobacco Use   Smoking Status Every Day    Packs/day: 1.00    Types: Cigarettes   Smokeless Tobacco Never     Social History     Substance and Sexual Activity   Drug Use Never      VITALS   BP:  121/61     Temp: 98.2 °F (36.8 °C)     HR: 78     Resp: 16     SpO2: (!) 92 %     Height: 5' 8" (1.727 m)     Weight: 68 kg (150 lb)     BMI: 22.8          MED/LABS/DIAGNOSITICS   No current facility-administered medications for this encounter.     No current outpatient medications on file.     Facility-Administered Medications Ordered in Other Encounters   Medication Dose Route Frequency Provider Last Rate Last Admin    0.9%  NaCl infusion   Intravenous Continuous Christos Christensen  mL/hr at 09/21/22 2037 New Bag at 09/21/22 2037    acetaminophen tablet 500 mg  500 mg Oral Q6H PRN Christos Christensen MD        acetaminophen tablet 500 mg  500 mg Oral Q4H Justus Bob MD        aluminum-magnesium hydroxide-simethicone 200-200-20 mg/5 mL suspension 30 mL  30 mL Oral Q6H PRN Christos Christensen MD        amLODIPine tablet 5 mg  5 mg Oral Daily Christos Christensen MD   5 mg at 09/23/22 0746    aspirin chewable tablet 81 mg  81 mg Oral BID Christos Christensen MD   81 mg at 09/23/22 0746    atorvastatin tablet 40 mg  40 mg Oral Daily " Christos Christensen MD   40 mg at 09/22/22 0807    buPROPion TB24 tablet 300 mg  300 mg Oral Daily Abdullahi Carpio MD        calcium carbonate 200 mg calcium (500 mg) chewable tablet 500 mg  500 mg Oral TID PRN Christos Christensen MD        carvediloL tablet 12.5 mg  12.5 mg Oral BID Abdullahi Carpio MD        docusate sodium capsule 200 mg  200 mg Oral Daily Christos Christensen MD   200 mg at 09/23/22 0746    famotidine tablet 20 mg  20 mg Oral BID Christos Christensen MD   20 mg at 09/23/22 0746    gabapentin capsule 300 mg  300 mg Oral QHS Christos Christensen MD   300 mg at 09/22/22 2009    hydrALAZINE injection 10 mg  10 mg Intravenous Q4H PRN Christos Christensen MD        hydrOXYzine HCL tablet 25 mg  25 mg Oral QID PRN Abdullahi Carpio MD   25 mg at 09/22/22 1824    labetalol 20 mg/4 mL (5 mg/mL) IV syring  10 mg Intravenous Q4H PRN Christos Christensen MD        lactulose 20 gram/30 mL solution Soln 20 g  20 g Oral Q6H PRN Christos Christensen MD        lisinopriL tablet 10 mg  10 mg Oral Daily Abdullahi Carpio MD        melatonin tablet 6 mg  6 mg Oral Nightly PRN Christos Christensen MD        methocarbamoL tablet 750 mg  750 mg Oral Q8H PRN Christos Christensen MD        mirtazapine tablet 30 mg  30 mg Oral QHS Abdullahi Carpio MD        mupirocin 2 % ointment   Nasal BID Christos Christensen MD   Given at 09/22/22 2100    ondansetron injection 4 mg  4 mg Intravenous Daily PRN Echo Lakhani MD        ondansetron injection 4 mg  4 mg Intravenous Q6H PRN Christos Christensen MD        polyethylene glycol packet 17 g  17 g Oral QHS Christos Christensen MD   17 g at 09/22/22 2100    prochlorperazine injection Soln 5 mg  5 mg Intravenous Q30 Min PRN Echo Lakhani MD        senna-docusate 8.6-50 mg per tablet 2 tablet  2 tablet Oral BID Christos Christensen MD   2 tablet at 09/23/22 0745    sodium chloride 0.9% flush 10 mL  10 mL Intravenous PRN Christos Christensen MD        traMADoL tablet 50 mg  50 mg Oral Q4H PRN Justus Bob MD          Pertinent lab results:    Lab Results   Component Value Date    WBC 13.3 (H) 09/23/2022    HGB 10.6 (L) 09/23/2022    HCT 32.2 (L) 09/23/2022     09/23/2022     09/23/2022    K 4.4 09/23/2022    BUN 24.3 (H) 09/23/2022    CO2 28 09/23/2022      Pertinent diagnostic studies:    Additional labs and diagnostic studies required prior to admission:      QI: GG Care Tool     QI: GG Care Tool  Therapy Evaluation   Swallowing/  Nutritional Status   Diet/Feeding/  Swallowing    Eating       Oral Hygiene   Grooming Supervision standing with RW while washing face and brushing teeth   Toileting Hygiene       Shower/Bathe   Bathing    Upper Body Dressing   Dressing Upper    Lower Body Dressing   Dressing Lower Max assist   Putting On/Taking Off Footwear       Toilet Transfer   Toileting Min assist    Bladder Continence Status   Bladder     Bowel Continence Status   Bowel     Roll Left and Right   Bed Mobility Sit to supine min assistance   Sit to Lying       Lying to Sitting on Side of Bed       Sit to Stand       Chair/Bed-to- Chair   Transfers   Sit to stand/stand to sit min assist RW  Toilet transfer step transfer technique with min assist with grab bars  Bed to chair transfer contact guard and vc for safety RW   Car Transfer       Walk 10 Feet   Equipment RW     Walk 50 Feet with Two Turns   Balance    Walk 150 Feet   Endurance    Walk 10 Feet on Uneven Surfaces   Gait Contact guard RW 10 ft to restroom then 20 ft to sit EOB    Picking up an Object       Wheel 50 Feet with Two Turns   Wheelchair    Wheel 150 Feet       1 Step (Curb)   Stairs    4 Steps       12 Steps       Expression of Ideas and Wants   Communication    Understanding  Verbal and Non-Verbal Content       Cognitive Patterns   Cognition Oriented to person and year could not verbalize month or location      Safety Precautions       Lower Extremity      Strength RLE WFL, LLE not tested due to surgical site      ROM RLE WFL, LLE limited 2/2 post op precautions      Upper  Extremity      Strength WFL      ROM WFL     Care Score Value Definitions  6: Independent. [place] provides no assistance with tasks. A device may or may not have been used.  5: Set-up or clean-up assistance. [place] sets up or cleans up, but does not assist with tasks. [place] may have assisted prior to or following the activity.  4: Supervision or touching assistance. [place] provides verbal cues or touching/steadying or contact guard assistance. Assistance may be provided throughout the activity or intermittently.  3: Partial/moderate assistance. [place] does less than half the effort. [place] lifts, holds, or supports trunk or limbs, but provides less than half the effort.  2: Substantial/maximal assistance. [place] does more than half the effort. [place] lifts or holds trunk or limbs, and provides more than half the effort.  1: Dependent. [place] does all of the effort, or the assistance of two or more helpers is required for the patient to complete the activity.  Care Score Activity Not Attempted Value Definitions  7: Patient refused.  9: Not applicable. Not attempted and the patient did not perform this activity prior to the current illness, exacerbation, or injury.  10: Not attempted due to environmental limitations (e.g., lack of equipment, weather constraints).  88: Not attempted due to medical condition or safety concerns.    DISCHARGE GOALS/ANTICPATED INTERVENTIONS/SERVICES     Expected Level of Improvement for Safe Discharge: Anticipate discharge home at or near baseline level of functioning and/or decreased burden of care.      Patient/Family/Caregiver Goals: To get back to assisted living   Required Treatments/Services: Rehabilitation Nursing, Dietitian/nutrition, Social , and Case Management   Required Therapy Therapy Type Min/Day Days/Week Duration of Therapy   Physical Therapy 90 5 10-12 days   Occupational Therapy 90 5 10-12 days   Speech and Language Pathology      Prosthetic/Orthotics      Recreational  Therapy      Anticipated Services upon Discharge:  Home Harry PT/OT   Additional rehabilitation needs:  N/A   Expected Discharge Destination:  Home at assisted living with HH   Barriers to Discharge: None   Discharge Support:  pt. Lives in assisted living and plans to return there   Patient/Family/Caregiver Orientation: Patient/family/caregiver oriented and agreeable to inpatient rehabilitation plan   Estimated Length of Stay: 10-12 days   Projected Admission Date:09/23/2022   Medical Prognosis: good   Physicians Review and Admission Determination: I have discussed patient with Nurse Liaison. I have reviewed the prescreen. Patient is in need of, appropriate for and should tolerate and benefit from an aggressive IRF stay.

## 2022-09-23 NOTE — PT/OT/SLP EVAL
Occupational Therapy Inpatient Rehab Evaluation    Name: Yoan Xavier  MRN: [id number]    Recommendations:     Discharge Recommendations:      Discharge Equipment Recommendations:     Barriers to discharge: None    Assessment:  Yoan Xavier is a 81 y.o. female admitted with a medical diagnosis of Closed displaced midcervical fracture of left femur.  She presents with the following impairments/functional limitations:  weakness, impaired endurance, impaired functional mobility, gait instability, decreased lower extremity function, pain, decreased ROM, orthopedic precautions .    General Precautions: Standard, fall     Orthopedic Precautions:LLE weight bearing as tolerated     Braces: N/A    Rehab Prognosis: Good; patient would benefit from acute skilled OT services to address these deficits and reach maximum level of function.      History:     Past Medical History:   Diagnosis Date    COPD (chronic obstructive pulmonary disease)     Depression     Hypertension     Insomnia     Lumbar degenerative disc disease     Mixed hyperlipidemia        Past Surgical History:   Procedure Laterality Date    cyst on tailbone      HEMIARTHROPLASTY, HIP, POSTERIOR APPROACH Left 9/21/2022    Procedure: HEMIARTHROPLASTY, HIP, POSTERIOR APPROACH;  Surgeon: Christos Christensen MD;  Location: [facility];  Service: Orthopedics;  Laterality: Left;    right hip replacement         Subjective     Orientation: Oriented x4    Chief Complaint: lower body weakness    Patient/Family Comments/goals: to return to PLOF    Vitals  Vitals at Rest  BP (!) 149/74     HR     O2 Sat     Pain Pain Rating 1: 1/10  Location - Side 1: Left  Location 1: hip  Pain Addressed 1: Pre-medicate for activity  Pain Rating Post-Intervention 1: 1/10     Vitals With Activity  BP (!) 149/74     HR     O2 Sat     Pain Pain Rating 1: 1/10  Location - Side 1: Left  Location 1: hip  Pain Addressed 1: Pre-medicate for activity  Pain Rating Post-Intervention 1: 1/10      Respiratory Status: Room air    Patients cultural, spiritual, Cheondoism conflicts given the current situation: no       Living Environment   Living Environment  Lives With: alone, facility resident  Equipment Currently Used at Home: walker, rolling  Shower Setup: Tub/Shower combo    Prior Level of Function  BADL: Supervision or Set-up Assistance    IADL: Modified Independent    Equipment used at home: walker, rolling.  DME owned (not currently used): rolling walker.      Upon discharge, patient will have assistance from living facility .    Objective:     Patient found supine with peripheral IV  upon OT entry to room.    Mobility   Patient completed:  Supine to Sit with contact guard assistance  Sit to Stand Transfer with contact guard assistance with rolling walker  Toilet Transfer Step Transfer technique with supervision with  rolling walker  Tub Transfer Step Transfer technique with minimum assistance with tub bench    Functional Mobility   Patient able to ambulate in room with RW and CGA    ADLs     Current Status   Eating 6   Oral Hygiene 3   Shower, Bathe Self 3   Upper Body Dressing 4   Lower Body Dressing 2   Toileting Hygiene 4   Toilet Transfer 4   Putting On, Taking Off Footwear 2     Limiting Factors for ADLs: motor, endurance, limited ROM, balance, and weakness      Exams     ROM:          -       WFL    Hand Dominance: Right    ROM Hand  Left Hand: WFL  Right Hand: WFL    Strength  Overall Strength:          -       WFL     Strength:   Left   Left  Strength Trial 1 Trial 2 Trail 3    Strength          Right   Right  Strength Trial 1 Trial 2 Trail 3    Strength          Pinch Strength:   Left    Trial 1 Trail 2 [place] 3   Key Pinch      [place]mar Pinch          Right    Trial 1 Trail 2 [place] 3   Key Pinch      Palmar Pinch          Sensation  Left:          -       WNL  Right:          -       WNL    Coordination:      -       Intact    Tone  Left: WNL  Right:  WNL    Visual/Perceptual  Patient wears glasses to read    Cognition:   WFL    Balance    Sitting  Sitting Surface: bed  Static: No UE Support, Fair  Dynamic: No UE extremity support, Fair (-)    Standing  Static: Bilateral UE Extremity Support, Fair  Dynamic: Bilateral UE extremity support, Poor (+)    Righting Reaction:   WNL    Posture/Deviations  Kyphotic posture, head and shoulders forward    Additional Treatments   Patient chart reviewed and ot eval completed; additional training for  ADL transfers.     LifeStyle Change and Education     Patient left supine with all lines intact and call button in reach.    Education provided: Roles and goals of OT, ADLs, transfer training, bed mobility, body mechanics, assistive device, fall prevention, and post-op precautions    ADLs:  STG:Pt to perform grooming tasks with SUP and standing at sink  LTG:Pt to perform grooming tasks with MI and standing at sink      LTG: Pt to perform UB dressing with MI and seated    STG:Pt to perform LB dressing with min A and AE  LTG:Pt to perform LB dressing with MI and AE     STG: Pt to perform putting on/off footwear task with min A and AE  LTG: Pt to perform putting on/off footwear task with MI and AE    STG: Pt to perform toileting with set up using BSC frame  LTG: Pt to perform toileting with MI using BSC frame    Functional Transfers:  STG: Pt to perform toilet transfers with SUP and RW  LTG: Pt to perform toilet transfers with MI  and RW    STG: Pt to perform a tub transfer with SUP and tub bench  LTG: Pt to perform a tub transfer with MI and tub bench  Multidisciplinary Problems       Occupational Therapy Goals          Problem: Occupational Therapy    Goal Priority Disciplines Outcome Interventions   Occupational Therapy Goal     OT, PT/OT                         Plan     During this hospitalization, patient to be seen   to address the identified rehab impairments via self-care/home management, therapeutic activities, therapeutic  exercises and progress toward the following goals:    Plan of Care Expires:       Time Tracking     OT Received On: 09/23/22  Time In 1405     Time Out 1435  Total Time 30 min  Therapy Time: OT Individual: 30  Missed Time:    Missed Time Reason:      Billable Minutes: Evaluation 15 and Self Care/Home Management 15    09/23/2022

## 2022-09-23 NOTE — PLAN OF CARE
Recvd notification from [facility] ( [facility] rehab)- pt accepted. Transfer today. No covid test needed.   Spk w pt -- verb under & agree.     Handoff communication to Betty moran.

## 2022-09-24 LAB
ALBUMIN SERPL-MCNC: 2.5 GM/DL (ref 3.4–4.8)
ALBUMIN/GLOB SERPL: 0.7 RATIO (ref 1.1–2)
ALP SERPL-CCNC: 54 UNIT/L (ref 40–150)
ALT SERPL-CCNC: 7 UNIT/L (ref 0–55)
AST SERPL-CCNC: 16 UNIT/L (ref 5–34)
BASOPHILS # BLD AUTO: 0.04 X10(3)/MCL (ref 0–0.2)
BASOPHILS NFR BLD AUTO: 0.4 %
BILIRUBIN DIRECT+TOT PNL SERPL-MCNC: 0.6 MG/DL
BUN SERPL-MCNC: 15.6 MG/DL (ref 9.8–20.1)
CALCIUM SERPL-MCNC: 9.4 MG/DL (ref 8.4–10.2)
CHLORIDE SERPL-SCNC: 104 MMOL/L (ref 98–107)
CHOLEST SERPL-MCNC: 100 MG/DL
CHOLEST/HDLC SERPL: 4 {RATIO} (ref 0–5)
CO2 SERPL-SCNC: 29 MMOL/L (ref 23–31)
CREAT SERPL-MCNC: 0.58 MG/DL (ref 0.55–1.02)
EOSINOPHIL # BLD AUTO: 0.15 X10(3)/MCL (ref 0–0.9)
EOSINOPHIL NFR BLD AUTO: 1.5 %
ERYTHROCYTE [DISTWIDTH] IN BLOOD BY AUTOMATED COUNT: 13.8 % (ref 11.5–17)
FERRITIN SERPL-MCNC: 596.23 NG/ML (ref 4.63–204)
GFR SERPLBLD CREATININE-BSD FMLA CKD-EPI: >60 MLS/MIN/1.73/M2
GLOBULIN SER-MCNC: 3.4 GM/DL (ref 2.4–3.5)
GLUCOSE SERPL-MCNC: 80 MG/DL (ref 82–115)
HCT VFR BLD AUTO: 32.9 % (ref 37–47)
HDLC SERPL-MCNC: 25 MG/DL (ref 35–60)
HGB BLD-MCNC: 10.7 GM/DL (ref 12–16)
IMM GRANULOCYTES # BLD AUTO: 0.04 X10(3)/MCL (ref 0–0.04)
IMM GRANULOCYTES NFR BLD AUTO: 0.4 %
IRON SATN MFR SERPL: 14 % (ref 20–50)
IRON SERPL-MCNC: 17 UG/DL (ref 50–170)
LDLC SERPL CALC-MCNC: 62 MG/DL (ref 50–140)
LYMPHOCYTES # BLD AUTO: 0.97 X10(3)/MCL (ref 0.6–4.6)
LYMPHOCYTES NFR BLD AUTO: 9.6 %
MAGNESIUM SERPL-MCNC: 1.7 MG/DL (ref 1.6–2.6)
MCH RBC QN AUTO: 29.4 PG (ref 27–31)
MCHC RBC AUTO-ENTMCNC: 32.5 MG/DL (ref 33–36)
MCV RBC AUTO: 90.4 FL (ref 80–94)
MONOCYTES # BLD AUTO: 0.99 X10(3)/MCL (ref 0.1–1.3)
MONOCYTES NFR BLD AUTO: 9.8 %
NEUTROPHILS # BLD AUTO: 7.9 X10(3)/MCL (ref 2.1–9.2)
NEUTROPHILS NFR BLD AUTO: 78.3 %
NRBC BLD AUTO-RTO: 0 %
PHOSPHATE SERPL-MCNC: 2.4 MG/DL (ref 2.3–4.7)
PLATELET # BLD AUTO: 321 X10(3)/MCL (ref 130–400)
PMV BLD AUTO: 9.6 FL (ref 7.4–10.4)
POTASSIUM SERPL-SCNC: 4.2 MMOL/L (ref 3.5–5.1)
PREALB SERPL-MCNC: 8.6 MG/DL (ref 14–37)
PROT SERPL-MCNC: 5.9 GM/DL (ref 5.8–7.6)
RBC # BLD AUTO: 3.64 X10(6)/MCL (ref 4.2–5.4)
SODIUM SERPL-SCNC: 141 MMOL/L (ref 136–145)
TIBC SERPL-MCNC: 108 UG/DL (ref 70–310)
TIBC SERPL-MCNC: 125 UG/DL (ref 250–450)
TRANSFERRIN SERPL-MCNC: 111 MG/DL (ref 173–360)
TRIGL SERPL-MCNC: 66 MG/DL (ref 37–140)
VLDLC SERPL CALC-MCNC: 13 MG/DL
WBC # SPEC AUTO: 10.1 X10(3)/MCL (ref 4.5–11.5)

## 2022-09-24 PROCEDURE — 84100 ASSAY OF PHOSPHORUS: CPT | Performed by: NURSE PRACTITIONER

## 2022-09-24 PROCEDURE — 80061 LIPID PANEL: CPT | Performed by: NURSE PRACTITIONER

## 2022-09-24 PROCEDURE — 85025 COMPLETE CBC W/AUTO DIFF WBC: CPT | Performed by: NURSE PRACTITIONER

## 2022-09-24 PROCEDURE — 83735 ASSAY OF MAGNESIUM: CPT | Performed by: NURSE PRACTITIONER

## 2022-09-24 PROCEDURE — 25000242 PHARM REV CODE 250 ALT 637 W/ HCPCS: Performed by: NURSE PRACTITIONER

## 2022-09-24 PROCEDURE — 97530 THERAPEUTIC ACTIVITIES: CPT

## 2022-09-24 PROCEDURE — 94761 N-INVAS EAR/PLS OXIMETRY MLT: CPT

## 2022-09-24 PROCEDURE — 36415 COLL VENOUS BLD VENIPUNCTURE: CPT | Performed by: NURSE PRACTITIONER

## 2022-09-24 PROCEDURE — 25000003 PHARM REV CODE 250: Performed by: NURSE PRACTITIONER

## 2022-09-24 PROCEDURE — 82728 ASSAY OF FERRITIN: CPT | Performed by: NURSE PRACTITIONER

## 2022-09-24 PROCEDURE — 84134 ASSAY OF PREALBUMIN: CPT | Performed by: NURSE PRACTITIONER

## 2022-09-24 PROCEDURE — 97116 GAIT TRAINING THERAPY: CPT

## 2022-09-24 PROCEDURE — 94799 UNLISTED PULMONARY SVC/PX: CPT

## 2022-09-24 PROCEDURE — 63600175 PHARM REV CODE 636 W HCPCS: Performed by: NURSE PRACTITIONER

## 2022-09-24 PROCEDURE — 83540 ASSAY OF IRON: CPT | Performed by: NURSE PRACTITIONER

## 2022-09-24 PROCEDURE — 97110 THERAPEUTIC EXERCISES: CPT

## 2022-09-24 PROCEDURE — 80053 COMPREHEN METABOLIC PANEL: CPT | Performed by: NURSE PRACTITIONER

## 2022-09-24 PROCEDURE — 97535 SELF CARE MNGMENT TRAINING: CPT

## 2022-09-24 PROCEDURE — 11800000 HC REHAB PRIVATE ROOM

## 2022-09-24 PROCEDURE — 97161 PT EVAL LOW COMPLEX 20 MIN: CPT

## 2022-09-24 RX ORDER — LISINOPRIL 10 MG/1
20 TABLET ORAL DAILY
Status: DISCONTINUED | OUTPATIENT
Start: 2022-09-25 | End: 2022-10-03 | Stop reason: HOSPADM

## 2022-09-24 RX ADMIN — ACETAMINOPHEN 500 MG: 500 TABLET ORAL at 05:09

## 2022-09-24 RX ADMIN — DOCUSATE SODIUM 100 MG: 100 CAPSULE, LIQUID FILLED ORAL at 09:09

## 2022-09-24 RX ADMIN — FAMOTIDINE 20 MG: 20 TABLET ORAL at 09:09

## 2022-09-24 RX ADMIN — ACETAMINOPHEN 500 MG: 500 TABLET ORAL at 08:09

## 2022-09-24 RX ADMIN — BUPROPION HYDROCHLORIDE 300 MG: 150 TABLET, FILM COATED, EXTENDED RELEASE ORAL at 09:09

## 2022-09-24 RX ADMIN — ENOXAPARIN SODIUM 40 MG: 40 INJECTION SUBCUTANEOUS at 05:09

## 2022-09-24 RX ADMIN — DOCUSATE SODIUM 100 MG: 100 CAPSULE, LIQUID FILLED ORAL at 08:09

## 2022-09-24 RX ADMIN — MIRTAZAPINE 30 MG: 15 TABLET, FILM COATED ORAL at 08:09

## 2022-09-24 RX ADMIN — POLYETHYLENE GLYCOL 3350 17 G: 17 POWDER, FOR SOLUTION ORAL at 08:09

## 2022-09-24 RX ADMIN — CARVEDILOL 12.5 MG: 6.25 TABLET, FILM COATED ORAL at 09:09

## 2022-09-24 RX ADMIN — ATORVASTATIN CALCIUM 40 MG: 40 TABLET, FILM COATED ORAL at 09:09

## 2022-09-24 RX ADMIN — ASPIRIN 81 MG 81 MG: 81 TABLET ORAL at 09:09

## 2022-09-24 RX ADMIN — GABAPENTIN 300 MG: 300 CAPSULE ORAL at 08:09

## 2022-09-24 RX ADMIN — AMLODIPINE BESYLATE 5 MG: 5 TABLET ORAL at 05:09

## 2022-09-24 RX ADMIN — FAMOTIDINE 20 MG: 20 TABLET ORAL at 08:09

## 2022-09-24 RX ADMIN — HYDRALAZINE HYDROCHLORIDE 10 MG: 20 INJECTION INTRAMUSCULAR; INTRAVENOUS at 04:09

## 2022-09-24 RX ADMIN — CARVEDILOL 12.5 MG: 6.25 TABLET, FILM COATED ORAL at 08:09

## 2022-09-24 RX ADMIN — ASPIRIN 81 MG 81 MG: 81 TABLET ORAL at 08:09

## 2022-09-24 RX ADMIN — ACETAMINOPHEN 500 MG: 500 TABLET ORAL at 02:09

## 2022-09-24 RX ADMIN — ACETAMINOPHEN 500 MG: 500 TABLET ORAL at 09:09

## 2022-09-24 NOTE — PT/OT/SLP PROGRESS
Occupational Therapy Inpatient Rehab Treatment    Name: Yoan Xavier  MRN: [id number]    Assessment:  Yoan Xavier is a 81 y.o. female admitted with a medical diagnosis of Closed displaced midcervical fracture of left femur.  She presents with the following impairments/functional limitations:  weakness, impaired endurance, impaired self care skills, impaired functional mobility, gait instability, impaired balance, decreased lower extremity function, pain, orthopedic precautions.    General Precautions: Standard, fall     Orthopedic Precautions:LLE weight bearing as tolerated, LLE posterior precautions     Braces: N/A    Rehab Prognosis: Good; patient would benefit from acute skilled OT services to address these deficits and reach maximum level of function.      History:     Past Medical History:   Diagnosis Date    COPD (chronic obstructive pulmonary disease)     Depression     Hypertension     Insomnia     Lumbar degenerative disc disease     Mixed hyperlipidemia        Past Surgical History:   Procedure Laterality Date    cyst on tailbone      HEMIARTHROPLASTY, HIP, POSTERIOR APPROACH Left 9/21/2022    Procedure: HEMIARTHROPLASTY, HIP, POSTERIOR APPROACH;  Surgeon: Christos Christensen MD;  Location: [facility];  Service: Orthopedics;  Laterality: Left;    right hip replacement         Subjective     Orientation: Oriented x4    Chief Complaint: wishing to groom self today    Patient/Family Comments/goals: return to PLOF        Respiratory Status: Room air    Patients cultural, spiritual, Adventist conflicts given the current situation: no       Objective:     Patient found up in chair with    upon OT entry to room.    Mobility   Patient completed:  Sit to Stand Transfer with supervision with rolling walker  Stand to Sit Transfer with supervision with rolling walker  Tub Transfer Scoot Pivot technique with supervision with TTB and max cues for technique within pxns. Pt has WIS at assisted living facility.      Functional Mobility  Mobilized in room and out in hallway ~120 ft with RW, no LOB Touch A    ADLs   Current Status   Eating     Oral Hygiene 4   Shower, Bathe Self 4 utilizing long handled sponge and TTB   Upper Body Dressing 4   Lower Body Dressing 4 edu on hip kit. Pt able to utilize reacher to doff/don brief and pants   Toileting Hygiene     Toilet Transfer     Putting On, Taking Off Footwear 3- using sock aide     Limiting Factors for ADLs: motor, endurance, balance, and postop pxns and unfamiliarity with modified techniques within hip pxns            Additional Treatments: performed grooming tasks while standing at sink including hair drying, combing and styling then completing applying makeup in unsupported short sit, SPV for standing balance    LifeStyle Change and Education:             Patient left up in chair with all lines intact and call button in reach.     Education provided: Roles and goals of OT, ADLs, transfer training, body mechanics, assistive device, fall prevention, and post-op precautions    Multidisciplinary Problems       Occupational Therapy Goals          Problem: Occupational Therapy    Goal Priority Disciplines Outcome Interventions   Occupational Therapy Goal     OT, PT/OT                         Time Tracking     OT Received On: 09/24/22  Time In 1015     Time Out 1145  Total Time 90 min  Therapy Time: OT Individual: 90  Missed Time:    Missed Time Reason:      Billable Minutes: Self Care/Home Management 90    09/24/2022

## 2022-09-24 NOTE — PT/OT/SLP EVAL
Physical Therapy Rehab Evaluation    Patient Name:  Yoan Xavier   MRN:  [id number]    Recommendations:     Discharge Recommendations:  other (see comments) (pending)   Discharge Equipment Recommendations: walker, rolling   Barriers to discharge:  Lives alone    Assessment:     Yoan Xavier is a 81 y.o. female admitted with a medical diagnosis of Closed displaced midcervical fracture of left femur.  She presents with the following impairments/functional limitations:  weakness, impaired endurance, impaired functional mobility, gait instability decreased strength and balance. .    Rehab Diagnosis: Decreased functional mobility and strength, decreased endurance.     Recent Surgery: L Hemiarthroplasty      General Precautions: Standard, fall     Orthopedic Precautions: LLE weight bearing as tolerated     Braces: N/A    Rehab Prognosis: Good; patient would benefit from acute skilled PT services to address these deficits and reach maximum level of function.      History:     Past Medical History:   Diagnosis Date    COPD (chronic obstructive pulmonary disease)     Depression     Hypertension     Insomnia     Lumbar degenerative disc disease     Mixed hyperlipidemia        Past Surgical History:   Procedure Laterality Date    cyst on tailbone      HEMIARTHROPLASTY, HIP, POSTERIOR APPROACH Left 9/21/2022    Procedure: HEMIARTHROPLASTY, HIP, POSTERIOR APPROACH;  Surgeon: Christos Christensen MD;  Location: [facility];  Service: Orthopedics;  Laterality: Left;    right hip replacement         Subjective     Chief Complaint: hip pain  Patient/Family Comments/goals: to not have to use the walker    Vitals   Vitals at Rest  BP (!) 158/62     HR 76   O2 Sat     Pain Pain Rating 1: 4/10  Location - Side 1: Left  Location - Orientation 1: lower  Location 1: hip     Vitals With Activity  BP (!) 152/69     HR 78   O2 Sat     Pain Pain Rating 1: 4/10  Location - Side 1: Left  Location - Orientation 1: lower  Location 1: hip      Respiratory Status: Room air    Patients cultural, spiritual, Congregation conflicts given the current situation:  NONE       Living Environment  Lives With: alone, facility resident  Living Arrangements: assisted living  Number of Stairs, Main Entrance: none  Equipment Currently Used at Home: walker, rolling    Prior Level of Function  Ambulation Skills: independent  Transfer Skills: independent  ADL Skills: independent  Work/Leisure Activity: independent    Equipment used at home: none.  DME owned (not currently used): none.          Objective:     Communicated with pt prior to session.  Patient found up in chair with  call button  upon PT entry to room.    Exams  LLE Strength:          -       WFL, except hip flex, abd         GGs   Current   Status   Functional Area: Care Score:   Roll Left and Right 6   Sit to Lying 3   Lying to Sitting on Side of Bed 4   Sit to Stand 4   Chair/Bed-to-Chair Transfer 4   Car Transfer 4   Walk 10 Feet 4   Walk 50 Feet with Two Turns 4   Walk 150 Feet 4   Walk 10 Feet Uneven Surface 4   1 Step (Curb) 4   4 Steps 4   12 Steps 4   Picking Up Object 88   Wheel 50 Feet with Two Turns 9   Wheel 150 Feet 9     Therapeutic Activities and Exercises:  Bed mobility, sit to stands. Stairs, bridges, hip abd, slr, laq, hamstring curls    Activity Tolerance    Patient left up in chair with call button in reach.    Education Provided: roles and goals of PT/PTA, transfer training, bed mob, gait training, stair training, body mechanics, and hip precautions    Expected compliance: High compliance    GOALS:   Multidisciplinary Problems       Physical Therapy Goals          Problem: Physical Therapy    Goal Priority Disciplines Outcome Goal Variances Interventions   Physical Therapy Goal     PT, PT/OT                          Plan:     During this hospitalization, patient to be seen 5 x/week to address the identified rehab impairments via gait training, therapeutic activities, therapeutic  exercises, neuromuscular re-education and progress toward the following goals:    Plan of Care Expires: 09/30/22    Time Tracking:     PT Received On: 09/24/22  Time In 0730     Time Out 0850  Total Time 80 min  Therapy Time PT Received On: 09/24/22  PT Start Time: 0730  PT Stop Time: 0850  PT Total Time (min): 80 min  PT Individual: 80  Missed Time: 10 Minutes  Time Missed due to: Patient fatigue    Billable Minutes: Evaluation 20, Gait Training 30, Therapeutic Activity 15, and Therapeutic Exercise 15    09/24/2022

## 2022-09-24 NOTE — PT/OT/SLP PROGRESS
Physical Therapy      Patient Name:  Yoan Xavier   MRN:  [id number]    Patient not seen today secondary to refused due to pt refusal 2/2  Patient fatigue.     Amount of therapy minutes missed:  10 Minutes.    Will follow-up tomorrow.    9/24/2022

## 2022-09-24 NOTE — PROGRESS NOTES
[facility] ([facility])  Rehab Progress Note    Patient Name: Yoan Xavier  MRN: [id number]  Age: 81 y.o. Sex: female  : 1940  Hospital Length of Stay: 1 days  Date of Service: 2022   Chief Complaint: Left femoral neck fracture 2/2 fall s/p left hip hemiarthroplasty on 2022    Subjective:     Basic Information  Admit Information: 81-year-old WF presented to [facility] on  complaints of left hip pain after falling doing laundry twelve days prior.  PMH significant for hypertension and hyperlipidemia.  Workup significant for left femoral neck fracture.  On  tolerated left hemiarthroplasty without perioperative complications. Tolerated transfer to [facility] inpatient rehab unit on  without incident.  Today's Information: No acute events overnight.  Sitting up in chair.  Reports good sleep and appetite.  Bowel maintenance at goal.  BP moderately controlled.  H&H stable-trending up.  Iron level low.  CMP unremarkable.  Albumin 2.5.  Prealbumin 8.6.  FLP at goal.  No imaging today.    Review of patient's allergies indicates:  No Known Allergies     Current Facility-Administered Medications:     acetaminophen tablet 500 mg, 500 mg, Oral, Q4H, Olga Herbert, XOCHILTP, 500 mg at 22 0509    amLODIPine tablet 5 mg, 5 mg, Oral, Daily, Olga Herbert, FNP, 5 mg at 22    aspirin chewable tablet 81 mg, 81 mg, Oral, BID, Olga Herbert, XOCHILTP, 81 mg at 22    atorvastatin tablet 40 mg, 40 mg, Oral, Daily, Olga Herbert, FNP    benzonatate capsule 100 mg, 100 mg, Oral, TID PRN, Olga Herbert, FNP    buPROPion TB24 tablet 300 mg, 300 mg, Oral, Daily, Olga Herbert, FNP    carvediloL tablet 12.5 mg, 12.5 mg, Oral, BID, Olga Herbert, FNP, 12.5 mg at 22    docusate sodium capsule 100 mg, 100 mg, Oral, BID, Olga Herbert, FNP    enoxaparin injection 40 mg, 40 mg, Subcutaneous, Daily, WILFRID Cotter, 40 mg at 22  "[id number]    famotidine tablet 20 mg, 20 mg, Oral, BID, WILFRID Cotter, 20 mg at 09/23/22 2057    famotidine tablet 20 mg, 20 mg, Oral, BID PRN, WILFRID Cotter    fluticasone furoate-vilanteroL 200-25 mcg/dose diskus inhaler 1 puff, 1 puff, Inhalation, Daily **AND** MDI Daily, , , Daily, WILFRID Cotter    gabapentin capsule 300 mg, 300 mg, Oral, QHS, Olga Herbert, FNP, 300 mg at 09/23/22 2057    hydrALAZINE injection 10 mg, 10 mg, Intravenous, Q6H PRN, WILFRID Cotter, 10 mg at 09/24/22 0456    hydrOXYzine pamoate capsule 50 mg, 50 mg, Oral, Nightly PRN, WILFRID Cotter    labetalol 20 mg/4 mL (5 mg/mL) IV syring, 10 mg, Intravenous, Q4H PRN, WILFRID Cotter    lisinopriL tablet 10 mg, 10 mg, Oral, Daily, WILFRID Cotter    methocarbamoL tablet 750 mg, 750 mg, Oral, QID PRN, WILFRID Cotter    metoprolol injection 10 mg, 10 mg, Intravenous, Q2H PRN, WILFRID Cotter    mirtazapine tablet 30 mg, 30 mg, Oral, QHS, WILFRID Cotter, 30 mg at 09/23/22 2057    nitroGLYCERIN SL tablet 0.4 mg, 0.4 mg, Sublingual, Q5 Min PRN, WILFRID Cotter    ondansetron disintegrating tablet 4 mg, 4 mg, Oral, Q6H PRN, WILFRID Cotter    polyethylene glycol packet 17 g, 17 g, Oral, QHS, WILFRID Cotter    traMADoL tablet 50 mg, 50 mg, Oral, Q4H PRN, WILFRID Cotter     Review of Systems   Complete 12-point review of symptoms negative except for what's mentioned in HPI     Objective:     BP (!) 155/58   Pulse 70   Temp 98 °F (36.7 °C) (Oral)   Resp 19   Ht 5' 8" (1.727 m)   Wt 67.7 kg (149 lb 4 oz)   SpO2 (!) 94%   BMI 22.69 kg/m²        Intake/Output Summary (Last 24 hours) at 9/24/2022 0903  Last data filed at 9/23/2022 1800  Gross per 24 hour   Intake 120 ml   Output --   Net 120 ml       Physical Exam  Constitutional:       Appearance: Normal appearance.   HENT:      Head: Normocephalic.      Mouth/Throat:      Mouth: Mucous membranes are moist. "   Eyes:      Pupils: Pupils are equal, round, and reactive to light.   Cardiovascular:      Rate and Rhythm: Normal rate and regular rhythm.      Heart sounds: Normal heart sounds.   Pulmonary:      Effort: Pulmonary effort is normal.      Breath sounds: Normal breath sounds.   Abdominal:      General: Bowel sounds are normal.      Palpations: Abdomen is soft.   Musculoskeletal:      Cervical back: Neck supple.      Comments: Generalized weakness and muscle atrophy.  Left hip dressing clean and intact.   Skin:     General: Skin is warm and dry.   Neurological:      General: No focal deficit present.      Mental Status: She is alert and oriented to person, place, and time.   Psychiatric:         Mood and Affect: Mood normal.         Behavior: Behavior normal.         Thought Content: Thought content normal.         Judgment: Judgment normal.     Lines/Drains/Airways       Epidural Line  Duration                  Neuraxial Analgesia/Anesthesia Assessment (using dermatomes) Epidural 09/21/22 1236 2 days         Neuraxial Analgesia/Anesthesia Assessment (using dermatomes) Epidural 09/21/22 1236 2 days              Peripheral Intravenous Line  Duration                  Peripheral IV - Single Lumen 09/20/22 1525 18 G Posterior;Right Forearm 3 days                    Labs  Admission on 09/23/2022   Component Date Value Ref Range Status    Cholesterol Total 09/24/2022 100  <=200 mg/dL Final    HDL Cholesterol 09/24/2022 25 (L)  35 - 60 mg/dL Final    Triglyceride 09/24/2022 66  37 - 140 mg/dL Final    Cholesterol/HDL Ratio 09/24/2022 4  0 - 5 Final    Very Low Density Lipoprotein 09/24/2022 13   Final    LDL Cholesterol 09/24/2022 62.00  50.00 - 140.00 mg/dL Final    Sodium Level 09/24/2022 141  136 - 145 mmol/L Final    Potassium Level 09/24/2022 4.2  3.5 - 5.1 mmol/L Final    Chloride 09/24/2022 104  98 - 107 mmol/L Final    Carbon Dioxide 09/24/2022 29  23 - 31 mmol/L Final    Glucose Level 09/24/2022 80 (L)  82 - 115  mg/dL Final    Blood Urea Nitrogen 09/24/2022 15.6  9.8 - 20.1 mg/dL Final    Creatinine 09/24/2022 0.58  0.55 - 1.02 mg/dL Final    Calcium Level Total 09/24/2022 9.4  8.4 - 10.2 mg/dL Final    Protein Total 09/24/2022 5.9  5.8 - 7.6 gm/dL Final    Albumin Level 09/24/2022 2.5 (L)  3.4 - 4.8 gm/dL Final    Globulin 09/24/2022 3.4  2.4 - 3.5 gm/dL Final    Albumin/Globulin Ratio 09/24/2022 0.7 (L)  1.1 - 2.0 ratio Final    Bilirubin Total 09/24/2022 0.6  <=1.5 mg/dL Final    Alkaline Phosphatase 09/24/2022 54  40 - 150 unit/L Final    Alanine Aminotransferase 09/24/2022 7  0 - 55 unit/L Final    Aspartate Aminotransferase 09/24/2022 16  5 - 34 unit/L Final    eGFR 09/24/2022 >60  mls/min/1.73/m2 Final    Prealbumin 09/24/2022 8.6 (L)  14.0 - 37.0 mg/dL Final    Ferritin Level 09/24/2022 596.23 (H)  4.63 - 204.00 ng/mL Final    Iron Binding Capacity Unsaturated 09/24/2022 108  70 - 310 ug/dL Final    Iron Level 09/24/2022 17 (L)  50 - 170 ug/dL Final    Transferrin 09/24/2022 111 (L)  173 - 360 mg/dL Final    Iron Binding Capacity Total 09/24/2022 125 (L)  250 - 450 ug/dL Final    Iron Saturation 09/24/2022 14 (L)  20 - 50 % Final    Magnesium Level 09/24/2022 1.70  1.60 - 2.60 mg/dL Final    Phosphorus Level 09/24/2022 2.4  2.3 - 4.7 mg/dL Final    WBC 09/24/2022 10.1  4.5 - 11.5 x10(3)/mcL Final    RBC 09/24/2022 3.64 (L)  4.20 - 5.40 x10(6)/mcL Final    Hgb 09/24/2022 10.7 (L)  12.0 - 16.0 gm/dL Final    Hct 09/24/2022 32.9 (L)  37.0 - 47.0 % Final    MCV 09/24/2022 90.4  80.0 - 94.0 fL Final    MCH 09/24/2022 29.4  27.0 - 31.0 pg Final    MCHC 09/24/2022 32.5 (L)  33.0 - 36.0 mg/dL Final    RDW 09/24/2022 13.8  11.5 - 17.0 % Final    Platelet 09/24/2022 321  130 - 400 x10(3)/mcL Final    MPV 09/24/2022 9.6  7.4 - 10.4 fL Final    Neut % 09/24/2022 78.3  % Final    Lymph % 09/24/2022 9.6  % Final    Mono % 09/24/2022 9.8  % Final    Eos % 09/24/2022 1.5  % Final    Basophil % 09/24/2022 0.4  % Final    Lymph #  09/24/2022 0.97  0.6 - 4.6 x10(3)/mcL Final    Neut # 09/24/2022 7.9  2.1 - 9.2 x10(3)/mcL Final    Mono # 09/24/2022 0.99  0.1 - 1.3 x10(3)/mcL Final    Eos # 09/24/2022 0.15  0 - 0.9 x10(3)/mcL Final    Baso # 09/24/2022 0.04  0 - 0.2 x10(3)/mcL Final    IG# 09/24/2022 0.04  0 - 0.04 x10(3)/mcL Final    IG% 09/24/2022 0.4  % Final    NRBC% 09/24/2022 0.0  % Final     Radiology  Left hip x-ray 9/22:  Good position of left hip prosthesis    Assessment/Plan:     81 y.o. WM admitted on 9/23/2022    Left femoral neck fracture 2/2 fall   - s/p left hip hemiarthroplasty on 09/21/2022  - LLE WBAT  - continue                Aspirin 81 mg b.i.d.                 Methocarbamol 750 mg 4 times daily p.r.n. muscle spasms                 Gabapentin 300 mg at bedtime                Tylenol 500 mg every 4 hours                 Tramadol 50 mg every 4 hours p.r.n.  - follow-up outpatient with orthopedic surgery     GERD  - Avoid spicy foods, and nothing to eat or drink within x2 hours of bedtime or laying flat (water is ok)   - Avoid NSAIDs (Advil, ibuprofen, naproxen...) and resendiz-2 inhibitors (Mobic, Celebrex)    - continue                Pepcid 20 mg b.i.d.     HTN  - BP mod control!!  - continue                Lisinopril 20 mg daily (increased 9/24)                Amlodipine 5 mg daily                 Coreg 12.5 mg b.i.d.                Hydralazine 10 mg every 2 hours as needed for BP > 160/90                Labetalol 10 mg every 2 hours as needed for BP > 160/90     HLD  - FLP with next labs  - continue                Atorvastatin 40 mg daily     Major depressive disorder  - In remission   - Continue                Wellbutrin 300 mg daily      Constipation  - Stable   - Continue                 MiraLax 17 g nightly                 Colace 100 mg b.i.d.     Insomnia   - Stable   - Continue                Mirtazapine 30 mg nightly      COPD   - Stable   - Continue                Symbicort 2 puffs daily     Tobacco abuse   -  Current  - Smokes 1 pack of cigarettes daily  - Refuses nicotine patch   - Counseled on smoking cessation     Normocytic anemia  - asymptomatic  - H/H stable   - no evidence of active bleeds  - will closely monitor and transfuse if needed      VTE Prophylaxis:  Lovenox 40 mg daily  COVID-19 testing: Negative 9/23/2022  COVID-19 vaccination status:  Unvaccinated     POA: no  Living will: no  Contacts: Namrata Yu (sister) [phone]                     Lex Yu (brother-in-law) [phone]     CODE STATUS: Full  Internal Medicine (attending): Vasiliy Connell MD  Physiatry (consulting):  Vernon Manrique MD     OUTPATIENT PROVIDERS  PCP: Ronal Zuluaga MD  Orthopedic surgery:  Christos Christensen MD     DISPOSITION: Condition stable.  Sleep hygiene, bowel maintenance, and appetite at goal.  BP moderately controlled.  Lab work unremarkable.  Continue aggressive mobilization as tolerated.  Monitor closely.  Notify of acute changes.     Total time spent on this encounter including chart review and direct 1-on-1 patient interaction: 46 minutes   Over 50% of this time was spent in counseling and coordination of care

## 2022-09-25 PROCEDURE — 63600175 PHARM REV CODE 636 W HCPCS: Performed by: NURSE PRACTITIONER

## 2022-09-25 PROCEDURE — 25000003 PHARM REV CODE 250: Performed by: NURSE PRACTITIONER

## 2022-09-25 PROCEDURE — 11800000 HC REHAB PRIVATE ROOM

## 2022-09-25 PROCEDURE — 94761 N-INVAS EAR/PLS OXIMETRY MLT: CPT

## 2022-09-25 RX ADMIN — FAMOTIDINE 20 MG: 20 TABLET ORAL at 08:09

## 2022-09-25 RX ADMIN — ACETAMINOPHEN 500 MG: 500 TABLET ORAL at 09:09

## 2022-09-25 RX ADMIN — HYDROXYZINE PAMOATE 50 MG: 50 CAPSULE ORAL at 12:09

## 2022-09-25 RX ADMIN — ENOXAPARIN SODIUM 40 MG: 40 INJECTION SUBCUTANEOUS at 04:09

## 2022-09-25 RX ADMIN — ATORVASTATIN CALCIUM 40 MG: 40 TABLET, FILM COATED ORAL at 09:09

## 2022-09-25 RX ADMIN — ASPIRIN 81 MG 81 MG: 81 TABLET ORAL at 08:09

## 2022-09-25 RX ADMIN — AMLODIPINE BESYLATE 5 MG: 5 TABLET ORAL at 08:09

## 2022-09-25 RX ADMIN — GABAPENTIN 300 MG: 300 CAPSULE ORAL at 08:09

## 2022-09-25 RX ADMIN — ACETAMINOPHEN 500 MG: 500 TABLET ORAL at 06:09

## 2022-09-25 RX ADMIN — LISINOPRIL 20 MG: 10 TABLET ORAL at 09:09

## 2022-09-25 RX ADMIN — CARVEDILOL 12.5 MG: 6.25 TABLET, FILM COATED ORAL at 08:09

## 2022-09-25 RX ADMIN — ACETAMINOPHEN 500 MG: 500 TABLET ORAL at 01:09

## 2022-09-25 RX ADMIN — BUPROPION HYDROCHLORIDE 300 MG: 150 TABLET, FILM COATED, EXTENDED RELEASE ORAL at 09:09

## 2022-09-25 RX ADMIN — DOCUSATE SODIUM 100 MG: 100 CAPSULE, LIQUID FILLED ORAL at 08:09

## 2022-09-25 RX ADMIN — HYDROXYZINE PAMOATE 50 MG: 50 CAPSULE ORAL at 08:09

## 2022-09-25 RX ADMIN — POLYETHYLENE GLYCOL 3350 17 G: 17 POWDER, FOR SOLUTION ORAL at 08:09

## 2022-09-25 RX ADMIN — MIRTAZAPINE 30 MG: 15 TABLET, FILM COATED ORAL at 08:09

## 2022-09-25 NOTE — PROGRESS NOTES
[facility] ([facility])  Rehab Progress Note    Patient Name: Yoan Xavier  MRN: [id number]  Age: 81 y.o. Sex: female  : 1940  Hospital Length of Stay: 2 days  Date of Service: 2022   Chief Complaint: Left femoral neck fracture 2/2 fall s/p left hip hemiarthroplasty on 2022    Subjective:     Basic Information  Admit Information: 81-year-old WF presented to [facility] on  complaints of left hip pain after falling doing laundry twelve days prior.  PMH significant for hypertension and hyperlipidemia.  Workup significant for left femoral neck fracture.  On  tolerated left hemiarthroplasty without perioperative complications. Tolerated transfer to [facility] inpatient rehab unit on  without incident.  Today's Information: No acute events overnight.  Sitting up in chair.  Reports good sleep and appetite.  Last BM .  Vital signs at goal with no recorded fevers.  No labs or imaging today.    Review of patient's allergies indicates:  No Known Allergies     Current Facility-Administered Medications:     acetaminophen tablet 500 mg, 500 mg, Oral, Q4H, Olga Herbert, FNP, 500 mg at 22    amLODIPine tablet 5 mg, 5 mg, Oral, Daily, Olga Herbert, FNP, 5 mg at 22 050    aspirin chewable tablet 81 mg, 81 mg, Oral, BID, Olga Herbert, FNP, 81 mg at 22    atorvastatin tablet 40 mg, 40 mg, Oral, Daily, Olga Herbert, FNP, 40 mg at 22    benzonatate capsule 100 mg, 100 mg, Oral, TID PRN, Olga Herbert, FNP    buPROPion TB24 tablet 300 mg, 300 mg, Oral, Daily, Olga Herbert, FNP, 300 mg at 22    carvediloL tablet 12.5 mg, 12.5 mg, Oral, BID, Olga Herbert, FNP, 12.5 mg at 22    docusate sodium capsule 100 mg, 100 mg, Oral, BID, Olga Herbert, FNP, 100 mg at 22    enoxaparin injection 40 mg, 40 mg, Subcutaneous, Daily, WILFRID Cotter, 40 mg at 22 170    famotidine  "tablet 20 mg, 20 mg, Oral, BID, WILFRID Cotter, 20 mg at 09/24/22 2023    famotidine tablet 20 mg, 20 mg, Oral, BID PRN, WILFRID Cotter    fluticasone furoate-vilanteroL 200-25 mcg/dose diskus inhaler 1 puff, 1 puff, Inhalation, Daily **AND** MDI Daily, , , Daily, WILFRID Cotter    gabapentin capsule 300 mg, 300 mg, Oral, QHS, XOCHILT CotterP, 300 mg at 09/24/22 2023    hydrALAZINE injection 10 mg, 10 mg, Intravenous, Q6H PRN, WILFRID Cotter, 10 mg at 09/24/22 0456    hydrOXYzine pamoate capsule 50 mg, 50 mg, Oral, Nightly PRN, XOCHILT CotterP, 50 mg at 09/25/22 0004    labetalol 20 mg/4 mL (5 mg/mL) IV syring, 10 mg, Intravenous, Q4H PRN, WILFRID Cotter    lisinopriL tablet 20 mg, 20 mg, Oral, Daily, Robert A Chuy, XOCHILTP    methocarbamoL tablet 750 mg, 750 mg, Oral, QID PRN, WILFRID Cotter    metoprolol injection 10 mg, 10 mg, Intravenous, Q2H PRN, WILFRID Cotter    mirtazapine tablet 30 mg, 30 mg, Oral, QHS, WILFRID Cotter, 30 mg at 09/24/22 2023    nitroGLYCERIN SL tablet 0.4 mg, 0.4 mg, Sublingual, Q5 Min PRN, WILFRID Cotter    ondansetron disintegrating tablet 4 mg, 4 mg, Oral, Q6H PRN, WILFRID Cotter    polyethylene glycol packet 17 g, 17 g, Oral, QHS, WILFRID Cotter, 17 g at 09/24/22 2028    traMADoL tablet 50 mg, 50 mg, Oral, Q4H PRN, WILFRID Cotter     Review of Systems   Complete 12-point review of symptoms negative except for what's mentioned in HPI     Objective:     BP (!) 120/55   Pulse 66   Temp 98 °F (36.7 °C) (Oral)   Resp 17   Ht 5' 8" (1.727 m)   Wt 67.7 kg (149 lb 4 oz)   SpO2 (!) 92%   BMI 22.69 kg/m²        Intake/Output Summary (Last 24 hours) at 9/25/2022 0521  Last data filed at 9/25/2022 0000  Gross per 24 hour   Intake 960 ml   Output --   Net 960 ml         Physical Exam  Constitutional:       Appearance: Normal appearance.   HENT:      Head: Normocephalic.      Mouth/Throat:      " Mouth: Mucous membranes are moist.   Eyes:      Pupils: Pupils are equal, round, and reactive to light.   Cardiovascular:      Rate and Rhythm: Normal rate and regular rhythm.      Heart sounds: Normal heart sounds.   Pulmonary:      Effort: Pulmonary effort is normal.      Breath sounds: Normal breath sounds.   Abdominal:      General: Bowel sounds are normal.      Palpations: Abdomen is soft.   Musculoskeletal:      Cervical back: Neck supple.      Comments: Generalized weakness and muscle atrophy.  Left hip dressing clean and intact.   Skin:     General: Skin is warm and dry.   Neurological:      General: No focal deficit present.      Mental Status: She is alert and oriented to person, place, and time.   Psychiatric:         Mood and Affect: Mood normal.         Behavior: Behavior normal.         Thought Content: Thought content normal.         Judgment: Judgment normal.     Lines/Drains/Airways       Epidural Line  Duration                  Neuraxial Analgesia/Anesthesia Assessment (using dermatomes) Epidural 09/21/22 1236 3 days         Neuraxial Analgesia/Anesthesia Assessment (using dermatomes) Epidural 09/21/22 1236 3 days              Peripheral Intravenous Line  Duration                  Peripheral IV - Single Lumen 09/20/22 1525 18 G Posterior;Right Forearm 4 days                    Labs  Admission on 09/23/2022   Component Date Value Ref Range Status    Cholesterol Total 09/24/2022 100  <=200 mg/dL Final    HDL Cholesterol 09/24/2022 25 (L)  35 - 60 mg/dL Final    Triglyceride 09/24/2022 66  37 - 140 mg/dL Final    Cholesterol/HDL Ratio 09/24/2022 4  0 - 5 Final    Very Low Density Lipoprotein 09/24/2022 13   Final    LDL Cholesterol 09/24/2022 62.00  50.00 - 140.00 mg/dL Final    Sodium Level 09/24/2022 141  136 - 145 mmol/L Final    Potassium Level 09/24/2022 4.2  3.5 - 5.1 mmol/L Final    Chloride 09/24/2022 104  98 - 107 mmol/L Final    Carbon Dioxide 09/24/2022 29  23 - 31 mmol/L Final    Glucose  Level 09/24/2022 80 (L)  82 - 115 mg/dL Final    Blood Urea Nitrogen 09/24/2022 15.6  9.8 - 20.1 mg/dL Final    Creatinine 09/24/2022 0.58  0.55 - 1.02 mg/dL Final    Calcium Level Total 09/24/2022 9.4  8.4 - 10.2 mg/dL Final    Protein Total 09/24/2022 5.9  5.8 - 7.6 gm/dL Final    Albumin Level 09/24/2022 2.5 (L)  3.4 - 4.8 gm/dL Final    Globulin 09/24/2022 3.4  2.4 - 3.5 gm/dL Final    Albumin/Globulin Ratio 09/24/2022 0.7 (L)  1.1 - 2.0 ratio Final    Bilirubin Total 09/24/2022 0.6  <=1.5 mg/dL Final    Alkaline Phosphatase 09/24/2022 54  40 - 150 unit/L Final    Alanine Aminotransferase 09/24/2022 7  0 - 55 unit/L Final    Aspartate Aminotransferase 09/24/2022 16  5 - 34 unit/L Final    eGFR 09/24/2022 >60  mls/min/1.73/m2 Final    Prealbumin 09/24/2022 8.6 (L)  14.0 - 37.0 mg/dL Final    Ferritin Level 09/24/2022 596.23 (H)  4.63 - 204.00 ng/mL Final    Iron Binding Capacity Unsaturated 09/24/2022 108  70 - 310 ug/dL Final    Iron Level 09/24/2022 17 (L)  50 - 170 ug/dL Final    Transferrin 09/24/2022 111 (L)  173 - 360 mg/dL Final    Iron Binding Capacity Total 09/24/2022 125 (L)  250 - 450 ug/dL Final    Iron Saturation 09/24/2022 14 (L)  20 - 50 % Final    Magnesium Level 09/24/2022 1.70  1.60 - 2.60 mg/dL Final    Phosphorus Level 09/24/2022 2.4  2.3 - 4.7 mg/dL Final    WBC 09/24/2022 10.1  4.5 - 11.5 x10(3)/mcL Final    RBC 09/24/2022 3.64 (L)  4.20 - 5.40 x10(6)/mcL Final    Hgb 09/24/2022 10.7 (L)  12.0 - 16.0 gm/dL Final    Hct 09/24/2022 32.9 (L)  37.0 - 47.0 % Final    MCV 09/24/2022 90.4  80.0 - 94.0 fL Final    MCH 09/24/2022 29.4  27.0 - 31.0 pg Final    MCHC 09/24/2022 32.5 (L)  33.0 - 36.0 mg/dL Final    RDW 09/24/2022 13.8  11.5 - 17.0 % Final    Platelet 09/24/2022 321  130 - 400 x10(3)/mcL Final    MPV 09/24/2022 9.6  7.4 - 10.4 fL Final    Neut % 09/24/2022 78.3  % Final    Lymph % 09/24/2022 9.6  % Final    Mono % 09/24/2022 9.8  % Final    Eos % 09/24/2022 1.5  % Final    Basophil %  09/24/2022 0.4  % Final    Lymph # 09/24/2022 0.97  0.6 - 4.6 x10(3)/mcL Final    Neut # 09/24/2022 7.9  2.1 - 9.2 x10(3)/mcL Final    Mono # 09/24/2022 0.99  0.1 - 1.3 x10(3)/mcL Final    Eos # 09/24/2022 0.15  0 - 0.9 x10(3)/mcL Final    Baso # 09/24/2022 0.04  0 - 0.2 x10(3)/mcL Final    IG# 09/24/2022 0.04  0 - 0.04 x10(3)/mcL Final    IG% 09/24/2022 0.4  % Final    NRBC% 09/24/2022 0.0  % Final     Radiology  Left hip x-ray 9/22:  Good position of left hip prosthesis    Assessment/Plan:     81 y.o. WM admitted on 9/23/2022    Left femoral neck fracture 2/2 fall   - s/p left hip hemiarthroplasty on 09/21/2022  - LLE WBAT  - continue                Aspirin 81 mg b.i.d.                 Methocarbamol 750 mg 4 times daily p.r.n. muscle spasms                 Gabapentin 300 mg at bedtime                Tylenol 500 mg every 4 hours                 Tramadol 50 mg every 4 hours p.r.n.  - follow-up outpatient with orthopedic surgery     GERD  - Avoid spicy foods, and nothing to eat or drink within x2 hours of bedtime or laying flat (water is ok)   - Avoid NSAIDs (Advil, ibuprofen, naproxen...) and resendiz-2 inhibitors (Mobic, Celebrex)    - continue                Pepcid 20 mg b.i.d.     HTN  - BP mod control!!  - continue                Lisinopril 20 mg daily (increased 9/24)                Amlodipine 5 mg daily                 Coreg 12.5 mg b.i.d.                Hydralazine 10 mg every 2 hours as needed for BP > 160/90                Labetalol 10 mg every 2 hours as needed for BP > 160/90     HLD  - FLP with next labs  - continue                Atorvastatin 40 mg daily     Major depressive disorder  - In remission   - Continue                Wellbutrin 300 mg daily      Constipation  - Stable   - Continue                 MiraLax 17 g nightly                 Colace 100 mg b.i.d.     Insomnia   - Stable   - Continue                Mirtazapine 30 mg nightly      COPD   - Stable   - Continue                Symbicort 2 puffs  daily     Tobacco abuse   - Current  - Smokes 1 pack of cigarettes daily  - Refuses nicotine patch   - Counseled on smoking cessation     Normocytic anemia  - asymptomatic  - H/H stable   - no evidence of active bleeds  - will closely monitor and transfuse if needed      VTE Prophylaxis:  Lovenox 40 mg daily  COVID-19 testing: Negative 9/23/2022  COVID-19 vaccination status:  Unvaccinated     POA: no  Living will: no  Contacts: Namrata Yu (sister) [phone]                     Lex Yu (brother-in-law) [phone]     CODE STATUS: Full  Internal Medicine (attending): Vasiliy Connell MD  Physiatry (consulting):  Vernon Manrique MD     OUTPATIENT PROVIDERS  PCP: Ronal Zuluaga MD  Orthopedic surgery:  Christos Christensen MD     DISPOSITION: Condition stable.  Sleep hygiene, bowel maintenance, and appetite at goal.  BP moderately controlled.  Lab work unremarkable.  Continue aggressive mobilization as tolerated.  Monitor closely.  Notify of acute changes. Repeat labs in am.      Total time spent on this encounter including chart review and direct 1-on-1 patient interaction: 36 minutes   Over 50% of this time was spent in counseling and coordination of care

## 2022-09-25 NOTE — CONSULTS
"Inpatient Nutrition Evaluation    Admit Date: 9/23/2022   Total duration of encounter: 2 days    Nutrition Recommendation/Prescription     Continue current diet as tolerated    Nutrition Assessment     Chart Review    Reason Seen: Rehab consult      Diagnosis:  Left femoral neck fracture 2/2 fall s/p left hip hemiarthroplasty on 09/21/2022       Relevant Medical History:   Past Medical History:   Diagnosis Date    COPD (chronic obstructive pulmonary disease)     Depression     Hypertension     Insomnia     Lumbar degenerative disc disease     Mixed hyperlipidemia      Nutrition-Related Medications: statin, pepcid, miralax     Nutrition-Related Labs:  9/25: H/H 10.7/32.9 (L), Iron 17(L), TIBC 125(L), Transferrin 111(L), Ferritin 596.23(H), Iron Sat 14(L), Gluc 80(L), PAB 8.6(L), Alb 2.5(L), HDL 25(L), GFR >60    9/22: H/H 11.4/34.7(L), Gluc 139(H), GFR >60    Diet Order: Diet Adult Regular  Oral Supplement Order: none at this time  Appetite/Oral Intake: good/% of meals  Factors Affecting Nutritional Intake: none identified at this time  Food/Holiness/Cultural Preferences: none reported    Wound(s):   incision lt hip     Comments    9/25: Saw pt on third floor prior to rehab admit. Continues with good appetite. Per EMR is averaging 75% po intake x last 3 days. Noted low PAB, likely decreased d/t inflammation s/p fracture/surgery. No new nutrition related issues noted at this time. No significant wt loss noted per chart review. Pt is meeting >75% est nutrition needs at this time. See est needs.    RD note from 3rd floor prior to rehab admit:  Pt reports good appetite and jokes "You never have to worry about me not eating". Tells me is getting plenty to eat. Denies any N/V/D or C/S difficulties. Denies any recent wt loss or any nutrition related concerns at this time.     Anthropometrics    Height: 5' 8" (172.7 cm)    Last Weight: 67.7 kg (149 lb 4 oz) (09/24/22 0500) Weight Method: Standard Scale  BMI " (Calculated): 22.7  BMI Classification: normal (BMI 18.5-24.9)  Ideal Body Weight (IBW), Female: 140 lb  % Ideal Body Weight, Female (lb): 106.92 %    Usual Weight Provided By: patient    Wt Readings from Last 5 Encounters:   09/24/22 67.7 kg (149 lb 4 oz)   09/20/22 68 kg (150 lb)     Weight Change(s) Since Admission:  Admit Weight: 67.9 kg (149 lb 11.1 oz) (09/23/22 1445)   lb     Estimated/Assessed Needs     Weight Used For Calorie Calculations: 67.7 kg  Energy Calorie Requirements (kcal): 1567 kcal   Energy Need Method: MSJ x 1.3 SF  Protein Requirements: 88 g  Weight Used For Protein Calculations: 1.3 g/kg  Fluid Requirements (mL): 1567 mL  Estimated Fluid Requirement Method: 1 mL/kcal       Patient Education    Not applicable.    Monitoring & Evaluation     Dietitian will monitor food and beverage intake, energy intake, weight, and weight change.  Nutrition Risk/Follow-Up: low (follow-up in 5-7 days)  Patients assigned 'low nutrition risk' status do not qualify for a full nutritional assessment but will be monitored and re-evaluated in a 5-7 day time period.

## 2022-09-26 LAB
ALBUMIN SERPL-MCNC: 2.5 GM/DL (ref 3.4–4.8)
ALBUMIN/GLOB SERPL: 0.7 RATIO (ref 1.1–2)
ALP SERPL-CCNC: 50 UNIT/L (ref 40–150)
ALT SERPL-CCNC: 13 UNIT/L (ref 0–55)
AST SERPL-CCNC: 19 UNIT/L (ref 5–34)
BASOPHILS # BLD AUTO: 0.03 X10(3)/MCL (ref 0–0.2)
BASOPHILS NFR BLD AUTO: 0.3 %
BILIRUBIN DIRECT+TOT PNL SERPL-MCNC: 0.6 MG/DL
BUN SERPL-MCNC: 11 MG/DL (ref 9.8–20.1)
CALCIUM SERPL-MCNC: 9.6 MG/DL (ref 8.4–10.2)
CHLORIDE SERPL-SCNC: 103 MMOL/L (ref 98–107)
CO2 SERPL-SCNC: 29 MMOL/L (ref 23–31)
CREAT SERPL-MCNC: 0.62 MG/DL (ref 0.55–1.02)
EOSINOPHIL # BLD AUTO: 0.18 X10(3)/MCL (ref 0–0.9)
EOSINOPHIL NFR BLD AUTO: 1.9 %
ERYTHROCYTE [DISTWIDTH] IN BLOOD BY AUTOMATED COUNT: 13.8 % (ref 11.5–17)
GFR SERPLBLD CREATININE-BSD FMLA CKD-EPI: >60 MLS/MIN/1.73/M2
GLOBULIN SER-MCNC: 3.5 GM/DL (ref 2.4–3.5)
GLUCOSE SERPL-MCNC: 87 MG/DL (ref 82–115)
HCT VFR BLD AUTO: 32 % (ref 37–47)
HGB BLD-MCNC: 10.5 GM/DL (ref 12–16)
IMM GRANULOCYTES # BLD AUTO: 0.03 X10(3)/MCL (ref 0–0.04)
IMM GRANULOCYTES NFR BLD AUTO: 0.3 %
LYMPHOCYTES # BLD AUTO: 1.19 X10(3)/MCL (ref 0.6–4.6)
LYMPHOCYTES NFR BLD AUTO: 12.4 %
MAGNESIUM SERPL-MCNC: 1.6 MG/DL (ref 1.6–2.6)
MCH RBC QN AUTO: 28.8 PG (ref 27–31)
MCHC RBC AUTO-ENTMCNC: 32.8 MG/DL (ref 33–36)
MCV RBC AUTO: 87.9 FL (ref 80–94)
MONOCYTES # BLD AUTO: 0.97 X10(3)/MCL (ref 0.1–1.3)
MONOCYTES NFR BLD AUTO: 10.1 %
NEUTROPHILS # BLD AUTO: 7.2 X10(3)/MCL (ref 2.1–9.2)
NEUTROPHILS NFR BLD AUTO: 75 %
NRBC BLD AUTO-RTO: 0 %
PHOSPHATE SERPL-MCNC: 3.2 MG/DL (ref 2.3–4.7)
PLATELET # BLD AUTO: 353 X10(3)/MCL (ref 130–400)
PMV BLD AUTO: 8.9 FL (ref 7.4–10.4)
POTASSIUM SERPL-SCNC: 4.4 MMOL/L (ref 3.5–5.1)
PREALB SERPL-MCNC: 10.8 MG/DL (ref 14–37)
PROT SERPL-MCNC: 6 GM/DL (ref 5.8–7.6)
RBC # BLD AUTO: 3.64 X10(6)/MCL (ref 4.2–5.4)
SODIUM SERPL-SCNC: 139 MMOL/L (ref 136–145)
VIEW PATHOLOGY REPORT (RELIAPATH): NORMAL
WBC # SPEC AUTO: 9.6 X10(3)/MCL (ref 4.5–11.5)

## 2022-09-26 PROCEDURE — 84100 ASSAY OF PHOSPHORUS: CPT | Performed by: NURSE PRACTITIONER

## 2022-09-26 PROCEDURE — 97116 GAIT TRAINING THERAPY: CPT

## 2022-09-26 PROCEDURE — 11800000 HC REHAB PRIVATE ROOM

## 2022-09-26 PROCEDURE — 80053 COMPREHEN METABOLIC PANEL: CPT | Performed by: NURSE PRACTITIONER

## 2022-09-26 PROCEDURE — 25000003 PHARM REV CODE 250: Performed by: NURSE PRACTITIONER

## 2022-09-26 PROCEDURE — 97530 THERAPEUTIC ACTIVITIES: CPT | Mod: CQ

## 2022-09-26 PROCEDURE — 83735 ASSAY OF MAGNESIUM: CPT | Performed by: NURSE PRACTITIONER

## 2022-09-26 PROCEDURE — 36415 COLL VENOUS BLD VENIPUNCTURE: CPT | Performed by: NURSE PRACTITIONER

## 2022-09-26 PROCEDURE — 63600175 PHARM REV CODE 636 W HCPCS: Performed by: NURSE PRACTITIONER

## 2022-09-26 PROCEDURE — 97110 THERAPEUTIC EXERCISES: CPT

## 2022-09-26 PROCEDURE — 84134 ASSAY OF PREALBUMIN: CPT | Performed by: NURSE PRACTITIONER

## 2022-09-26 PROCEDURE — 97535 SELF CARE MNGMENT TRAINING: CPT

## 2022-09-26 PROCEDURE — 85025 COMPLETE CBC W/AUTO DIFF WBC: CPT | Performed by: NURSE PRACTITIONER

## 2022-09-26 RX ADMIN — TRAMADOL HYDROCHLORIDE 50 MG: 50 TABLET, COATED ORAL at 02:09

## 2022-09-26 RX ADMIN — CARVEDILOL 12.5 MG: 6.25 TABLET, FILM COATED ORAL at 08:09

## 2022-09-26 RX ADMIN — ACETAMINOPHEN 500 MG: 500 TABLET ORAL at 05:09

## 2022-09-26 RX ADMIN — BUPROPION HYDROCHLORIDE 300 MG: 150 TABLET, FILM COATED, EXTENDED RELEASE ORAL at 11:09

## 2022-09-26 RX ADMIN — LISINOPRIL 20 MG: 10 TABLET ORAL at 11:09

## 2022-09-26 RX ADMIN — MIRTAZAPINE 30 MG: 15 TABLET, FILM COATED ORAL at 08:09

## 2022-09-26 RX ADMIN — FAMOTIDINE 20 MG: 20 TABLET ORAL at 11:09

## 2022-09-26 RX ADMIN — ATORVASTATIN CALCIUM 40 MG: 40 TABLET, FILM COATED ORAL at 11:09

## 2022-09-26 RX ADMIN — DOCUSATE SODIUM 100 MG: 100 CAPSULE, LIQUID FILLED ORAL at 08:09

## 2022-09-26 RX ADMIN — ASPIRIN 81 MG 81 MG: 81 TABLET ORAL at 08:09

## 2022-09-26 RX ADMIN — DOCUSATE SODIUM 100 MG: 100 CAPSULE, LIQUID FILLED ORAL at 11:09

## 2022-09-26 RX ADMIN — AMLODIPINE BESYLATE 5 MG: 5 TABLET ORAL at 11:09

## 2022-09-26 RX ADMIN — CARVEDILOL 12.5 MG: 6.25 TABLET, FILM COATED ORAL at 11:09

## 2022-09-26 RX ADMIN — POLYETHYLENE GLYCOL 3350 17 G: 17 POWDER, FOR SOLUTION ORAL at 08:09

## 2022-09-26 RX ADMIN — ACETAMINOPHEN 500 MG: 500 TABLET ORAL at 08:09

## 2022-09-26 RX ADMIN — ENOXAPARIN SODIUM 40 MG: 40 INJECTION SUBCUTANEOUS at 07:09

## 2022-09-26 RX ADMIN — FAMOTIDINE 20 MG: 20 TABLET ORAL at 08:09

## 2022-09-26 RX ADMIN — GABAPENTIN 300 MG: 300 CAPSULE ORAL at 08:09

## 2022-09-26 RX ADMIN — ACETAMINOPHEN 500 MG: 500 TABLET ORAL at 02:09

## 2022-09-26 RX ADMIN — ASPIRIN 81 MG 81 MG: 81 TABLET ORAL at 11:09

## 2022-09-26 NOTE — PT/OT/SLP PROGRESS
"Physical Therapy Inpatient Rehab Treatment    Patient Name:  Yoan Xavier   MRN:  [id number]    Recommendations:     Discharge Recommendations:  other (see comments) (pending)   Discharge Equipment Recommendations: walker, rolling   Barriers to discharge: Decreased caregiver support    Assessment:     Yoan Xavier is a 81 y.o. female admitted with a medical diagnosis of Closed displaced midcervical fracture of left femur.  She presents with the following impairments/functional limitations:  weakness, impaired endurance, impaired self care skills, impaired functional mobility, impaired balance, gait instability, decreased lower extremity function, decreased safety awareness, decreased ROM, pain, orthopedic precautions .    Rehab Diagnosis:     Recent Surgery: * No surgery found *      General Precautions: Standard, fall     Orthopedic Precautions:LLE posterior precautions, LLE weight bearing as tolerated     Braces: N/A    Rehab Prognosis: Good; patient would benefit from acute skilled PT services to address these deficits and reach maximum level of function.      History:     Past Medical History:   Diagnosis Date    COPD (chronic obstructive pulmonary disease)     Depression     Hypertension     Insomnia     Lumbar degenerative disc disease     Mixed hyperlipidemia        Past Surgical History:   Procedure Laterality Date    cyst on tailbone      HEMIARTHROPLASTY, HIP, POSTERIOR APPROACH Left 9/21/2022    Procedure: HEMIARTHROPLASTY, HIP, POSTERIOR APPROACH;  Surgeon: Christos Christensen MD;  Location: [facility];  Service: Orthopedics;  Laterality: Left;    right hip replacement         Subjective     Chief Complaint: "my low back hurts"   Patient/Family Comments/goals: to get into bed    Respiratory Status: Room air    Patients cultural, spiritual, Islam conflicts given the current situation:        Objective:     Communicated with nursing  prior to session.  Patient found up in chair with Other (comments) " (in wc)  upon PT entry to room.    Pt is Oriented x3 and Alert.    Functional Mobility:   Bed Mobility:     Sit to Supine: stand by assistance and use of bed rails   Transfers:     Sit to Stand:  stand by assistance with rolling walker  Bed to Chair: stand by assistance with  rolling walker and vc for safety with positioning within rw   using  Step Transfer  Toilet Transfer: stand by assistance and vc for safety  with  rolling walker  using  Step Transfer and post void assisted with changing diaper accidental void vc for safety with maintaining left hip pxs   Gait: Pt ambulates sba 200ft use of rw lle wbat with vc for proximity within rw for safety  with RW with Standby Assistance.      Current   Status  Discharge   Goal   Functional Area: Care Score:    Roll Left and Right   Independent   Sit to Lying 4 Independent   Lying to Sitting on Side of Bed 4 Independent   Sit to Stand 4 Independent   Chair/Bed-to-Chair Transfer 4 Independent   Car Transfer   Independent   Walk 10 Feet 4 Independent   Walk 50 Feet with Two Turns 4 Independent   Walk 150 Feet 4 Independent   Walk 10 Feet Uneven Surface   Independent   1 Step (Curb)   Independent   4 Steps   Independent   12 Steps   Independent   Picking Up Object       Wheel 50 Feet with Two Turns       Wheel 150 Feet           Therapeutic Activities and Exercises:  Pt performed supine HEP per SANDRA 2 sets 10reps with min a with lle slr vc for recall of exercises pt able to recall hip pxs   Pt sat EOB dynamic sitting bal. With activity changed clothing vc for left hip pxs with managing pants     Activity Tolerance    Patient left HOB elevated with call button in reach, bed alarm on, and pt christa tx slowly improving with activity christa and decrease assist still requiring vc for left hip pxs  .    Education provided: transfer training, bed mob, gait training, balance training, assistive device, and strengthening exercises    Expected compliance:    GOALS:   Multidisciplinary  Problems       Physical Therapy Goals          Problem: Physical Therapy    Goal Priority Disciplines Outcome Goal Variances Interventions   Physical Therapy Goal     PT, PT/OT Ongoing, Progressing     Description: Short Term goals      Bed Mobility   Roll Right and Left Independent .  Lying to sitting Independent .  Sitting to lying Independent .    Transfers    Pt will be able to perform Stand step chair/bed to chair transfer With RW Independent .  Pt will be able to perform Sit to stand with RW Independent .  Pt will be able to perform Car transfer with RW Independent .    Ambulation    Pt will ambulate 500 Feet with RW Independent .  Pt will be able to ascend/descend 12 stairs using 1HR Rails Independent .  Pt will be able to ascend/descend 6 inch curb using RW Independent .  Pt will be able to ambulate uneven surfaces/ramp 10 feet with RW Independent .          Timeframe: By Discharge                          Plan:     During this hospitalization, patient to be seen 5 x/week to address the identified rehab impairments via gait training, therapeutic activities, therapeutic exercises, neuromuscular re-education and progress toward the following goals:    Plan of Care Expires:  09/30/22    Additional Information:         Time Tracking:     PT Received On: 09/26/22  Time In 1430     Time Out 1530  Total Time 60 min  Therapy Time PT Individual: 60  Missed Time:    Time Missed due to:      Billable Minutes: Gait Training 15min, Therapeutic Activity 30min, and Therapeutic Exercise 15min    09/26/2022

## 2022-09-26 NOTE — PT/OT/SLP PROGRESS
"Physical Therapy Inpatient Rehab Treatment    Patient Name:  Yoan Xavier   MRN:  [id number]    Recommendations:     Discharge Recommendations:  other (see comments) (pending)   Discharge Equipment Recommendations: walker, rolling   Barriers to discharge:  impaired functional mobility    Assessment:     Yoan Xavier is a 81 y.o. female admitted with a medical diagnosis of Closed displaced midcervical fracture of left femur.  She presents with the following impairments/functional limitations:  weakness, impaired endurance, impaired self care skills, impaired functional mobility, gait instability, decreased lower extremity function, decreased safety awareness, pain, decreased ROM, orthopedic precautions .    Rehab Diagnosis: s/p L SANDRA    Recent Surgery: * No surgery found *      General Precautions: Standard,       Orthopedic Precautions:LLE weight bearing as tolerated, LLE posterior precautions     Braces:      Rehab Prognosis: Good; patient would benefit from acute skilled PT services to address these deficits and reach maximum level of function.      History:     Past Medical History:   Diagnosis Date    COPD (chronic obstructive pulmonary disease)     Depression     Hypertension     Insomnia     Lumbar degenerative disc disease     Mixed hyperlipidemia        Past Surgical History:   Procedure Laterality Date    cyst on tailbone      HEMIARTHROPLASTY, HIP, POSTERIOR APPROACH Left 9/21/2022    Procedure: HEMIARTHROPLASTY, HIP, POSTERIOR APPROACH;  Surgeon: Christos Christensen MD;  Location: [facility];  Service: Orthopedics;  Laterality: Left;    right hip replacement         Subjective     Chief Complaint: s/p L SANDRA  Patient/Family Comments/goals: "to do what I need to do to go home"    Vitals   Vitals at Rest  /59   HR 74   O2 Sat    Pain 0/10 L LE     Vitals With Activity  BP    HR    O2 Sat    Pain 5/10 L LE     Respiratory Status: Room air    Patients cultural, spiritual, Pentecostal conflicts given the " current situation:        Objective:     Communicated with pt prior to session.  Patient found up in chair with    upon PT entry to room.    Pt is Oriented x3 and Alert, Cooperative, and Motivated.    Functional Mobility:   Bed Mobility:     Supine to Sit: stand by assistance  Sit to Supine: stand by assistance  Transfers:     Sit to Stand:  contact guard assistance with rolling walker  Bed to Chair: contact guard assistance with  rolling walker  using  Stand Pivot  Gait: Pt ambulates 175' + 150' + 175' with RW with Contact Guard Assistance.      Current   Status  Discharge   Goal   Functional Area: Care Score:    Roll Left and Right   Independent   Sit to Lying 4 Independent   Lying to Sitting on Side of Bed 4 Independent   Sit to Stand 4 Independent   Chair/Bed-to-Chair Transfer 4 Independent   Car Transfer   Independent   Walk 10 Feet 4 Independent   Walk 50 Feet with Two Turns 4 Independent   Walk 150 Feet 4 Independent   Walk 10 Feet Uneven Surface   Independent   1 Step (Curb)   Independent   4 Steps   Independent   12 Steps   Independent   Picking Up Object       Wheel 50 Feet with Two Turns       Wheel 150 Feet           Therapeutic Activities and Exercises:  Supine L LE hamstrings, gastroc, and hip adductors stretch    Supine L LE therex, 2x10 each: quad sets, glute sets, SAQs, SLR (with occasional physical assist from PT), heel slides, and hip abduction.    Activity Tolerance    Patient left up in chair with call button in reach.    Education provided: roles and goals of PT/PTA, transfer training, bed mob, gait training, safety awareness, body mechanics, assistive device, strengthening exercises, and hip precautions    Expected compliance:    GOALS:   Multidisciplinary Problems       Physical Therapy Goals          Problem: Physical Therapy    Goal Priority Disciplines Outcome Goal Variances Interventions   Physical Therapy Goal     PT, PT/OT Ongoing, Progressing     Description: Short Term goals      Bed  Mobility   Roll Right and Left Independent .  Lying to sitting Independent .  Sitting to lying Independent .    Transfers    Pt will be able to perform Stand step chair/bed to chair transfer With RW Independent .  Pt will be able to perform Sit to stand with RW Independent .  Pt will be able to perform Car transfer with RW Independent .    Ambulation    Pt will ambulate 500 Feet with RW Independent .  Pt will be able to ascend/descend 12 stairs using 1HR Rails Independent .  Pt will be able to ascend/descend 6 inch curb using RW Independent .  Pt will be able to ambulate uneven surfaces/ramp 10 feet with RW Independent .          Timeframe: By Discharge                          Plan:     During this hospitalization, patient to be seen 5 x/week to address the identified rehab impairments via gait training, therapeutic activities, therapeutic exercises, neuromuscular re-education and progress toward the following goals:    Plan of Care Expires:  09/30/22    Additional Information:         Time Tracking:     PT Received On:    Time In 0830     Time Out 1000  Total Time 90 min  Therapy Time PT Individual: 90  Missed Time:    Time Missed due to:      Billable Minutes: Gait Training 30 min, Therapeutic Activity 10 min, and Therapeutic Exercise 50 min    09/26/2022

## 2022-09-26 NOTE — PT/OT/SLP EVAL
Recreational Therapy Evaluation      Date of Treatment: 09/26/22  Start Time: 1130  Stop Time: 1200  Total Time: 30 min  Missed Time:    Assessment      Yoan Xavier is a 81 y.o. female admitted with a medical diagnosis of Closed displaced midcervical fracture of left femur.  She presents with the following impairments/functional limitations:  weakness, impaired functional mobility, gait instability, decreased safety awareness .    Rehab Diagnosis:     Recent Surgery: * No surgery found *      General Precautions: Standard, fall     Orthopedic Precautions:LLE weight bearing as tolerated     Braces: N/A    Rehab Prognosis: Good; patient would benefit from acute skilled Recreational Therapy services to address these deficits and reach maximum level of function.      Impairments: Endurance deficits, Safety awareness deficits, and Strength deficits  Rehab Potential: Good  Treatment Recommendations: Continue with Skill TR Service to facilitate functional independence and address impairments/limitations   Treatment Diagnosis: Fall, L femoral neck fx, Lhip hemiarthroplasty, HTN, HLD,  Orientation: Oriented x4  Affect/Behavior: Cooperative  Safety/Judgement: intact   Basic Command Following: intact  Spiritual Cultural: no        History     Past Medical History:   Diagnosis Date    COPD (chronic obstructive pulmonary disease)     Depression     Hypertension     Insomnia     Lumbar degenerative disc disease     Mixed hyperlipidemia        Past Surgical History:   Procedure Laterality Date    cyst on tailbone      HEMIARTHROPLASTY, HIP, POSTERIOR APPROACH Left 9/21/2022    Procedure: HEMIARTHROPLASTY, HIP, POSTERIOR APPROACH;  Surgeon: Christos Christensen MD;  Location: [facility];  Service: Orthopedics;  Laterality: Left;    right hip replacement         Home Environment     Admit Date: 09/23/22  Living Situation  Lives With: alone  Lives in: assisted living  Patients Responsibilities: , Financial management, Health and  "wellness, Laundry, Leisure/play/hobbies, Meal preparation, Retired, Shopping  Number of Children: 0  Occupation:Retired: Clerical work for [employer]    Instrumental Activities of Daily Living     Previous Hand Dominance: Right Current Hand Dominance:       Other iADL Information:        Cognitive Skills Building         Cognitive Observation Activity Assist Position Equipment Response            Comment:      Dynamic Activities      Activity Assist Position Equipment Response   Activity 1 Bean bag toos stand by assistance Standing Rolling walker and Bean bags good   Comment: Recliner to rw transfer was supervision. Ambulated 50 feet with rw at setup. Sit to stand was supervision as was dynamic standing balance/reaching. Standing tolerance was 5 minutes.Ambulated 20 feet to pick bean bags from floor with reaching tool. UE coordination was I. Problem solving skills were setup.        Fine Motor Activities      Activity Assist Position Equipment Response           Comment:        Goals     Patient Goals  Patient Goal 1: "To walk without the walker."    Short Term Goals    Goal  Goal Status   Will increase activity tolerance to settup Initiated   Will improve dynamic standing balance/reaching to supervision Initiated                 Long Term Goals    Goal Goal Status   Will increase standing tolerance to 5,10 minutes Initiated   Will improve dynamic standng balance/reaching to setup Initiated                     Plan       Patient to be seen: Daily  Duration: 2 weeks  Treatments planned: Energy conservation training, Safety education  Treatment plan/goals established with Patient/Caregiver: Yes     "

## 2022-09-26 NOTE — PROGRESS NOTES
"   09/26/22 1130   Rec Therapy Time Calculation   Date of Treatment 09/26/22   Rec Start Time 1130   Rec Stop Time 1200   Rec Total Time (min) 30 min   Time   Treatment time 2 units   Precautions   General Precautions fall   Orthopedic Precautions  LLE weight bearing as tolerated   Braces N/A   Pain/Comfort   Pain Rating 1 5/10   Location - Side 1 Bilateral   Location - Orientation 1 lower   Location 1 back   Pain Addressed 1 Reposition;Other (see comments)  (ice)   OTHER   Treatment Diagnosis Fall, L femoral neck fx, Lhip hemiarthroplasty, HTN, HLD,   Rehab identified problem list/impairments weakness;impaired functional mobility;gait instability;decreased safety awareness   Values/Beliefs/Spiritual Care   Spiritual, Cultural Beliefs, Jainism Practices, Values that Affect Care no   Prior Living/ADLs   Admit Date 09/23/22   Lives With alone   Living Arrangements assisted living   Patient responsibilites: ;Financial management;Health and wellness;Laundry;Leisure/play/hobbies;Meal preparation;Retired;Shopping   Number of Children 0   Occupation Retired: Clerical work for [employer]   Previous Hand Domiance Right   Overall Level of Functioning   Activity Tolerance Standby Assist   Dynamic Sitting Balance/Reaching Does not occur   Dynamic Standing Balance/Reaching Standby Assist   Right UE Coodination/Dexterity Independent   Left UE Coordination/Dexterity Independent   Problem Solving/Sequencing Skills Mod Indep   Memory Recall Mod Indep   Attention Span Mod Indep   Patient Goals   Patient Goal 1 "To walk without the walker."   Recreational Therapy Short Term Goals   Short Term Goal 1 Will increase activity tolerance to settup   Short Term Goal 1 Progression Initiated   Short Term Goal 2 Will improve dynamic standing balance/reaching to supervision   Short Term Goal 2 Progression Initiated   Recreational Therapy Long Term Goals   Long Term Goal 1 Will increase standing tolerance to 5,10 minutes   Long Term " Goal 1 Progression Initiated   Long Term Goal 2 Will improve dynamic standng balance/reaching to setup   Long Term Goal 2 Progression Initiated   Plan   Patient to be seen Daily   Planned Duration 2 weeks   Treatments Planned Energy conservation training;Safety education   Treatment plan/goals estblished with Patient/Caregiver Yes

## 2022-09-26 NOTE — PLAN OF CARE
PHQ-2 completed (score=0).  No need for PHQ-9.    Discussed discharge planning and my role in such tasks.    Pt is anxious to return home so is hopeful that her participation in rehab will assist with expediting this.    Pt commented that her sister Namrata and Namrata's , Lex, live nearby her apartment complex (NOT assisted living, simply an elderly apartment complex) and handle her affairs and pharmaceutical needs.

## 2022-09-26 NOTE — PT/OT/SLP PROGRESS
"Occupational Therapy Inpatient Rehab Treatment    Name: Yoan Xavier  MRN: [id number]    Assessment:  Yoan Xavier is a 81 y.o. female admitted with a medical diagnosis of Closed displaced midcervical fracture of left femur.  She presents with the following impairments/functional limitations:  impaired endurance, impaired functional mobility, pain, orthopedic precautions .    General Precautions: Standard, fall     Orthopedic Precautions:LLE weight bearing as tolerated, LLE posterior precautions     Braces: N/A    Rehab Prognosis: Good; patient would benefit from acute skilled OT services to address these deficits and reach maximum level of function.      History:     Past Medical History:   Diagnosis Date    COPD (chronic obstructive pulmonary disease)     Depression     Hypertension     Insomnia     Lumbar degenerative disc disease     Mixed hyperlipidemia        Past Surgical History:   Procedure Laterality Date    cyst on tailbone      HEMIARTHROPLASTY, HIP, POSTERIOR APPROACH Left 9/21/2022    Procedure: HEMIARTHROPLASTY, HIP, POSTERIOR APPROACH;  Surgeon: Christos Christensen MD;  Location: [facility];  Service: Orthopedics;  Laterality: Left;    right hip replacement         Subjective     Orientation: Oriented x4    Chief Complaint: 'I have a lot of back pain that was before my fall"    Patient/Family Comments/goals: "I want to go home soon"        Respiratory Status: Room air    Patients cultural, spiritual, Adventist conflicts given the current situation: no       Objective:     Patient found up in chair with   hospital gown on upon OT entry to room.    Mobility   Patient completed:  Sit to Stand Transfer with contact guard assistance with rolling walker  Stand to Sit Transfer with stand by assistance with rolling walker    Functional Mobility  Pt. Ambulated in ADL kitchen via RW and simulated food prep. Pt. Required a rest break 2* increased pain to low back.     ADLs   Current Status   Eating     Oral " Hygiene     Shower, Bathe Self     Upper Body Dressing 5   Lower Body Dressing 3   Toileting Hygiene     Toilet Transfer     Putting On, Taking Off Footwear 5     Limiting Factors for ADLs: endurance, weakness, and pain      IADLs: retrieval of items from cabinet, refrigerator, microwave and moving along countertop, placing on table       Therapeutic Exercise  B UE AROM on UBE at WC level; 1.5 knight. Also, performed 15 reps B UE AROM for B sh. ABD/Duction, biceps curls, chest press c red (med resistance) T band      LifeStyle Change and Education:             Patient left up in chair with chair alarm on.     Education provided: Roles and goals of OT, ADLs, transfer training, body mechanics, assistive device, wheelchair precautions, safety precautions, fall prevention, post-op precautions, and equipment recommendations    Multidisciplinary Problems       Occupational Therapy Goals          Problem: Occupational Therapy    Goal Priority Disciplines Outcome Interventions   Occupational Therapy Goal     OT, PT/OT                         Time Tracking     OT Received On: 09/26/22  Time In 1300     Time Out 1400  Total Time 60 min  Therapy Time: OT Individual: 60  Missed Time:  0  Missed Time Reason:      Billable Minutes: Self Care/Home Management 45 and Therapeutic Exercise 15    09/26/20221300

## 2022-09-27 PROBLEM — S72.032D: Status: ACTIVE | Noted: 2022-09-22

## 2022-09-27 PROCEDURE — 11800000 HC REHAB PRIVATE ROOM

## 2022-09-27 PROCEDURE — S4991 NICOTINE PATCH NONLEGEND: HCPCS | Performed by: NURSE PRACTITIONER

## 2022-09-27 PROCEDURE — 97535 SELF CARE MNGMENT TRAINING: CPT

## 2022-09-27 PROCEDURE — 97116 GAIT TRAINING THERAPY: CPT

## 2022-09-27 PROCEDURE — 25000003 PHARM REV CODE 250: Performed by: NURSE PRACTITIONER

## 2022-09-27 PROCEDURE — 94761 N-INVAS EAR/PLS OXIMETRY MLT: CPT

## 2022-09-27 PROCEDURE — 63600175 PHARM REV CODE 636 W HCPCS: Performed by: NURSE PRACTITIONER

## 2022-09-27 PROCEDURE — 97530 THERAPEUTIC ACTIVITIES: CPT

## 2022-09-27 PROCEDURE — 97110 THERAPEUTIC EXERCISES: CPT

## 2022-09-27 PROCEDURE — 25000242 PHARM REV CODE 250 ALT 637 W/ HCPCS: Performed by: NURSE PRACTITIONER

## 2022-09-27 PROCEDURE — 99233 SBSQ HOSP IP/OBS HIGH 50: CPT | Mod: ,,, | Performed by: NURSE PRACTITIONER

## 2022-09-27 PROCEDURE — 99233 PR SUBSEQUENT HOSPITAL CARE,LEVL III: ICD-10-PCS | Mod: ,,, | Performed by: NURSE PRACTITIONER

## 2022-09-27 RX ORDER — POLYETHYLENE GLYCOL 3350 17 G/17G
17 POWDER, FOR SOLUTION ORAL 2 TIMES DAILY
Status: DISCONTINUED | OUTPATIENT
Start: 2022-09-27 | End: 2022-10-03 | Stop reason: HOSPADM

## 2022-09-27 RX ORDER — LIDOCAINE 50 MG/G
1 PATCH TOPICAL ONCE
Status: COMPLETED | OUTPATIENT
Start: 2022-09-27 | End: 2022-09-27

## 2022-09-27 RX ORDER — METHOCARBAMOL 500 MG/1
500 TABLET, FILM COATED ORAL 3 TIMES DAILY
Status: DISCONTINUED | OUTPATIENT
Start: 2022-09-27 | End: 2022-10-03 | Stop reason: HOSPADM

## 2022-09-27 RX ORDER — NICOTINE 7MG/24HR
1 PATCH, TRANSDERMAL 24 HOURS TRANSDERMAL DAILY
Status: DISCONTINUED | OUTPATIENT
Start: 2022-09-27 | End: 2022-09-28

## 2022-09-27 RX ORDER — BISACODYL 10 MG
10 SUPPOSITORY, RECTAL RECTAL ONCE
Status: COMPLETED | OUTPATIENT
Start: 2022-09-27 | End: 2022-09-27

## 2022-09-27 RX ORDER — LIDOCAINE 50 MG/G
1 PATCH TOPICAL
Status: DISCONTINUED | OUTPATIENT
Start: 2022-09-28 | End: 2022-10-03 | Stop reason: HOSPADM

## 2022-09-27 RX ADMIN — MIRTAZAPINE 30 MG: 15 TABLET, FILM COATED ORAL at 08:09

## 2022-09-27 RX ADMIN — ATORVASTATIN CALCIUM 40 MG: 40 TABLET, FILM COATED ORAL at 09:09

## 2022-09-27 RX ADMIN — ACETAMINOPHEN 500 MG: 500 TABLET ORAL at 05:09

## 2022-09-27 RX ADMIN — ENOXAPARIN SODIUM 40 MG: 40 INJECTION SUBCUTANEOUS at 05:09

## 2022-09-27 RX ADMIN — METHOCARBAMOL 500 MG: 500 TABLET ORAL at 11:09

## 2022-09-27 RX ADMIN — ACETAMINOPHEN 500 MG: 500 TABLET ORAL at 01:09

## 2022-09-27 RX ADMIN — FAMOTIDINE 20 MG: 20 TABLET ORAL at 09:09

## 2022-09-27 RX ADMIN — CARVEDILOL 12.5 MG: 6.25 TABLET, FILM COATED ORAL at 09:09

## 2022-09-27 RX ADMIN — DOCUSATE SODIUM 100 MG: 100 CAPSULE, LIQUID FILLED ORAL at 08:09

## 2022-09-27 RX ADMIN — GABAPENTIN 300 MG: 300 CAPSULE ORAL at 08:09

## 2022-09-27 RX ADMIN — CARVEDILOL 12.5 MG: 6.25 TABLET, FILM COATED ORAL at 08:09

## 2022-09-27 RX ADMIN — AMLODIPINE BESYLATE 5 MG: 5 TABLET ORAL at 09:09

## 2022-09-27 RX ADMIN — POLYETHYLENE GLYCOL 3350 17 G: 17 POWDER, FOR SOLUTION ORAL at 08:09

## 2022-09-27 RX ADMIN — ASPIRIN 81 MG 81 MG: 81 TABLET ORAL at 08:09

## 2022-09-27 RX ADMIN — LISINOPRIL 20 MG: 10 TABLET ORAL at 09:09

## 2022-09-27 RX ADMIN — ACETAMINOPHEN 500 MG: 500 TABLET ORAL at 11:09

## 2022-09-27 RX ADMIN — NICOTINE 1 PATCH: 7 PATCH, EXTENDED RELEASE TRANSDERMAL at 11:09

## 2022-09-27 RX ADMIN — ASPIRIN 81 MG 81 MG: 81 TABLET ORAL at 09:09

## 2022-09-27 RX ADMIN — LIDOCAINE 5% 1 PATCH: 700 PATCH TOPICAL at 11:09

## 2022-09-27 RX ADMIN — DOCUSATE SODIUM 100 MG: 100 CAPSULE, LIQUID FILLED ORAL at 09:09

## 2022-09-27 RX ADMIN — BISACODYL 10 MG: 10 SUPPOSITORY RECTAL at 05:09

## 2022-09-27 RX ADMIN — FLUTICASONE FUROATE AND VILANTEROL TRIFENATATE 1 PUFF: 200; 25 POWDER RESPIRATORY (INHALATION) at 09:09

## 2022-09-27 RX ADMIN — METHOCARBAMOL 500 MG: 500 TABLET ORAL at 01:09

## 2022-09-27 RX ADMIN — METHOCARBAMOL 500 MG: 500 TABLET ORAL at 08:09

## 2022-09-27 RX ADMIN — FAMOTIDINE 20 MG: 20 TABLET ORAL at 08:09

## 2022-09-27 RX ADMIN — BUPROPION HYDROCHLORIDE 300 MG: 150 TABLET, FILM COATED, EXTENDED RELEASE ORAL at 09:09

## 2022-09-27 NOTE — PROGRESS NOTES
[facility] ([facility])  Rehab Progress Note    Patient Name: Yoan Xavier  MRN: [id number]  Age: 81 y.o. Sex: female  : 1940  Hospital Length of Stay: 4 days  Date of Service: 2022   Chief Complaint: Left femoral neck fracture 2/2 fall s/p left hip hemiarthroplasty on 2022    Subjective:     Basic Information  Admit Information: 81-year-old WF presented to [facility] on  complaints of left hip pain after falling doing laundry twelve days prior.  PMH significant for hypertension and hyperlipidemia.  Workup significant for left femoral neck fracture.  On  tolerated left hemiarthroplasty without perioperative complications. Tolerated transfer to [facility] inpatient rehab unit on  without incident.  Today's Information: No acute events overnight.  Sitting in chair comfortably.  Reports good sleep and appetite.  Last BM .  Vital signs at goal with no recent recorded fevers.  No labs or imaging today.  Request nicotine patch.    Review of patient's allergies indicates:  No Known Allergies     Current Facility-Administered Medications:     acetaminophen tablet 500 mg, 500 mg, Oral, Q4H, Olga Herbert, XOCHILTP, 500 mg at 22 0508    amLODIPine tablet 5 mg, 5 mg, Oral, Daily, Olga Herbert, FNP, 5 mg at 22 0901    aspirin chewable tablet 81 mg, 81 mg, Oral, BID, Olga Herbert, FNP, 81 mg at 22    atorvastatin tablet 40 mg, 40 mg, Oral, Daily, Olga Herbert, FNP, 40 mg at 22 09    benzonatate capsule 100 mg, 100 mg, Oral, TID PRN, Olga Herbert FNP    bisacodyL suppository 10 mg, 10 mg, Rectal, Once, WLIFRID Cotter    buPROPion TB24 tablet 300 mg, 300 mg, Oral, Daily, Olga Herbert, FNP, 300 mg at 22 09    carvediloL tablet 12.5 mg, 12.5 mg, Oral, BID, Olga Herbert FNP, 12.5 mg at 22 09    docusate sodium capsule 100 mg, 100 mg, Oral, BID, WILFRID Cotter, 100 mg at 22 0901     "enoxaparin injection 40 mg, 40 mg, Subcutaneous, Daily, WILFRID Cotter, 40 mg at 09/26/22 1900    famotidine tablet 20 mg, 20 mg, Oral, BID, XOCHILT CotterP, 20 mg at 09/27/22 0901    famotidine tablet 20 mg, 20 mg, Oral, BID PRN, Olga Herbert, FNP    fluticasone furoate-vilanteroL 200-25 mcg/dose diskus inhaler 1 puff, 1 puff, Inhalation, Daily, 1 puff at 09/27/22 0903 **AND** MDI Daily, , , Daily, WILFRID Cotter    gabapentin capsule 300 mg, 300 mg, Oral, QHS, Olga Herbert FNP, 300 mg at 09/26/22 2030    hydrALAZINE injection 10 mg, 10 mg, Intravenous, Q6H PRN, XOCHILT CotterP, 10 mg at 09/24/22 0456    hydrOXYzine pamoate capsule 50 mg, 50 mg, Oral, Nightly PRN, Ogla Herbert FNP, 50 mg at 09/25/22 2006    labetalol 20 mg/4 mL (5 mg/mL) IV syring, 10 mg, Intravenous, Q4H PRN, WILFRID Cotter    lisinopriL tablet 20 mg, 20 mg, Oral, Daily, Robert Vera, FNP, 20 mg at 09/27/22 0901    methocarbamoL tablet 750 mg, 750 mg, Oral, QID PRN, WILFRID Cotter    metoprolol injection 10 mg, 10 mg, Intravenous, Q2H PRN, WILFRID Cotter    mirtazapine tablet 30 mg, 30 mg, Oral, QHS, XOCHILT CotterP, 30 mg at 09/26/22 2030    nitroGLYCERIN SL tablet 0.4 mg, 0.4 mg, Sublingual, Q5 Min PRN, XOCHILT CotterP    ondansetron disintegrating tablet 4 mg, 4 mg, Oral, Q6H PRN, WILFRID Cotter    polyethylene glycol packet 17 g, 17 g, Oral, BID, WILFRID Cotter    traMADoL tablet 50 mg, 50 mg, Oral, Q4H PRN, Olga Herbert, FNP, 50 mg at 09/26/22 1415     Review of Systems   Complete 12-point review of symptoms negative except for what's mentioned in HPI     Objective:     /66   Pulse 62   Temp 98.1 °F (36.7 °C) (Oral)   Resp 18   Ht 5' 8" (1.727 m)   Wt 67.7 kg (149 lb 4 oz)   SpO2 97%   BMI 22.69 kg/m²        Intake/Output Summary (Last 24 hours) at 9/27/2022 0931  Last data filed at 9/27/2022 0800  Gross per 24 hour   Intake 720 ml "   Output --   Net 720 ml         Physical Exam  Constitutional:       Appearance: Normal appearance.   HENT:      Head: Normocephalic.      Mouth/Throat:      Mouth: Mucous membranes are moist.   Eyes:      Pupils: Pupils are equal, round, and reactive to light.   Cardiovascular:      Rate and Rhythm: Normal rate and regular rhythm.      Heart sounds: Normal heart sounds.   Pulmonary:      Effort: Pulmonary effort is normal.      Breath sounds: Normal breath sounds.   Abdominal:      General: Bowel sounds are normal.      Palpations: Abdomen is soft.   Musculoskeletal:      Cervical back: Neck supple.      Comments: Generalized weakness and muscle atrophy.  Left hip dressing clean and intact.   Skin:     General: Skin is warm and dry.   Neurological:      General: No focal deficit present.      Mental Status: She is alert and oriented to person, place, and time.   Psychiatric:         Mood and Affect: Mood normal.         Behavior: Behavior normal.         Thought Content: Thought content normal.         Judgment: Judgment normal.     Lines/Drains/Airways       Epidural Line  Duration                  Neuraxial Analgesia/Anesthesia Assessment (using dermatomes) Epidural 09/21/22 1236 5 days         Neuraxial Analgesia/Anesthesia Assessment (using dermatomes) Epidural 09/21/22 1236 5 days                    Labs  No results found for this or any previous visit (from the past 24 hour(s)).    Radiology  Left hip x-ray 9/22:  Good position of left hip prosthesis    Assessment/Plan:     81 y.o. WM admitted on 9/23/2022    Left femoral neck fracture 2/2 fall   - s/p left hip hemiarthroplasty on 09/21/2022  - LLE WBAT  - continue                Aspirin 81 mg b.i.d.                 Methocarbamol 750 mg 4 times daily p.r.n. muscle spasms                 Gabapentin 300 mg at bedtime                Tylenol 500 mg every 4 hours                 Tramadol 50 mg every 4 hours p.r.n.  - follow-up outpatient with orthopedic surgery      GERD  - Avoid spicy foods, and nothing to eat or drink within x2 hours of bedtime or laying flat (water is ok)   - Avoid NSAIDs (Advil, ibuprofen, naproxen...) and resendiz-2 inhibitors (Mobic, Celebrex)    - continue                Pepcid 20 mg b.i.d.     HTN  - BP mod control!!  - continue                Lisinopril 20 mg daily (increased 9/24)                Amlodipine 5 mg daily                 Coreg 12.5 mg b.i.d.                Hydralazine 10 mg every 2 hours as needed for BP > 160/90                Labetalol 10 mg every 2 hours as needed for BP > 160/90     HLD  - FLP with next labs  - continue                Atorvastatin 40 mg daily     Major depressive disorder  - In remission   - Continue                Wellbutrin 300 mg daily      Constipation  - Stable   - Continue                 MiraLax 17 g b.i.d. (increased 9/27)                Colace 100 mg b.i.d.     Insomnia   - Stable   - Continue                Mirtazapine 30 mg nightly      COPD   - Stable   - Continue                Symbicort 2 puffs daily     Tobacco abuse   - Current  - Smokes 1 pack of cigarettes daily  - continue  Nicotine patch (initiated 9/27)  - Counseled on smoking cessation     Normocytic anemia  - asymptomatic  - H/H stable   - no evidence of active bleeds  - will closely monitor and transfuse if needed      VTE Prophylaxis:  Lovenox 40 mg daily  COVID-19 testing: Negative 9/23/2022  COVID-19 vaccination status:  Unvaccinated     POA: no  Living will: no  Contacts: Namrata Yu (sister) [phone]                     Lex Yu (brother-in-law) [phone]     CODE STATUS: Full  Internal Medicine (attending): Vasiliy Connell MD  Physiatry (consulting):  Vernon Manrique MD     OUTPATIENT PROVIDERS  PCP: Ronal Zuluaga MD  Orthopedic surgery:  Christos Christensen MD     DISPOSITION: Condition stable.  Vital signs at goal.  Initiate nicotine patch as requested.  No labs today.  Sleep hygiene and appetite at goal.  Last BM 9/23.  Initiate  Dulcolax suppository once now, if no BM by 1400 give soapsuds enema.  Continues to progress well with therapies.  Monitor closely.  Notify of any acute changes.  Staffing below.    Staffing 9/27: Continent of bowel and bladder. Good appetite. RT: overall supervision. PT: overall supervision. OT: maintains precautions, overall supervision to partial mod. Projected discharge 10/3.    TeleMedicine MD Visit (live NP visit)  Verbal consent obtained for live A/V Telehealth visit    Originating Site:  [facility] [address] [place] [address]  Place of service of originating site: Inpatient Rehabilitation Facility  Distant Site: [address] [address]  Synchronous Communication Encounter Clock Time: 6 minutes    Olga Herbert NP conducted independent physical examination and assisted with medical documentation.     Total time spent on this encounter including MD + NP chart review and direct 1-on-1 patient interaction: 44 minutes (including TeleMedicine time)  Over 50% of this time was spent in counseling and coordination of care

## 2022-09-27 NOTE — PT/OT/SLP PROGRESS
Recreational Therapy Treatment    Date of Treatment: 09/27/22  Start Time: 1130  Stop Time: 1200  Total Time: 30 min  Missed Time:     General Precautions: fall  Ortho Precautions: LLE weight bearing as tolerated, LLE posterior precautions  Braces: N/A    Vitals   Vitals at Rest  BP    HR    O2 Sat    Pain      Vitals With Activity  BP    HR    O2 Sat    Pain        Treatment     Cognitive Skills Building   Cognitive Observation Activity Assist Position Equipment Response            Comment:          Dynamic Activities   Activity Assist Position Equipment Response   Activity 1 Washer toss supervision Standing Rolling walker and Metal washer good   Comment: Pt  ambulated to and from therapy with rw at setup. Sit to stand was setup as was dynamic standing balance/reaching. Standing tolerance was 7 minutes.  Ambulated 20 feet x2 to pick washers with reacher. RUE coordination was setup and LUE coordination was supervision.  Said she didn't care for this activity but participated willingly       Fine Motor Activities   Activity Assist Position Equipment Response           Comment:          Additional Info: Pt progress, plan of care and discharge plans were discussed during staffing at 0900    Goals     Short Term Goals    Goal  Goal Status   Will increase activity tolerance to settup Met   Will improve dynamic standing balance/reaching to supervision Met                 Long Term Goals    Goal Goal Status   Will increase standing tolerance to 5,10 minutes Progressing   Will improve dynamic standng balance/reaching to setup Progressing

## 2022-09-27 NOTE — PROGRESS NOTES
Subjective:  HPI:  80 yo female with PMH of Hypertension and Mixed hyperlipidemia presented to [facility] ED on 9/20/2022 with a primary complaint of hip pain.  She tripped in home 12 days ago, and states that she felt left hip pain but was able to walk around and do her daily activities using her walker.  She saw her PCP in [place] about a week ago and xray showed hip fracture but she decided to see if it would heal on its own.  Pain persisted and increased therfore she presented to ED.  Diagnosed with a left femoral neck fracture.  Ortho consulted. Underwent left hip hemiarthroplasty on 9/21. Participating with therapy. Functional status includes bed mobility and transfers requiring minimal assistance. Amb w/RW for 10ft and 20ft with CGA. Patient was evaluated, accepted, and admitted to inpatient rehab to improve functional status. Transferred to [facility] on 9/23 without incident.      9/27: Seen with PT, supine on mat. Complaints of LLE aching pain and tightness. States that LBP is much better when lying down and she slept well overnight. Requested a Lidoderm patch to low back. Also requested a nicotine patch. Both ordered. Tolerating therapy.  VSSAF.     Review of Systems  Psychiatric:  Hx mental health/substance abuse: Pt. Reports history of depression a long time ago but not recently. She is a smoker.    Depression/Anxiety:    buPROPion TB24 tablet 300 mg qd  mirtazapine tablet 30 mg qHS  Pain: left hip-controlled  acetaminophen tablet 500 mg q4hr   gabapentin capsule 300 mg qHS  methocarbamoL tablet 750 mg QID PRN spasm  traMADoL tablet 50 mg q4hr PRN pain  Bowels/Bladder: last BM 9/24 per patient (documented 9/23) suppository 9/27  Appetite: good    Sleep: good      mirtazapine tablet 30 mg qHS    Physical Exam  General: well-developed, well-nourished, in no acute distress  Respiratory: equal chest rise, no SOB, no audible wheeze  Cardiovascular: regular rate and rhythm, no edema  Gastrointestinal: soft, non-tender,  non-distended   Musculoskeletal: decreased ROM/strength to LLE  Integumentary: no rashes or skin lesions present, left hip incision-dressing-c/d/i  Neurologic: cranial nerves intact, no signs of peripheral neurological deficit, motor/sensory function intact        Assessment/Plan  Hospital   Closed displaced midcervical fracture of left femur     Non-Hospital   Hypertension (Chronic)   Mixed hyperlipidemia (Chronic)   COPD (chronic obstructive pulmonary disease) (Chronic)   Depression (Chronic)   Lumbar degenerative disc disease (Chronic)       Wounds: left hip incision-dressing-c/d/i  S/p left hip hemiarthroplasty on 9/21  Precautions: WBAT LLE  Bracing/AD: RW  Swallowing: Regular Diet  Function: Tolerating therapy. Continue PT/OT  VTE Prophylaxis:   enoxaparin injection 40 mg SubQ q24hr  Code Status: FULL CODE   Discharge: Lives alone at [facility]. No steps to enter. Completed high school. No  history. 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  Pt. Is single. Never . She lives alone in assisted living and is very independent. Children (0).  Date pending.

## 2022-09-27 NOTE — PROGRESS NOTES
09/27/22 1130   Rec Therapy Time Calculation   Date of Treatment 09/27/22   Rec Start Time 1130   Rec Stop Time 1200   Rec Total Time (min) 30 min   Time   Treatment time 2 units   Precautions   General Precautions fall   Orthopedic Precautions  LLE weight bearing as tolerated;LLE posterior precautions   Braces N/A   Pain/Comfort   Pain Rating 1 3/10   Location - Side 1 Bilateral   Location - Orientation 1 lower   Location 1 back   Pain Addressed 1 Pre-medicate for activity;Nurse notified   OTHER   Rehab identified problem list/impairments weakness;impaired endurance;impaired self care skills;impaired functional mobility;gait instability;impaired balance;decreased lower extremity function;decreased safety awareness;pain;decreased ROM;impaired muscle length;orthopedic precautions   Values/Beliefs/Spiritual Care   Spiritual, Cultural Beliefs, Gnosticism Practices, Values that Affect Care no   Overall Level of Functioning   Activity Tolerance Mod Indep   Dynamic Sitting Balance/Reaching Does not occur   Dynamic Standing Balance/Reaching Mod Indep   Right UE Coodination/Dexterity Standby Assist   Left UE Coordination/Dexterity Mod Indep   Problem Solving/Sequencing Skills Mod Indep   Memory Recall Mod Indep   Attention Span Mod Indep   Recreational Therapy Short Term Goals   Short Term Goal 1 Progression Met   Short Term Goal 2 Progression Met   Recreational Therapy Long Term Goals   Long Term Goal 1 Progression Progressing   Long Term Goal 2 Progression Progressing   Plan   Patient to be seen Daily   Planned Duration 1 week   Treatments Planned Energy conservation training   Treatment plan/goals estblished with Patient/Caregiver Yes

## 2022-09-27 NOTE — PLAN OF CARE
Problem: Rehabilitation (IRF) Plan of Care  Goal: Plan of Care Review  Outcome: Ongoing, Progressing  Flowsheets (Taken 9/27/2022 1552)  Plan of Care Reviewed With: patient  Goal: Absence of New-Onset Illness or Injury  Outcome: Ongoing, Progressing  Intervention: Prevent Fall and Fall Injury  Flowsheets (Taken 9/27/2022 1552)  Safety Promotion/Fall Prevention:   assistive device/personal item within reach   bed alarm set   chair alarm set   Fall Risk reviewed with patient/family   Fall Risk signage in place   gait belt with ambulation   high risk medications identified   in recliner, wheels locked   lighting adjusted   medications reviewed   nonskid shoes/socks when out of bed   side rails raised x 2   instructed to call staff for mobility  Intervention: Prevent Skin Injury  Flowsheets (Taken 9/27/2022 1552)  Skin Protection: incontinence pads utilized  Intervention: Prevent Infection  Flowsheets (Taken 9/27/2022 1552)  Infection Prevention:   environmental surveillance performed   equipment surfaces disinfected   hand hygiene promoted   single patient room provided   rest/sleep promoted   personal protective equipment utilized  Intervention: Prevent VTE (Venous Thromboembolism)  Flowsheets (Taken 9/27/2022 1552)  VTE Prevention/Management:   bleeding risk assessed   bleeding risk factor(s) identified, provider notified   ambulation promoted   bleeding precations maintained  Goal: Optimal Comfort and Wellbeing  Outcome: Ongoing, Progressing  Intervention: Provide Person-Centered Care  Flowsheets (Taken 9/27/2022 1552)  Trust Relationship/Rapport:   care explained   thoughts/feelings acknowledged   emotional support provided   empathic listening provided   questions answered   reassurance provided   choices provided   questions encouraged  Intervention: Monitor Pain and Promote Comfort  Flowsheets (Taken 9/27/2022 1552)  Pain Management Interventions:   medication offered   medication offered but refused   pain  management plan reviewed with patient/caregiver   pillow support provided   position adjusted   premedicated for activity   prescribed exercises encouraged   quiet environment facilitated   relaxation techniques promoted     Problem: Fall Injury Risk  Goal: Absence of Fall and Fall-Related Injury  Outcome: Ongoing, Progressing  Intervention: Identify and Manage Contributors  Flowsheets (Taken 9/27/2022 1552)  Self-Care Promotion:   independence encouraged   BADL personal objects within reach   BADL personal routines maintained   safe use of adaptive equipment encouraged  Medication Review/Management:   medications reviewed   high-risk medications identified  Intervention: Promote Injury-Free Environment  Flowsheets (Taken 9/27/2022 1552)  Safety Promotion/Fall Prevention:   assistive device/personal item within reach   bed alarm set   chair alarm set   Fall Risk reviewed with patient/family   Fall Risk signage in place   gait belt with ambulation   high risk medications identified   in recliner, wheels locked   lighting adjusted   medications reviewed   nonskid shoes/socks when out of bed   side rails raised x 2   instructed to call staff for mobility

## 2022-09-27 NOTE — PT/OT/SLP PROGRESS
Physical Therapy Inpatient Rehab Treatment    Patient Name:  Yoan Xavier   MRN:  [id number]    Recommendations:     Discharge Recommendations:  other (see comments) (pending)   Discharge Equipment Recommendations: walker, rolling   Barriers to discharge: Decreased caregiver support    Assessment:     Yoan Xavier is a 81 y.o. female admitted with a medical diagnosis of Closed displaced midcervical fracture of left femur with routine healing.  She presents with the following impairments/functional limitations:  weakness, impaired endurance, impaired self care skills, impaired functional mobility, gait instability, impaired balance, decreased lower extremity function, decreased safety awareness, pain, decreased ROM, impaired muscle length, orthopedic precautions .    Rehab Diagnosis:     Recent Surgery: * No surgery found *      General Precautions: Standard, fall     Orthopedic Precautions:LLE weight bearing as tolerated, LLE posterior precautions     Braces: N/A    Rehab Prognosis: Good; patient would benefit from acute skilled PT services to address these deficits and reach maximum level of function.      History:     Past Medical History:   Diagnosis Date    COPD (chronic obstructive pulmonary disease)     Depression     Hypertension     Insomnia     Lumbar degenerative disc disease     Mixed hyperlipidemia        Past Surgical History:   Procedure Laterality Date    cyst on tailbone      HEMIARTHROPLASTY, HIP, POSTERIOR APPROACH Left 9/21/2022    Procedure: HEMIARTHROPLASTY, HIP, POSTERIOR APPROACH;  Surgeon: Christos Christensen MD;  Location: [facility];  Service: Orthopedics;  Laterality: Left;    right hip replacement         Subjective     Chief Complaint: none  Patient/Family Comments/goals: to go home      Respiratory Status: Room air    Patients cultural, spiritual, Latter day conflicts given the current situation:        Objective:     Communicated with nursing prior to session.  Patient found up in  chair with Other (comments) (pt in wc)  upon PT entry to room.    Pt is Oriented x3 and Alert and Impulsive.    Functional Mobility:   Transfers:     Sit to Stand:  supervision with rolling walker  Gait: Pt ambulates 100ft 2 trials with RW with vc for proximity within rw .   Pt performed dynamic standing bal with activity washer toss alternating ue toss seated rest breaks including picking up object from floor with use of reacher secondary to left hip pxs     Current   Status  Discharge   Goal   Functional Area: Care Score:    Roll Left and Right   Independent   Sit to Lying   Independent   Lying to Sitting on Side of Bed   Independent   Sit to Stand   Independent   Chair/Bed-to-Chair Transfer   Independent   Car Transfer   Independent   Walk 10 Feet   Independent   Walk 50 Feet with Two Turns   Independent   Walk 150 Feet   Independent   Walk 10 Feet Uneven Surface   Independent   1 Step (Curb)   Independent   4 Steps   Independent   12 Steps   Independent   Picking Up Object 4     Wheel 50 Feet with Two Turns       Wheel 150 Feet           Activity Tolerance    Patient left up in chair with call button in reach, chair alarm on, and pt christa tx well slowly improving with amb christa and decrease assist  .    Education provided: gait training, balance training, and safety awareness    Expected compliance:    GOALS:   Multidisciplinary Problems       Physical Therapy Goals          Problem: Physical Therapy    Goal Priority Disciplines Outcome Goal Variances Interventions   Physical Therapy Goal     PT, PT/OT Ongoing, Progressing     Description: Short Term goals      Bed Mobility   Roll Right and Left Independent .  Lying to sitting Independent .  Sitting to lying Independent .    Transfers    Pt will be able to perform Stand step chair/bed to chair transfer With RW Independent .  Pt will be able to perform Sit to stand with RW Independent .  Pt will be able to perform Car transfer with RW Independent .    Ambulation     Pt will ambulate 500 Feet with RW Independent .  Pt will be able to ascend/descend 12 stairs using 1HR Rails Independent .  Pt will be able to ascend/descend 6 inch curb using RW Independent .  Pt will be able to ambulate uneven surfaces/ramp 10 feet with RW Independent .          Timeframe: By Discharge                          Plan:     During this hospitalization, patient to be seen 5 x/week to address the identified rehab impairments via gait training, therapeutic activities, therapeutic exercises, neuromuscular re-education and progress toward the following goals:    Plan of Care Expires:  09/30/22    Additional Information:         Time Tracking:     PT Received On: 09/27/22  Time In 1130     Time Out 1200  Total Time 30 min  Therapy Time PT Individual: 30  Missed Time:    Time Missed due to:      Billable Minutes: Therapeutic Activity 30min    09/27/2022

## 2022-09-27 NOTE — PLAN OF CARE
Problem: Rehabilitation (IRF) Plan of Care  Goal: Plan of Care Review  Outcome: Ongoing, Progressing  Goal: Patient-Specific Goal (Individualized)  Outcome: Ongoing, Progressing  Goal: Absence of New-Onset Illness or Injury  Outcome: Ongoing, Progressing  Goal: Optimal Comfort and Wellbeing  Outcome: Ongoing, Progressing     Problem: Fall Injury Risk  Goal: Absence of Fall and Fall-Related Injury  Outcome: Ongoing, Progressing

## 2022-09-27 NOTE — PT/OT/SLP PROGRESS
Physical Therapy Inpatient Rehab Treatment    Patient Name:  Yoan Xavier   MRN:  [id number]    Recommendations:     Discharge Recommendations:  other (see comments) (pending)   Discharge Equipment Recommendations: walker, rolling   Barriers to discharge:  impaired functional mobility    Assessment:     Yoan Xavier is a 81 y.o. female admitted with a medical diagnosis of Closed displaced midcervical fracture of left femur with routine healing.  She presents with the following impairments/functional limitations:  weakness, impaired endurance, impaired self care skills, impaired functional mobility, gait instability, decreased lower extremity function, decreased safety awareness, pain, decreased ROM, orthopedic precautions .    General Precautions: Standard, fall     Orthopedic Precautions:LLE weight bearing as tolerated, LLE posterior precautions     Braces: N/A    Rehab Prognosis: Good; patient would benefit from acute skilled PT services to address these deficits and reach maximum level of function.      History:     Past Medical History:   Diagnosis Date    COPD (chronic obstructive pulmonary disease)     Depression     Hypertension     Insomnia     Lumbar degenerative disc disease     Mixed hyperlipidemia        Past Surgical History:   Procedure Laterality Date    cyst on tailbone      HEMIARTHROPLASTY, HIP, POSTERIOR APPROACH Left 9/21/2022    Procedure: HEMIARTHROPLASTY, HIP, POSTERIOR APPROACH;  Surgeon: Christos Christensen MD;  Location: [facility];  Service: Orthopedics;  Laterality: Left;    right hip replacement         Subjective     Chief Complaint: L hip pain    Respiratory Status: Room air    Patients cultural, spiritual, Mandaeism conflicts given the current situation:        Objective:     Communicated with pt prior to session.  Patient found up in chair with    upon PT entry to room.    Pt is Oriented x3 and Alert, Cooperative, and Motivated.    Functional Mobility:   Bed Mobility:     Supine to  Sit: stand by assistance  Sit to Supine: stand by assistance  Transfers:     Sit to Stand:  stand by assistance with rolling walker  Bed to Chair: stand by assistance with  rolling walker  using  Stand Pivot  Gait: Pt ambulates 200' + 175' with RW with Standby Assistance.   Ambulate 150' + 150' feet on uneven surfaces/ramps with rolling walker with Contact Guard Assistance     Current   Status  Discharge   Goal   Functional Area: Care Score:    Roll Left and Right   Independent   Sit to Lying 4 Independent   Lying to Sitting on Side of Bed 4 Independent   Sit to Stand 4 Independent   Chair/Bed-to-Chair Transfer 4 Independent   Car Transfer   Independent   Walk 10 Feet 4 Independent   Walk 50 Feet with Two Turns 4 Independent   Walk 150 Feet 4 Independent   Walk 10 Feet Uneven Surface 4 Independent   1 Step (Curb)   Independent   4 Steps   Independent   12 Steps   Independent   Picking Up Object 4     Wheel 50 Feet with Two Turns       Wheel 150 Feet           Therapeutic Activities and Exercises:  Supine L hamstring, gastroc, hip adductor, and hip flexor stretch.    3x10 bridges in supine + 2x10 B glute sets and LAQs (3lbs R LE and 2lbs L LE) in sitting. Attempted SLR in supine, but pt unable to tolerate d/t pain even with PT providing physical assist.    Activity Tolerance    Patient left up in chair with call button in reach.    Education provided: roles and goals of PT/PTA, transfer training, bed mob, gait training, safety awareness, body mechanics, assistive device, strengthening exercises, and hip precautions    Expected compliance:    GOALS:   Multidisciplinary Problems       Physical Therapy Goals          Problem: Physical Therapy    Goal Priority Disciplines Outcome Goal Variances Interventions   Physical Therapy Goal     PT, PT/OT Ongoing, Progressing     Description: Short Term goals      Bed Mobility   Roll Right and Left Independent .  Lying to sitting Independent .  Sitting to lying Independent  .    Transfers    Pt will be able to perform Stand step chair/bed to chair transfer With RW Independent .  Pt will be able to perform Sit to stand with RW Independent .  Pt will be able to perform Car transfer with RW Independent .    Ambulation    Pt will ambulate 500 Feet with RW Independent .  Pt will be able to ascend/descend 12 stairs using 1HR Rails Independent .  Pt will be able to ascend/descend 6 inch curb using RW Independent .  Pt will be able to ambulate uneven surfaces/ramp 10 feet with RW Independent .          Timeframe: By Discharge                          Plan:     During this hospitalization, patient to be seen 5 x/week to address the identified rehab impairments via gait training, therapeutic activities, therapeutic exercises, neuromuscular re-education and progress toward the following goals:    Plan of Care Expires:  09/30/22    Additional Information:         Time Tracking:     PT Received On:    Time In 0930     Time Out 1100  Total Time 90 min  Therapy Time PT Individual: 90  Missed Time:    Time Missed due to:      Billable Minutes: Gait Training 45 min, Therapeutic Activity 15 min, and Therapeutic Exercise 30 min    09/27/2022

## 2022-09-27 NOTE — PLAN OF CARE
Met with pt then called sister, Namrata, and brother-in-law, Ed, on Ed's phone ([phone]).     Pt seemed to be very anxious in OT this am, telling Leona that her sister moved all of her belongings out of her apartment.  I later spoke with pt and determined that she was referencing what happened approx 1 year ago, not currently.  When pt began falling in her apartment in [place], her sister moved her into [facility] in [place] (until it got to be too expensive).  They then moved her to the assisted living apartment at hospitals in [place] (near sister's home).  Namrata clarified that it IS a true assisted living facility.  They provide dining room meals, do laundry, assist with hygiene/toileting, etc.  It is less expensive for pt and closer to her only family.  She will return there on Monday, 10/3.  Namrata and Ed will come for training and pickup on Monday at 1000.      I called hospitals nurse, Bailey ([phone]).  She will come to do a reassessment of pt on Thurs, 9/29, at 1030 to gauge pt's current level of fx and needs for their facility.  Bailey indicated that they use [facility].  I was able to locate the pharmacy in [place] and will ask NP to send scripts as soon as able.      Bailey relayed that they do not have paperwork/forms that need to be completed by our staff, but we do need to call report to their nurse on discharge day.    Bailey relayed that pt's apartment bathroom is complete with a WIS, grab bars, shower chair, and grab bars near toilet.  Pt has her own RW that she uses there.      Bailey indicated that they most commonly use [facility] for HH needs, so I will send the referral to said agency.

## 2022-09-28 PROCEDURE — 97110 THERAPEUTIC EXERCISES: CPT

## 2022-09-28 PROCEDURE — S4991 NICOTINE PATCH NONLEGEND: HCPCS | Performed by: NURSE PRACTITIONER

## 2022-09-28 PROCEDURE — 97535 SELF CARE MNGMENT TRAINING: CPT

## 2022-09-28 PROCEDURE — 94761 N-INVAS EAR/PLS OXIMETRY MLT: CPT

## 2022-09-28 PROCEDURE — 97116 GAIT TRAINING THERAPY: CPT

## 2022-09-28 PROCEDURE — 11800000 HC REHAB PRIVATE ROOM

## 2022-09-28 PROCEDURE — 63600175 PHARM REV CODE 636 W HCPCS: Performed by: NURSE PRACTITIONER

## 2022-09-28 PROCEDURE — 25000003 PHARM REV CODE 250: Performed by: NURSE PRACTITIONER

## 2022-09-28 PROCEDURE — 97530 THERAPEUTIC ACTIVITIES: CPT

## 2022-09-28 RX ORDER — LACTULOSE 10 G/15ML
20 SOLUTION ORAL ONCE
Status: COMPLETED | OUTPATIENT
Start: 2022-09-28 | End: 2022-09-28

## 2022-09-28 RX ORDER — CETIRIZINE HYDROCHLORIDE 10 MG/1
10 TABLET ORAL DAILY
Status: DISCONTINUED | OUTPATIENT
Start: 2022-09-28 | End: 2022-10-03 | Stop reason: HOSPADM

## 2022-09-28 RX ORDER — FLUTICASONE PROPIONATE 50 MCG
2 SPRAY, SUSPENSION (ML) NASAL 2 TIMES DAILY
Status: DISCONTINUED | OUTPATIENT
Start: 2022-09-28 | End: 2022-10-03 | Stop reason: HOSPADM

## 2022-09-28 RX ADMIN — POLYETHYLENE GLYCOL 3350 17 G: 17 POWDER, FOR SOLUTION ORAL at 08:09

## 2022-09-28 RX ADMIN — CARVEDILOL 12.5 MG: 6.25 TABLET, FILM COATED ORAL at 08:09

## 2022-09-28 RX ADMIN — ASPIRIN 81 MG 81 MG: 81 TABLET ORAL at 08:09

## 2022-09-28 RX ADMIN — LISINOPRIL 20 MG: 10 TABLET ORAL at 08:09

## 2022-09-28 RX ADMIN — LIDOCAINE 5% 1 PATCH: 700 PATCH TOPICAL at 05:09

## 2022-09-28 RX ADMIN — FAMOTIDINE 20 MG: 20 TABLET, FILM COATED ORAL at 08:09

## 2022-09-28 RX ADMIN — TRAMADOL HYDROCHLORIDE 50 MG: 50 TABLET, COATED ORAL at 08:09

## 2022-09-28 RX ADMIN — FAMOTIDINE 20 MG: 20 TABLET ORAL at 08:09

## 2022-09-28 RX ADMIN — ENOXAPARIN SODIUM 40 MG: 40 INJECTION SUBCUTANEOUS at 05:09

## 2022-09-28 RX ADMIN — GABAPENTIN 300 MG: 300 CAPSULE ORAL at 08:09

## 2022-09-28 RX ADMIN — AMLODIPINE BESYLATE 5 MG: 5 TABLET ORAL at 08:09

## 2022-09-28 RX ADMIN — NICOTINE 1 PATCH: 7 PATCH, EXTENDED RELEASE TRANSDERMAL at 08:09

## 2022-09-28 RX ADMIN — METHOCARBAMOL 500 MG: 500 TABLET ORAL at 05:09

## 2022-09-28 RX ADMIN — LACTULOSE 20 G: 20 SOLUTION ORAL at 11:09

## 2022-09-28 RX ADMIN — DOCUSATE SODIUM 100 MG: 100 CAPSULE, LIQUID FILLED ORAL at 08:09

## 2022-09-28 RX ADMIN — ACETAMINOPHEN 500 MG: 500 TABLET ORAL at 11:09

## 2022-09-28 RX ADMIN — MIRTAZAPINE 30 MG: 15 TABLET, FILM COATED ORAL at 08:09

## 2022-09-28 RX ADMIN — ATORVASTATIN CALCIUM 40 MG: 40 TABLET, FILM COATED ORAL at 08:09

## 2022-09-28 RX ADMIN — BUPROPION HYDROCHLORIDE 300 MG: 150 TABLET, FILM COATED, EXTENDED RELEASE ORAL at 08:09

## 2022-09-28 NOTE — PLAN OF CARE
Sent orders for HH PT/OT/nursing to [facility] via [facility] home office (via [address]).

## 2022-09-28 NOTE — PT/OT/SLP PROGRESS
Physical Therapy Inpatient Rehab Treatment    Patient Name:  Yoan Xavier   MRN:  [id number]    Recommendations:     Discharge Recommendations:  other (see comments) (pending)   Discharge Equipment Recommendations: walker, rolling   Barriers to discharge:  impaired functional mobility; decreased safety awareness    Assessment:     Yoan Xavier is a 81 y.o. female admitted with a medical diagnosis of Closed displaced midcervical fracture of left femur with routine healing.  She presents with the following impairments/functional limitations:  weakness, impaired endurance, impaired self care skills, impaired functional mobility, gait instability, decreased lower extremity function, decreased safety awareness, pain, decreased ROM, orthopedic precautions .    General Precautions: Standard, fall     Orthopedic Precautions:LLE weight bearing as tolerated, LLE posterior precautions     Braces: N/A    Rehab Prognosis: Fair; patient would benefit from acute skilled PT services to address these deficits and reach maximum level of function.      History:     Past Medical History:   Diagnosis Date    COPD (chronic obstructive pulmonary disease)     Depression     Hypertension     Insomnia     Lumbar degenerative disc disease     Mixed hyperlipidemia        Past Surgical History:   Procedure Laterality Date    cyst on tailbone      HEMIARTHROPLASTY, HIP, POSTERIOR APPROACH Left 9/21/2022    Procedure: HEMIARTHROPLASTY, HIP, POSTERIOR APPROACH;  Surgeon: Christos Christensen MD;  Location: [facility];  Service: Orthopedics;  Laterality: Left;    right hip replacement         Subjective     Chief Complaint: L LE pain s/p SANDRA    Pain  0/10 at rest  6/10 in L LE with mobility; returned to 0/10 with cessation of activity.    Respiratory Status: Room air    Patients cultural, spiritual, Episcopalian conflicts given the current situation:        Objective:     Communicated with pt prior to session.  Patient found up in chair with    upon  PT entry to room.    Pt is Oriented x3 and  occasional confusion exhibited with pt sometimes forgetting the task at hand, Alert, Cooperative, and Motivated.    Functional Mobility:   Bed Mobility:     Supine to Sit: stand by assistance  Sit to Supine: stand by assistance  Transfers:     Sit to Stand:  modified independence with rolling walker  Bed to Chair: stand by assistance with  rolling walker  using  Stand Pivot  Gait: Pt ambulates 150' + 200' in AM session and then 125' + 200' in PM session with RW with mod I for short distance and SBA for longer distances + cueing for upright posture.   Wheelchair Propulsion:  Pt propelled Standard wheelchair x 225 feet on Level tile with  Bilateral upper extremity with Supervision or Set-up Assistance.      Current   Status  Discharge   Goal   Functional Area: Care Score:    Roll Left and Right   Independent   Sit to Lying 4 Independent   Lying to Sitting on Side of Bed 4 Independent   Sit to Stand 6 Independent   Chair/Bed-to-Chair Transfer 4 Independent   Car Transfer   Independent   Walk 10 Feet 6 Independent   Walk 50 Feet with Two Turns 4 Independent   Walk 150 Feet 4 Independent   Walk 10 Feet Uneven Surface   Independent   1 Step (Curb)   Independent   4 Steps   Independent   12 Steps   Independent   Picking Up Object       Wheel 50 Feet with Two Turns 4     Wheel 150 Feet 4         Therapeutic Activities and Exercises:  L LE hamstring, hip adductor, hip flexor, and quadriceps/rectus femoris stretch    Standing therex: 1x5 + 2x10 each of B LE marches, hip abduction, calf raises, and hip extension with use of // bars for balance. Seated breaks needed between sets.    Activity Tolerance    Patient left up in chair with call button in reach.    Education provided: roles and goals of PT/PTA, transfer training, bed mob, gait training, safety awareness, body mechanics, assistive device, wheelchair management, strengthening exercises, and hip precautions    Expected  compliance:    GOALS:   Multidisciplinary Problems       Physical Therapy Goals          Problem: Physical Therapy    Goal Priority Disciplines Outcome Goal Variances Interventions   Physical Therapy Goal     PT, PT/OT Ongoing, Progressing     Description: Short Term goals      Bed Mobility   Roll Right and Left Independent .  Lying to sitting Independent .  Sitting to lying Independent .    Transfers    Pt will be able to perform Stand step chair/bed to chair transfer With RW Independent .  Pt will be able to perform Sit to stand with RW Independent .  Pt will be able to perform Car transfer with RW Independent .    Ambulation    Pt will ambulate 500 Feet with RW Independent .  Pt will be able to ascend/descend 12 stairs using 1HR Rails Independent .  Pt will be able to ascend/descend 6 inch curb using RW Independent .  Pt will be able to ambulate uneven surfaces/ramp 10 feet with RW Independent .          Timeframe: By Discharge                          Plan:     During this hospitalization, patient to be seen 5 x/week to address the identified rehab impairments via gait training, therapeutic activities, therapeutic exercises, neuromuscular re-education and progress toward the following goals:    Plan of Care Expires:  09/30/22    Additional Information:         Time Tracking:     PT Received On:    Time In  (0830; 1100)     Time Out  (0930; 1200)  Total Time  60 min + 60 min  Therapy Time PT Individual: 120  Missed Time:    Time Missed due to:      Billable Minutes: Gait Training 45 min, Therapeutic Activity 15 min, and Therapeutic Exercise 60 min    09/28/2022

## 2022-09-28 NOTE — PROGRESS NOTES
09/28/22 1000   Rec Therapy Time Calculation   Date of Treatment 09/28/22   Rec Start Time 1000   Rec Stop Time 1030   Rec Total Time (min) 30 min   Time   Treatment time 2 units   Precautions   General Precautions fall   Orthopedic Precautions  N/A   Braces N/A   Pain/Comfort   Pain Rating 1 no pain   OTHER   Rehab identified problem list/impairments gait instability;impaired endurance;decreased lower extremity function;decreased safety awareness   Values/Beliefs/Spiritual Care   Spiritual, Cultural Beliefs, Jainism Practices, Values that Affect Care no   Overall Level of Functioning   Activity Tolerance Standby Assist   Dynamic Sitting Balance/Reaching Does not occur   Dynamic Standing Balance/Reaching Standby Assist   Right UE Coodination/Dexterity Independent   Left UE Coordination/Dexterity Independent   Problem Solving/Sequencing Skills Standby Assist   Memory Recall Standby Assist   Attention Span Standby Assist   Recreational Therapy Short Term Goals   Short Term Goal 1 Progression Met   Short Term Goal 2 Progression Met   Recreational Therapy Long Term Goals   Long Term Goal 1 Progression Progressing   Long Term Goal 2 Progression Progressing   Plan   Patient to be seen Daily   Planned Duration 1 week   Treatments Planned Energy conservation training;Safety education   Treatment plan/goals estblished with Patient/Caregiver Yes

## 2022-09-28 NOTE — PT/OT/SLP PROGRESS
"Recreational Therapy Treatment    Date of Treatment: 09/28/22  Start Time: 1000  Stop Time: 1030  Total Time: 30 min  Missed Time:    General Precautions: fall  Ortho Precautions: N/A  Braces: N/A    Vitals   Vitals at Rest  BP    HR    O2 Sat    Pain      Vitals With Activity  BP    HR    O2 Sat    Pain        Treatment     Cognitive Skills Building   Cognitive Observation Activity Assist Position Equipment Response            Comment:          Dynamic Activities   Activity Assist Position Equipment Response   Activity 1 Bocce ball supervision Standing Rolling walker and Bocce balls good   Comment: Sit to stand was supervision as was dynamic standing balance/reaching. Standing tolerance was 3 minutes. UE coordination was I. Problem solving skills were supervision. Cooperative       Fine Motor Activities   Activity Assist Position Equipment Response           Comment:          Additional Info: Recliner to w/c transfer was supervision. Pt was once again educated on the purpose and benefits for Recreational Therapy due to stating that "Games" were not her cup of tea.    Goals     Short Term Goals    Goal  Goal Status   Will increase activity tolerance to settup Met   Will improve dynamic standing balance/reaching to supervision Met                 Long Term Goals    Goal Goal Status   Will increase standing tolerance to 5,10 minutes Progressing   Will improve dynamic standng balance/reaching to setup Progressing                         "

## 2022-09-28 NOTE — PLAN OF CARE
09/28/22 0500        Incision/Site 09/21/22 1352 Left Hip   Date First Assessed/Time First Assessed: 09/21/22 1352   Side: Left  Location: Hip   Dressing Appearance Dry;Intact   Drainage Amount None   Appearance Staples intact   Wound Edges Approximated   Dressing Changed;Island/border

## 2022-09-28 NOTE — PROGRESS NOTES
[facility] ([facility])  Rehab Progress Note    Patient Name: Yoan Xavier  MRN: [id number]  Age: 81 y.o. Sex: female  : 1940  Hospital Length of Stay: 5 days  Date of Service: 2022   Chief Complaint: Left femoral neck fracture 2/2 fall s/p left hip hemiarthroplasty on 2022    Subjective:     Basic Information  Admit Information: 81-year-old WF presented to [facility] on  complaints of left hip pain after falling doing laundry twelve days prior.  PMH significant for hypertension and hyperlipidemia.  Workup significant for left femoral neck fracture.  On  tolerated left hemiarthroplasty without perioperative complications. Tolerated transfer to [facility] inpatient rehab unit on  without incident.  Today's Information: No acute events overnight.  Sitting in chair.  Reports good sleep and appetite.  Last BM 4 days ago.  Complains of congestion and nasal drip.  Vital signs at goal with no recorded fevers.  No labs or imaging today.    Review of patient's allergies indicates:  No Known Allergies     Current Facility-Administered Medications:     acetaminophen tablet 500 mg, 500 mg, Oral, Q4H, Olga Herbert, XOCHILTP, 500 mg at 22 1155    amLODIPine tablet 5 mg, 5 mg, Oral, Daily, Olga Herbert, XOCHILTP, 5 mg at 22    aspirin chewable tablet 81 mg, 81 mg, Oral, BID, Olga Herbert, FNP, 81 mg at 22    atorvastatin tablet 40 mg, 40 mg, Oral, Daily, Olga Herbert, XOCHILTP, 40 mg at 22    benzonatate capsule 100 mg, 100 mg, Oral, TID PRN, Olga Herbert FNAMY    buPROPion TB24 tablet 300 mg, 300 mg, Oral, Daily, Olga Herbert FNP, 300 mg at 22    carvediloL tablet 12.5 mg, 12.5 mg, Oral, BID, Olga Herbert FNP, 12.5 mg at 22    cetirizine tablet 10 mg, 10 mg, Oral, Daily, Robert A Chuy, FNP    docusate sodium capsule 100 mg, 100 mg, Oral, BID, WILFRID Cotter, 100 mg at 22 0849     enoxaparin injection 40 mg, 40 mg, Subcutaneous, Daily, WILFRID Cotter, 40 mg at 09/27/22 1710    famotidine tablet 20 mg, 20 mg, Oral, BID, XOCHILT CotterP, 20 mg at 09/28/22 0800    famotidine tablet 20 mg, 20 mg, Oral, BID PRN, XOCHILT CotterP, 20 mg at 09/28/22 0826    fluticasone furoate-vilanteroL 200-25 mcg/dose diskus inhaler 1 puff, 1 puff, Inhalation, Daily, 1 puff at 09/27/22 0903 **AND** MDI Daily, , , Daily, WILFRID Cotter    fluticasone propionate 50 mcg/actuation nasal spray 100 mcg, 2 spray, Each Nostril, BID, WILFRID Penaloza    gabapentin capsule 300 mg, 300 mg, Oral, QHS, XOCHILT CotterP, 300 mg at 09/27/22 2054    hydrALAZINE injection 10 mg, 10 mg, Intravenous, Q6H PRN, XOCHILT CotterP, 10 mg at 09/24/22 0456    hydrOXYzine pamoate capsule 50 mg, 50 mg, Oral, Nightly PRN, WILFRID Cotter, 50 mg at 09/25/22 2006    labetalol 20 mg/4 mL (5 mg/mL) IV syring, 10 mg, Intravenous, Q4H PRN, WILFRID Cotter    LIDOcaine 5 % patch 1 patch, 1 patch, Transdermal, Q24H, WILFRID Roblero, 1 patch at 09/28/22 0505    lisinopriL tablet 20 mg, 20 mg, Oral, Daily, XOCHILT PenalozaP, 20 mg at 09/28/22 0826    methocarbamoL tablet 500 mg, 500 mg, Oral, TID, Tanisha Painter FNP, 500 mg at 09/28/22 0504    methocarbamoL tablet 750 mg, 750 mg, Oral, QID PRN, WILFRID Cotter    metoprolol injection 10 mg, 10 mg, Intravenous, Q2H PRN, WILFRID Cotter    mirtazapine tablet 30 mg, 30 mg, Oral, QHS, WILFRID Cotter, 30 mg at 09/27/22 2054    nitroGLYCERIN SL tablet 0.4 mg, 0.4 mg, Sublingual, Q5 Min PRN, WILFRID Cotter    ondansetron disintegrating tablet 4 mg, 4 mg, Oral, Q6H PRN, WILFRID Cotter    polyethylene glycol packet 17 g, 17 g, Oral, BID, WILFRID Cotter, 17 g at 09/28/22 0826    traMADoL tablet 50 mg, 50 mg, Oral, Q4H PRN, WILFRID Cotter, 50 mg at 09/28/22 0827     Review of Systems   Complete  "12-point review of symptoms negative except for what's mentioned in HPI     Objective:     BP (!) 119/55   Pulse 61   Temp 97.5 °F (36.4 °C)   Resp 18   Ht 5' 8" (1.727 m)   Wt 67.7 kg (149 lb 4 oz)   SpO2 (!) 93%   BMI 22.69 kg/m²        Intake/Output Summary (Last 24 hours) at 9/28/2022 1457  Last data filed at 9/28/2022 1300  Gross per 24 hour   Intake 720 ml   Output 1 ml   Net 719 ml       Physical Exam  Constitutional:       Appearance: Normal appearance.   HENT:      Head: Normocephalic.      Mouth/Throat:      Mouth: Mucous membranes are moist.   Eyes:      Pupils: Pupils are equal, round, and reactive to light.   Cardiovascular:      Rate and Rhythm: Normal rate and regular rhythm.      Heart sounds: Normal heart sounds.   Pulmonary:      Effort: Pulmonary effort is normal.      Breath sounds: Normal breath sounds.   Abdominal:      General: Bowel sounds are normal.      Palpations: Abdomen is soft.   Musculoskeletal:      Cervical back: Neck supple.      Comments: Generalized weakness and muscle atrophy.  Left hip dressing clean and intact.   Skin:     General: Skin is warm and dry.   Neurological:      General: No focal deficit present.      Mental Status: She is alert and oriented to person, place, and time.   Psychiatric:         Mood and Affect: Mood normal.         Behavior: Behavior normal.         Thought Content: Thought content normal.         Judgment: Judgment normal.   *MD performed and documented physical examination       Lines/Drains/Airways       Epidural Line  Duration                  Neuraxial Analgesia/Anesthesia Assessment (using dermatomes) Epidural 09/21/22 1236 7 days         Neuraxial Analgesia/Anesthesia Assessment (using dermatomes) Epidural 09/21/22 1236 7 days                  Labs  No results found for this or any previous visit (from the past 24 hour(s)).    Radiology  Left hip x-ray 9/22:  Good position of left hip prosthesis    Assessment/Plan:     81 y.o. WM " admitted on 9/23/2022    Left femoral neck fracture 2/2 fall   - s/p left hip hemiarthroplasty on 09/21/2022  - LLE WBAT  - continue                Aspirin 81 mg b.i.d.                 Methocarbamol 750 mg 4 times daily p.r.n. muscle spasms                 Gabapentin 300 mg at bedtime                Tylenol 500 mg every 4 hours                 Tramadol 50 mg every 4 hours p.r.n.  - follow-up outpatient with orthopedic surgery     GERD  - Avoid spicy foods, and nothing to eat or drink within x2 hours of bedtime or laying flat (water is ok)   - Avoid NSAIDs (Advil, ibuprofen, naproxen...) and resendiz-2 inhibitors (Mobic, Celebrex)    - continue                Pepcid 20 mg b.i.d.     HTN  - BP mod control!!  - continue                Lisinopril 20 mg daily (increased 9/24)                Amlodipine 5 mg daily                 Coreg 12.5 mg b.i.d.                Hydralazine 10 mg every 2 hours as needed for BP > 160/90                Labetalol 10 mg every 2 hours as needed for BP > 160/90     HLD  - FLP with next labs  - continue                Atorvastatin 40 mg daily     Major depressive disorder  - In remission   - Continue                Wellbutrin 300 mg daily      Constipation  - Stable   - Continue                 MiraLax 17 g b.i.d. (increased 9/27)                Colace 100 mg b.i.d.     Insomnia   - Stable   - Continue                Mirtazapine 30 mg nightly      COPD   - Stable   - Continue                Symbicort 2 puffs daily     Tobacco abuse   - Current  - Smokes 1 pack of cigarettes daily  - discontinued Nicotine patch on 9/28  - Counseled on smoking cessation     Normocytic anemia  - asymptomatic  - H/H stable   - no evidence of active bleeds  - will closely monitor and transfuse if needed     Sinus congestion   - current  - initiate   Zyrtec 10 mg daily (initiated 9/28   Flonase 100 mcg b.i.d. (initiated 9/28)     VTE Prophylaxis:  Lovenox 40 mg daily  COVID-19 testing: Negative 9/23/2022  COVID-19  vaccination status:  Unvaccinated     POA: no  Living will: no  Contacts: Namrata Yu (sister) [phone]                     Ed Gigi (brother-in-law) [phone]     CODE STATUS: Full  Internal Medicine (attending): Vasiliy Connell MD  Physiatry (consulting):  Vernon Manrique MD     OUTPATIENT PROVIDERS  PCP: Ronal Zuluaga MD  Orthopedic surgery:  Christos Christensen MD     DISPOSITION: Condition stable.  Sleep hygiene, and appetite at goal.  Last BM 9/23.  Received lactulose this morning.  If no BM by this afternoon initiate suppository or soapsuds enema.  Vital signs at goal with no recorded fevers.  No labs or imaging today.  Discontinue nicotine patch.  Initiate Zyrtec 10 mg daily and Flonase 100 mcg b.i.d. to help with sinus congestion.  Monitor closely.  Notify of acute changes.    Staffing 9/27: Continent of bowel and bladder. Good appetite. RT: overall supervision. PT: overall supervision. OT: maintains precautions, overall supervision to partial mod. Projected discharge 10/3.    Amrik Vera NP conducted independent physical examination and assisted with medical documentation.    Total time spent on this encounter including chart review and direct MD + NP 1-on-1 patient interaction: 41 minutes   Over 50% of this time was spent in counseling and coordination of care

## 2022-09-28 NOTE — PT/OT/SLP PROGRESS
"Occupational Therapy Inpatient Rehab Treatment    Name: Yoan Xavier  MRN: [id number]    Assessment:  Yoan Xavier is a 81 y.o. female admitted with a medical diagnosis of Closed displaced midcervical fracture of left femur with routine healing.  She presents with the following impairments/functional limitations:  impaired self care skills, impaired functional mobility .    General Precautions: Standard, fall     Orthopedic Precautions:LLE weight bearing as tolerated, LLE posterior precautions     Braces: N/A    Rehab Prognosis: Good; patient would benefit from acute skilled OT services to address these deficits and reach maximum level of function.      History:     Past Medical History:   Diagnosis Date    COPD (chronic obstructive pulmonary disease)     Depression     Hypertension     Insomnia     Lumbar degenerative disc disease     Mixed hyperlipidemia        Past Surgical History:   Procedure Laterality Date    cyst on tailbone      HEMIARTHROPLASTY, HIP, POSTERIOR APPROACH Left 9/21/2022    Procedure: HEMIARTHROPLASTY, HIP, POSTERIOR APPROACH;  Surgeon: Christos Christensen MD;  Location: [facility];  Service: Orthopedics;  Laterality: Left;    right hip replacement         Subjective     Orientation: Oriented x4    Chief Complaint: "I need to brush my teeth"    Patient/Family Comments/goals: "I guess I will go back to apartment"  Respiratory Status: Room air    Patients cultural, spiritual, Jew conflicts given the current situation: no       Objective:     Patient found up in chair with   call bell upon OT entry to room.    Mobility   Patient completed:  Sit to Stand Transfer with minimum assistance with rolling walker  Stand to Sit Transfer with contact guard assistance with rolling walker  Tub Transfer Stand Pivot technique with minimum assistance with rolling walker and grab bars    Functional Mobility  Min A to ambulate to bathroom    ADLs   Current Status   Eating 6   Oral Hygiene 6   Shower, Bathe " Self 3   Upper Body Dressing 5   Lower Body Dressing 4   Toileting Hygiene 4   Toilet Transfer     Putting On, Taking Off Footwear 4     Limiting Factors for ADLs:  THPx      Therapeutic Exercise  B UE AROM via red T band through all joints/planes of B Ue's x's 20 reps           LifeStyle Change and Education:             Patient left up in chair with call button in reach, chair alarm on, and CHIQUITA Figueroa present.     Education provided: ADLs, transfer training, body mechanics, assistive device, safety precautions, fall prevention, and post-op precautions    Multidisciplinary Problems       Occupational Therapy Goals          Problem: Occupational Therapy    Goal Priority Disciplines Outcome Interventions   Occupational Therapy Goal     OT, PT/OT                         Time Tracking     OT Received On: 09/28/22  Time In 0730     Time Out 0830  Total Time 60 min  Therapy Time: OT Individual: 60  Missed Time:  0  Missed Time Reason:      Billable Minutes: Self Care/Home Management 45 and Therapeutic Exercise 15    09/28/2022

## 2022-09-29 PROCEDURE — 63600175 PHARM REV CODE 636 W HCPCS: Performed by: NURSE PRACTITIONER

## 2022-09-29 PROCEDURE — 97116 GAIT TRAINING THERAPY: CPT | Mod: CQ

## 2022-09-29 PROCEDURE — 97535 SELF CARE MNGMENT TRAINING: CPT

## 2022-09-29 PROCEDURE — 25000003 PHARM REV CODE 250: Performed by: NURSE PRACTITIONER

## 2022-09-29 PROCEDURE — 97110 THERAPEUTIC EXERCISES: CPT

## 2022-09-29 PROCEDURE — 99233 PR SUBSEQUENT HOSPITAL CARE,LEVL III: ICD-10-PCS | Mod: ,,, | Performed by: PHYSICAL MEDICINE & REHABILITATION

## 2022-09-29 PROCEDURE — 99233 SBSQ HOSP IP/OBS HIGH 50: CPT | Mod: ,,, | Performed by: PHYSICAL MEDICINE & REHABILITATION

## 2022-09-29 PROCEDURE — 97164 PT RE-EVAL EST PLAN CARE: CPT

## 2022-09-29 PROCEDURE — 97530 THERAPEUTIC ACTIVITIES: CPT

## 2022-09-29 PROCEDURE — 97530 THERAPEUTIC ACTIVITIES: CPT | Mod: CQ

## 2022-09-29 PROCEDURE — 97110 THERAPEUTIC EXERCISES: CPT | Mod: CQ

## 2022-09-29 PROCEDURE — 97168 OT RE-EVAL EST PLAN CARE: CPT

## 2022-09-29 PROCEDURE — 11800000 HC REHAB PRIVATE ROOM

## 2022-09-29 RX ORDER — METHOCARBAMOL 500 MG/1
500 TABLET, FILM COATED ORAL 3 TIMES DAILY
Qty: 30 TABLET | Refills: 0 | Status: SHIPPED | OUTPATIENT
Start: 2022-09-29 | End: 2022-10-09

## 2022-09-29 RX ORDER — NAPROXEN SODIUM 220 MG/1
81 TABLET, FILM COATED ORAL 2 TIMES DAILY
Qty: 84 TABLET | Refills: 0 | Status: SHIPPED | OUTPATIENT
Start: 2022-09-29 | End: 2022-11-10

## 2022-09-29 RX ORDER — CARVEDILOL 12.5 MG/1
12.5 TABLET ORAL 2 TIMES DAILY
Qty: 60 TABLET | Refills: 0 | Status: SHIPPED | OUTPATIENT
Start: 2022-09-29

## 2022-09-29 RX ORDER — BUPROPION HYDROCHLORIDE 300 MG/1
300 TABLET ORAL DAILY
Qty: 30 TABLET | Refills: 0 | Status: SHIPPED | OUTPATIENT
Start: 2022-09-29

## 2022-09-29 RX ORDER — MIRTAZAPINE 30 MG/1
30 TABLET, FILM COATED ORAL NIGHTLY
Qty: 30 TABLET | Refills: 0 | Status: SHIPPED | OUTPATIENT
Start: 2022-09-29

## 2022-09-29 RX ORDER — ATORVASTATIN CALCIUM 40 MG/1
40 TABLET, FILM COATED ORAL DAILY
Qty: 30 TABLET | Refills: 0 | Status: SHIPPED | OUTPATIENT
Start: 2022-09-29

## 2022-09-29 RX ORDER — CETIRIZINE HYDROCHLORIDE 10 MG/1
10 TABLET ORAL DAILY
Qty: 30 TABLET | Refills: 0 | Status: SHIPPED | OUTPATIENT
Start: 2022-09-30 | End: 2023-09-30

## 2022-09-29 RX ORDER — TRAMADOL HYDROCHLORIDE 50 MG/1
50 TABLET ORAL EVERY 6 HOURS PRN
Qty: 12 TABLET | Refills: 0 | Status: SHIPPED | OUTPATIENT
Start: 2022-09-29 | End: 2022-10-06

## 2022-09-29 RX ORDER — FLUTICASONE PROPIONATE 50 MCG
2 SPRAY, SUSPENSION (ML) NASAL 2 TIMES DAILY
Qty: 5 ML | Refills: 0 | Status: SHIPPED | OUTPATIENT
Start: 2022-09-29

## 2022-09-29 RX ORDER — GABAPENTIN 300 MG/1
300 CAPSULE ORAL NIGHTLY
Qty: 30 CAPSULE | Refills: 0 | Status: SHIPPED | OUTPATIENT
Start: 2022-09-29 | End: 2023-09-29

## 2022-09-29 RX ORDER — QUINAPRIL 40 MG/1
20 TABLET ORAL DAILY
Qty: 30 TABLET | Refills: 0 | Status: SHIPPED | OUTPATIENT
Start: 2022-09-29

## 2022-09-29 RX ORDER — AMLODIPINE BESYLATE 5 MG/1
5 TABLET ORAL DAILY
Qty: 30 TABLET | Refills: 0 | Status: SHIPPED | OUTPATIENT
Start: 2022-09-29

## 2022-09-29 RX ORDER — FLUTICASONE FUROATE AND VILANTEROL 200; 25 UG/1; UG/1
1 POWDER RESPIRATORY (INHALATION) DAILY
Qty: 1 EACH | Refills: 0 | Status: SHIPPED | OUTPATIENT
Start: 2022-09-30

## 2022-09-29 RX ORDER — FAMOTIDINE 20 MG/1
20 TABLET, FILM COATED ORAL 2 TIMES DAILY
Qty: 60 TABLET | Refills: 0 | Status: SHIPPED | OUTPATIENT
Start: 2022-09-29 | End: 2023-09-29

## 2022-09-29 RX ADMIN — CETIRIZINE HYDROCHLORIDE 10 MG: 10 TABLET, FILM COATED ORAL at 09:09

## 2022-09-29 RX ADMIN — LISINOPRIL 20 MG: 10 TABLET ORAL at 09:09

## 2022-09-29 RX ADMIN — CARVEDILOL 12.5 MG: 6.25 TABLET, FILM COATED ORAL at 09:09

## 2022-09-29 RX ADMIN — METHOCARBAMOL 500 MG: 500 TABLET ORAL at 05:09

## 2022-09-29 RX ADMIN — ENOXAPARIN SODIUM 40 MG: 40 INJECTION SUBCUTANEOUS at 05:09

## 2022-09-29 RX ADMIN — AMLODIPINE BESYLATE 5 MG: 5 TABLET ORAL at 09:09

## 2022-09-29 RX ADMIN — FAMOTIDINE 20 MG: 20 TABLET ORAL at 09:09

## 2022-09-29 RX ADMIN — ACETAMINOPHEN 500 MG: 500 TABLET ORAL at 05:09

## 2022-09-29 RX ADMIN — GABAPENTIN 300 MG: 300 CAPSULE ORAL at 09:09

## 2022-09-29 RX ADMIN — ASPIRIN 81 MG 81 MG: 81 TABLET ORAL at 09:09

## 2022-09-29 RX ADMIN — DOCUSATE SODIUM 100 MG: 100 CAPSULE, LIQUID FILLED ORAL at 09:09

## 2022-09-29 RX ADMIN — POLYETHYLENE GLYCOL 3350 17 G: 17 POWDER, FOR SOLUTION ORAL at 09:09

## 2022-09-29 RX ADMIN — MIRTAZAPINE 30 MG: 15 TABLET, FILM COATED ORAL at 09:09

## 2022-09-29 RX ADMIN — LIDOCAINE 5% 1 PATCH: 700 PATCH TOPICAL at 05:09

## 2022-09-29 RX ADMIN — ATORVASTATIN CALCIUM 40 MG: 40 TABLET, FILM COATED ORAL at 09:09

## 2022-09-29 RX ADMIN — METHOCARBAMOL 500 MG: 500 TABLET ORAL at 03:09

## 2022-09-29 RX ADMIN — BUPROPION HYDROCHLORIDE 300 MG: 150 TABLET, FILM COATED, EXTENDED RELEASE ORAL at 09:09

## 2022-09-29 RX ADMIN — ACETAMINOPHEN 500 MG: 500 TABLET ORAL at 03:09

## 2022-09-29 NOTE — PROGRESS NOTES
09/29/22 1000   Rec Therapy Time Calculation   Date of Treatment 09/29/22   Rec Start Time 1000   Rec Stop Time 1030   Rec Total Time (min) 30 min   Time   Treatment time 2 units   Precautions   General Precautions fall   Orthopedic Precautions  N/A   Braces N/A   Pain/Comfort   Pain Rating 1 no pain   OTHER   Rehab identified problem list/impairments weakness;decreased safety awareness;decreased lower extremity function;impaired endurance   Values/Beliefs/Spiritual Care   Spiritual, Cultural Beliefs, Yarsani Practices, Values that Affect Care no   Overall Level of Functioning   Activity Tolerance Independent   Dynamic Sitting Balance/Reaching Does not occur   Dynamic Standing Balance/Reaching Independent   Right UE Coodination/Dexterity Independent   Left UE Coordination/Dexterity Independent   Problem Solving/Sequencing Skills Mod Indep   Memory Recall Mod Indep   Attention Span Mod Indep   Recreational Therapy Short Term Goals   Short Term Goal 1 Progression Met   Short Term Goal 2 Progression Met   Recreational Therapy Long Term Goals   Long Term Goal 1 Progression Progressing   Long Term Goal 2 Progression Progressing   Plan   Patient to be seen Daily   Planned Duration Other (Comment)  (3 days)   Treatments Planned Energy conservation training   Treatment plan/goals estblished with Patient/Caregiver Yes

## 2022-09-29 NOTE — PT/OT/SLP PROGRESS
Recreational Therapy Treatment    Date of Treatment: 09/29/22  Start Time: 1000  Stop Time: 1030  Total Time: 30 min  Missed Time:     General Precautions: fall  Ortho Precautions: LLE weight bearing as tolerated, LLE posterior precautions  Braces: N/A    Vitals   Vitals at Rest  BP    HR    O2 Sat    Pain      Vitals With Activity  BP    HR    O2 Sat    Pain        Treatment     Cognitive Skills Building   Cognitive Observation Activity Assist Position Equipment Response            Comment:          Dynamic Activities   Activity Assist Position Equipment Response   Activity 1 Golf independence Standing Golf club, golf balls good   Comment: Sit to stand was I as was dynamic standing balance/reaching. Standing tolerance was 4 minutes. UE coordination and problem solving skills were I. More willing to participate today       Fine Motor Activities   Activity Assist Position Equipment Response           Comment:          Additional Info: Pt increase standing tolerance to 4 minutes    Goals     Short Term Goals    Goal  Goal Status   Will increase activity tolerance to settup Met   Will improve dynamic standing balance/reaching to supervision Met                 Long Term Goals    Goal Goal Status   Will increase standing tolerance to 5,10 minutes Progressing   Will improve dynamic standng balance/reaching to setup Progressing

## 2022-09-29 NOTE — PROGRESS NOTES
Subjective:  HPI:  82 yo female with PMH of Hypertension and Mixed hyperlipidemia presented to [facility] ED on 9/20/2022 with a primary complaint of hip pain.  She tripped in home 12 days ago, and states that she felt left hip pain but was able to walk around and do her daily activities using her walker.  She saw her PCP in [place] about a week ago and xray showed hip fracture but she decided to see if it would heal on its own.  Pain persisted and increased therfore she presented to ED.  Diagnosed with a left femoral neck fracture.  Ortho consulted. Underwent left hip hemiarthroplasty on 9/21. Participating with therapy. Functional status includes bed mobility and transfers requiring minimal assistance. Amb w/RW for 10ft and 20ft with CGA. Patient was evaluated, accepted, and admitted to inpatient rehab to improve functional status. Transferred to [facility] on 9/23 without incident.      9/29: Seen with PT, Amb w/RW. BLE externally rotated noted. Tolerating therapy without complaint. VSSAF with noted SBP elevation. IM following.     Review of Systems  Psychiatric:  Hx mental health/substance abuse: Pt. Reports history of depression a long time ago but not recently. She is a smoker.    Depression/Anxiety:    buPROPion TB24 tablet 300 mg qd  mirtazapine tablet 30 mg qHS  Pain: left hip-controlled  acetaminophen tablet 500 mg q4hr   gabapentin capsule 300 mg qHS  methocarbamoL tablet 750 mg QID PRN spasm  traMADoL tablet 50 mg q4hr PRN pain  Bowels/Bladder: last BM 9/28 suppository 9/27  Appetite: good    Sleep: good      mirtazapine tablet 30 mg qHS    Physical Exam  General: well-developed, well-nourished, in no acute distress  Respiratory: equal chest rise, no SOB, no audible wheeze  Cardiovascular: regular rate and rhythm, no edema  Gastrointestinal: soft, non-tender, non-distended   Musculoskeletal: decreased ROM/strength to LLE  Integumentary: no rashes or skin lesions present, left hip  incision-dressing-c/d/i  Neurologic: cranial nerves intact, no signs of peripheral neurological deficit, motor/sensory function intact  *MD performed and documented physical examination       Assessment/Plan  Hospital   Closed displaced midcervical fracture of left femur     Non-Hospital   Hypertension (Chronic)   Mixed hyperlipidemia (Chronic)   COPD (chronic obstructive pulmonary disease) (Chronic)   Depression (Chronic)   Lumbar degenerative disc disease (Chronic)       Wounds: left hip incision-dressing-c/d/i  S/p left hip hemiarthroplasty on 9/21  Precautions: WBAT LLE  Bracing/AD: RW  Swallowing: Regular Diet  Function: Tolerating therapy. Continue PT/OT  VTE Prophylaxis:   enoxaparin injection 40 mg SubQ q24hr  Code Status: FULL CODE   Discharge: Lives alone at [facility] in [place]. No steps to enter. Completed high school. No  history. 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  Pt. Is single. Never . She lives alone in assisted living and is very independent. Children (0).  Date 10/3 Monday.     Dos 9/29/22  Patient seen in PT gym today  Participation and progress toward goals noted  Continues working on strength, mobility, balance and increasing endurance  Progress and problems discussed with patient and therapists  Questions answered  Chart reviewed and discussed with Dr murillo and medicine team as well as Nena Painter, my FNP  Continue present management                      Tanisha Painter NP, conducted additional independent physical examination and assisted with medical documentation.

## 2022-09-29 NOTE — PROGRESS NOTES
[facility] ([facility])  Rehab Progress Note    Patient Name: Yoan Xavier  MRN: [id number]  Age: 81 y.o. Sex: female  : 1940  Hospital Length of Stay: 6 days  Date of Service: 2022   Chief Complaint: Left femoral neck fracture 2/2 fall s/p left hip hemiarthroplasty on 2022    Subjective:     Basic Information  Admit Information: 81-year-old WF presented to [facility] on  complaints of left hip pain after falling doing laundry twelve days prior.  PMH significant for hypertension and hyperlipidemia.  Workup significant for left femoral neck fracture.  On  tolerated left hemiarthroplasty without perioperative complications. Tolerated transfer to [facility] inpatient rehab unit on  without incident.  Today's Information: No acute events overnight.  Sitting in chair comfortably working with therapy in PT gym.  Last BM .  Reports good sleep and appetite.  Vital signs at goal with no recent recorded fevers.  No labs or imaging today.    Review of patient's allergies indicates:  No Known Allergies     Current Facility-Administered Medications:     acetaminophen tablet 500 mg, 500 mg, Oral, Q4H, Olga Herbert, FNP, 500 mg at 22 0512    amLODIPine tablet 5 mg, 5 mg, Oral, Daily, Olga Herbert, FNP, 5 mg at 22    aspirin chewable tablet 81 mg, 81 mg, Oral, BID, Olga Herbert, FNP, 81 mg at 22    atorvastatin tablet 40 mg, 40 mg, Oral, Daily, Olga Herbert, FNP, 40 mg at 2216    benzonatate capsule 100 mg, 100 mg, Oral, TID PRN, Olga Herbert, FNP    buPROPion TB24 tablet 300 mg, 300 mg, Oral, Daily, Olga Herbert, FNP, 300 mg at 22    carvediloL tablet 12.5 mg, 12.5 mg, Oral, BID, Olga Herbert, FNP, 12.5 mg at 22    cetirizine tablet 10 mg, 10 mg, Oral, Daily, Robert Vera FNP, 10 mg at 22 0917    docusate sodium capsule 100 mg, 100 mg, Oral, BID, Olga Herbert, FNP, 100  mg at 09/28/22 0827    enoxaparin injection 40 mg, 40 mg, Subcutaneous, Daily, WILFRID Cotter, 40 mg at 09/28/22 1704    famotidine tablet 20 mg, 20 mg, Oral, BID, XOCHILT CotterP, 20 mg at 09/29/22 0917    famotidine tablet 20 mg, 20 mg, Oral, BID PRN, WILFRID Cotter, 20 mg at 09/28/22 0826    fluticasone furoate-vilanteroL 200-25 mcg/dose diskus inhaler 1 puff, 1 puff, Inhalation, Daily, 1 puff at 09/27/22 0903 **AND** MDI Daily, , , Daily, WILFRID Cotter    fluticasone propionate 50 mcg/actuation nasal spray 100 mcg, 2 spray, Each Nostril, BID, XOCHILT PenalozaP    gabapentin capsule 300 mg, 300 mg, Oral, QHS, XOCHILT CotterP, 300 mg at 09/28/22 2032    hydrALAZINE injection 10 mg, 10 mg, Intravenous, Q6H PRN, XOCHILT CotterP, 10 mg at 09/24/22 0456    hydrOXYzine pamoate capsule 50 mg, 50 mg, Oral, Nightly PRN, WILFRID Cotter, 50 mg at 09/25/22 2006    labetalol 20 mg/4 mL (5 mg/mL) IV syring, 10 mg, Intravenous, Q4H PRN, WILFRID Cotter    LIDOcaine 5 % patch 1 patch, 1 patch, Transdermal, Q24H, WILFRID Roblero, 1 patch at 09/29/22 0512    lisinopriL tablet 20 mg, 20 mg, Oral, Daily, XOCHILT PenalozaP, 20 mg at 09/29/22 0917    methocarbamoL tablet 500 mg, 500 mg, Oral, TID, Tanisha Painter FNP, 500 mg at 09/29/22 0512    methocarbamoL tablet 750 mg, 750 mg, Oral, QID PRN, WILFRID Cotter    metoprolol injection 10 mg, 10 mg, Intravenous, Q2H PRN, WILFRID Cotter    mirtazapine tablet 30 mg, 30 mg, Oral, QHS, WILFRID Cotter, 30 mg at 09/28/22 2032    nitroGLYCERIN SL tablet 0.4 mg, 0.4 mg, Sublingual, Q5 Min PRN, WILFRID Cotter    ondansetron disintegrating tablet 4 mg, 4 mg, Oral, Q6H PRN, WILFRID Cotter    polyethylene glycol packet 17 g, 17 g, Oral, BID, WILFRID Cotter, 17 g at 09/28/22 2032    traMADoL tablet 50 mg, 50 mg, Oral, Q4H PRN, WILFRID Cotter, 50 mg at 09/28/22 0827     Review  "of Systems   Complete 12-point review of symptoms negative except for what's mentioned in HPI     Objective:     BP (!) 151/70   Pulse 65   Temp 98.1 °F (36.7 °C) (Oral)   Resp 18   Ht 5' 8" (1.727 m)   Wt 67.7 kg (149 lb 4 oz)   SpO2 96%   BMI 22.69 kg/m²        Intake/Output Summary (Last 24 hours) at 9/29/2022 1104  Last data filed at 9/29/2022 0400  Gross per 24 hour   Intake 720 ml   Output --   Net 720 ml       Physical Exam  Constitutional:       Appearance: Normal appearance.   HENT:      Head: Normocephalic.      Mouth/Throat:      Mouth: Mucous membranes are moist.   Eyes:      Pupils: Pupils are equal, round, and reactive to light.   Cardiovascular:      Rate and Rhythm: Normal rate and regular rhythm.      Heart sounds: Normal heart sounds.   Pulmonary:      Effort: Pulmonary effort is normal.      Breath sounds: Normal breath sounds.   Abdominal:      General: Bowel sounds are normal.      Palpations: Abdomen is soft.   Musculoskeletal:      Cervical back: Neck supple.      Comments: Generalized weakness and muscle atrophy.  Left hip dressing clean and intact.   Skin:     General: Skin is warm and dry.   Neurological:      General: No focal deficit present.      Mental Status: She is alert and oriented to person, place, and time.   Psychiatric:         Mood and Affect: Mood normal.         Behavior: Behavior normal.         Thought Content: Thought content normal.         Judgment: Judgment normal.   *MD performed and documented physical examination       Lines/Drains/Airways       Epidural Line  Duration                  Neuraxial Analgesia/Anesthesia Assessment (using dermatomes) Epidural 09/21/22 1236 7 days         Neuraxial Analgesia/Anesthesia Assessment (using dermatomes) Epidural 09/21/22 1236 7 days                  Labs  No results found for this or any previous visit (from the past 24 hour(s)).    Radiology  Left hip x-ray 9/22:  Good position of left hip prosthesis    Assessment/Plan: "     81 y.o. WM admitted on 9/23/2022    Left femoral neck fracture 2/2 fall   - s/p left hip hemiarthroplasty on 09/21/2022  - LLE WBAT  - continue                Aspirin 81 mg b.i.d.                 Methocarbamol 750 mg 4 times daily p.r.n. muscle spasms                 Gabapentin 300 mg at bedtime                Tylenol 500 mg every 4 hours                 Tramadol 50 mg every 4 hours p.r.n.  - follow-up outpatient with orthopedic surgery     GERD  - Avoid spicy foods, and nothing to eat or drink within x2 hours of bedtime or laying flat (water is ok)   - Avoid NSAIDs (Advil, ibuprofen, naproxen...) and resendiz-2 inhibitors (Mobic, Celebrex)    - continue                Pepcid 20 mg b.i.d.     HTN  - BP mod control!!  - continue                Lisinopril 20 mg daily (increased 9/24)                Amlodipine 5 mg daily                 Coreg 12.5 mg b.i.d.                Hydralazine 10 mg every 2 hours as needed for BP > 160/90                Labetalol 10 mg every 2 hours as needed for BP > 160/90     HLD  - FLP with next labs  - continue                Atorvastatin 40 mg daily     Major depressive disorder  - In remission   - Continue                Wellbutrin 300 mg daily      Constipation  - Stable   - Continue                 MiraLax 17 g b.i.d. (increased 9/27)                Colace 100 mg b.i.d.     Insomnia   - Stable   - Continue                Mirtazapine 30 mg nightly      COPD   - Stable   - Continue                Symbicort 2 puffs daily     Tobacco abuse   - Current  - Smokes 1 pack of cigarettes daily  - discontinued Nicotine patch on 9/28  - Counseled on smoking cessation     Normocytic anemia  - asymptomatic  - H/H stable   - no evidence of active bleeds  - will closely monitor and transfuse if needed     Sinus congestion   - improved  - continue  Zyrtec 10 mg daily (initiated 9/28)  Flonase 100 mcg b.i.d. (initiated 9/28)     VTE Prophylaxis:  Lovenox 40 mg daily  COVID-19 testing: Negative  9/23/2022  COVID-19 vaccination status:  Unvaccinated     POA: no  Living will: no  Contacts: Namrata Yu (sister) [phone]                     Lex Yu (brother-in-law) [phone]     CODE STATUS: Full  Internal Medicine (attending): Vasiliy Connell MD  Physiatry (consulting):  Vernon Manrique MD     OUTPATIENT PROVIDERS  PCP: Ronal Zuluaga MD  Orthopedic surgery:  Christos Christensen MD     DISPOSITION: Condition stable.  Vital signs at goal.  No labs today.  Bowel management, sleep hygiene, and appetite at goal.  Continues to progress well with therapies.  Monitor closely.  Notify of any acute changes.    Staffing 9/27: Continent of bowel and bladder. Good appetite. RT: overall supervision. PT: overall supervision. OT: maintains precautions, overall supervision to partial mod. Projected discharge 10/3.    Olga Herbert NP conducted independent physical examination and assisted with medical documentation.    Total time spent on this encounter including chart review and direct MD + NP 1-on-1 patient interaction: 39 minutes   Over 50% of this time was spent in counseling and coordination of care

## 2022-09-29 NOTE — PT/OT/SLP RE-EVAL
"Occupational Therapy Inpatient Rehab Re-Evaluation    Name: Yoan Xavier  MRN: [id number]    Recommendations:     Discharge Recommendations:    SVN   Discharge Equipment Recommendations:     Barriers to discharge: None    Assessment:  Yoan Xavier is a 81 y.o. female admitted with a medical diagnosis of Closed displaced midcervical fracture of left femur with routine healing.  She presents with the following impairments/functional limitations:  impaired self care skills, impaired functional mobility .    General Precautions: Standard, fall     Orthopedic Precautions:LLE weight bearing as tolerated, LLE posterior precautions         Rehab Prognosis: ; patientGood would benefit from acute skilled OT services to address these deficits and reach maximum level of function.      History:     Past Medical History:   Diagnosis Date    COPD (chronic obstructive pulmonary disease)     Depression     Hypertension     Insomnia     Lumbar degenerative disc disease     Mixed hyperlipidemia        Past Surgical History:   Procedure Laterality Date    cyst on tailbone      HEMIARTHROPLASTY, HIP, POSTERIOR APPROACH Left 9/21/2022    Procedure: HEMIARTHROPLASTY, HIP, POSTERIOR APPROACH;  Surgeon: Christos Christensen MD;  Location: [facility];  Service: Orthopedics;  Laterality: Left;    right hip replacement         Subjective     Orientation: Oriented x4    Chief Complaint: "My back hurts if I stand up too long    Patient/Family Comments/goals: Pt. Continues to discuss living arrangements (apartment)       Respiratory Status: Room air    Patients cultural, spiritual, Latter day conflicts given the current situation:  no       Living Environment   Living Environment  Lives With: alone  Living Arrangements: assisted living  Number of Stairs, Main Entrance: none  Equipment Currently Used at Home: none  Shower Setup: Walk-in shower    Prior Level of Function  BADL: Modified Independent    IADL: Modified Independent    Equipment used at " home: walker, rolling.      Upon discharge, patient will have assistance from assisted living staff.    Objective:     Patient found up in chair with  call bell  upon OT entry to room.    Mobility   Patient completed:  Ambulation via RW and IADL for transporting /making coffee.     Functional Mobility   Toilet transfer CGA but Pt. Sits too hard ; required v/c's to sit softly.     ADLs     Current Status   Eating     Oral Hygiene     Shower, Bathe Self     Upper Body Dressing     Lower Body Dressing     Toileting Hygiene     Toilet Transfer     Putting On, Taking Off Footwear       Limiting Factors for ADLs: cognition and safety awareness  IADLs: laundry Ax c reacher to maintain THPx's - required a rest break     Exams     ROM:          -       WFL      ROM Hand  Left Hand: WFL  Right Hand: WFL    Strength  Overall Strength:          -       WFL      Visual/Perceptual  Intact    Cognition:   WFL, except occasiooooonal ++    Balance    Sitting  Sitting Surface: TTB  Static: One UE Extremity, Good      Standing  Static: Bilateral UE Extremity Support, Good (-)        Posture/Deviations  kyphotic    Additional Treatments       LifeStyle Change and Education     Patient left up in chair with call button in reach and chair alarm on.    Education provided: ADLs, transfer training, body mechanics, assistive device, wheelchair precautions, fall prevention, and post-op precautions    Multidisciplinary Problems       Occupational Therapy Goals          Problem: Occupational Therapy    Goal Priority Disciplines Outcome Interventions   Occupational Therapy Goal     OT, PT/OT                         Plan     During this hospitalization, patient to be seen   to address the identified rehab impairments via self-care/home management, therapeutic activities, therapeutic exercises and progress toward the following goals:    Plan of Care Expires:       Time Tracking     OT Received On:    Time In       Time Out    Total Time    Therapy  Time:    Missed Time:  0  Missed Time Reason:      Billable Minutes: Re-eval 30 and Self Care/Home Management 30 09/30/2022

## 2022-09-29 NOTE — PT/OT/SLP PROGRESS
Physical Therapy Inpatient Rehab Treatment    Patient Name:  Yoan Xavier   MRN:  [id number]    Recommendations:     Discharge Recommendations:  other (see comments) (pending)   Discharge Equipment Recommendations: walker, rolling   Barriers to discharge: Decreased caregiver support    Assessment:     Yoan Xavier is a 81 y.o. female admitted with a medical diagnosis of Closed displaced midcervical fracture of left femur with routine healing.  She presents with the following impairments/functional limitations:  weakness, impaired endurance, impaired self care skills, impaired functional mobility, gait instability, impaired balance, decreased lower extremity function, decreased safety awareness, pain, decreased ROM, orthopedic precautions .    Rehab Diagnosis:     Recent Surgery: * No surgery found *      General Precautions: Standard, fall     Orthopedic Precautions:LLE weight bearing as tolerated, LLE posterior precautions     Braces: N/A    Rehab Prognosis: Good; patient would benefit from acute skilled PT services to address these deficits and reach maximum level of function.      History:     Past Medical History:   Diagnosis Date    COPD (chronic obstructive pulmonary disease)     Depression     Hypertension     Insomnia     Lumbar degenerative disc disease     Mixed hyperlipidemia        Past Surgical History:   Procedure Laterality Date    cyst on tailbone      HEMIARTHROPLASTY, HIP, POSTERIOR APPROACH Left 9/21/2022    Procedure: HEMIARTHROPLASTY, HIP, POSTERIOR APPROACH;  Surgeon: Christos Christensen MD;  Location: [facility];  Service: Orthopedics;  Laterality: Left;    right hip replacement         Subjective     Chief Complaint: none  Patient/Family Comments/goals: to go home     Respiratory Status: Room air    Patients cultural, spiritual, Uatsdin conflicts given the current situation:        Objective:     Communicated with nursing  prior to session.  Patient found up in chair with Other (comments)  "(in wc)  upon PT entry to room.    Pt is Not oriented to place and Not oriented to situation and Alert and Cooperative.    Functional Mobility:   Bed Mobility:     Supine to Sit: independence  Sit to Supine: independence  Transfers:     Sit to Stand:  independence with rolling walker  Bed to Chair: independence with  rolling walker  using  Step Transfer and few times during txs am/pm   Toilet Transfer: supervision with  rolling walker  using  Step Transfer and in pm tx time post void pt able to self manage clothing with supervision for safety   Car Transfer: supervision and pt mod I to get into car required vc for left hip pxs coming out of car  with  rolling walker  using  Step Transfer  Gait: Pt ambulates 150ft with RW with [place].   12 stairs with bilateral handrails with pt required sba when descending stairs pt used backwards tech which is way pt stated use to performing and demonstrated improved safety with backwards tech descending steps   4" curb with rolling walker with Standby Assistance  Ambulate 2 trials 100 feet on uneven surfaces/ramps with rolling walker with cga in outside community environment with seated rest break b/t trials   Wheelchair Propulsion:  Pt propelled Standard wheelchair x 200 feet on Level tile with  Bilateral upper extremity with Modified Independent and 200ft 2 times to get outside .      Current   Status  Discharge   Goal   Functional Area: Care Score:    Roll Left and Right   Independent   Sit to Lying 6  Independent   Lying to Sitting on Side of Bed  6 Independent   Sit to Stand 6  Independent   Chair/Bed-to-Chair Transfer 6  Independent   Car Transfer 4 Independent   Walk 10 Feet  6 Independent   Walk 50 Feet with Two Turns 6 Independent   Walk 150 Feet 6  Independent   Walk 10 Feet Uneven Surface 4  Independent   1 Step (Curb) 4  Independent   4 Steps  4 Independent   12 Steps 4  Independent   Picking Up Object       Wheel 50 Feet with Two Turns  6     Wheel 150 Feet  6   "       Therapeutic Activities and Exercises:  Pt performed supine HEP in pm tx time one set 10reps with min a with lle slr rest breaks vc for recall of exercises   Pt sat EOB assisted with changing pts diaper secondary to stated was wet in pm tx time   Activity Tolerance    Patient left HOB elevated with call button in reach, bed alarm on, and pt christa tx request returned to bed after tx  .    Education provided: transfer training, bed mob, gait training, stair training, safety awareness, assistive device, wheelchair management, and strengthening exercises    Expected compliance:    GOALS:   Multidisciplinary Problems       Physical Therapy Goals          Problem: Physical Therapy    Goal Priority Disciplines Outcome Goal Variances Interventions   Physical Therapy Goal     PT, PT/OT Ongoing, Progressing     Description: Short Term goals      Bed Mobility   Roll Right and Left Independent .  Lying to sitting Independent .  Sitting to lying Independent .    Transfers    Pt will be able to perform Stand step chair/bed to chair transfer With RW Independent .  Pt will be able to perform Sit to stand with RW Independent .  Pt will be able to perform Car transfer with RW Independent .    Ambulation    Pt will ambulate 500 Feet with RW Independent .  Pt will be able to ascend/descend 12 stairs using 1HR Rails Independent .  Pt will be able to ascend/descend 6 inch curb using RW Independent .  Pt will be able to ambulate uneven surfaces/ramp 10 feet with RW Independent .          Timeframe: By Discharge                          Plan:     During this hospitalization, patient to be seen 5 x/week to address the identified rehab impairments via gait training, therapeutic activities, therapeutic exercises, neuromuscular re-education and progress toward the following goals:    Plan of Care Expires:  09/30/22    Additional Information:         Time Tracking:     PT Received On: 09/29/22  Time In 1130  in am tx in time in pm tx 1300  ([id number] , [id number])  Time Out 1200in am tx  out time in pm tx 1400  Total Time 30 min and 60min  Therapy Time PT Individual: 30 and 60min (total tx time am/pm 90min)  Missed Time:    Time Missed due to:      Billable Minutes: Gait Training 15min, Therapeutic Activity 60min, and Therapeutic Exercise 15min    09/29/2022

## 2022-09-29 NOTE — PT/OT/SLP RE-EVAL
Physical Therapy Rehab Re-Evaluation    Patient Name:  Yoan Xavier   MRN:  [id number]    Recommendations:     Discharge Recommendations:  other (see comments) (pending)   Discharge Equipment Recommendations: walker, rolling   Barriers to discharge:  decreased safety awareness    Assessment:     Yoan Xavier is a 81 y.o. female admitted with a medical diagnosis of Closed displaced midcervical fracture of left femur with routine healing.  She presents with the following impairments/functional limitations:  weakness, impaired endurance, impaired self care skills, impaired functional mobility, gait instability, decreased lower extremity function, decreased safety awareness, pain, decreased ROM, orthopedic precautions .    General Precautions: Standard, fall     Orthopedic Precautions: LLE weight bearing as tolerated, LLE posterior precautions     Braces: N/A    Rehab Prognosis: Good; patient would benefit from acute skilled PT services to address these deficits and reach maximum level of function.      History:     Past Medical History:   Diagnosis Date    COPD (chronic obstructive pulmonary disease)     Depression     Hypertension     Insomnia     Lumbar degenerative disc disease     Mixed hyperlipidemia        Past Surgical History:   Procedure Laterality Date    cyst on tailbone      HEMIARTHROPLASTY, HIP, POSTERIOR APPROACH Left 9/21/2022    Procedure: HEMIARTHROPLASTY, HIP, POSTERIOR APPROACH;  Surgeon: Christos Christensen MD;  Location: [facility];  Service: Orthopedics;  Laterality: Left;    right hip replacement         Subjective     Chief Complaint: Occasional L LE pain    Pain: 0/10 reported this session    Respiratory Status: Room air    Patients cultural, spiritual, Jain conflicts given the current situation:         Living Environment  Lives With: alone, facility resident  Living Arrangements: assisted living  Number of Stairs, Main Entrance: none  Equipment Currently Used at Home: walker,  rolling    Prior Level of Function  Ambulation Skills: independent  Transfer Skills: independent  ADL Skills: independent  Work/Leisure Activity: independent      Objective:     Communicated with pt prior to session.  Patient found supine with    upon PT entry to room.    Exams  Cognitive Exam:  Patient is oriented to Person, Place, Time, and Situation    Functional Mobility  Bed Mobility:     Rolling Left:  modified independence  Rolling Right: modified independence  Supine to Sit: modified independence  Sit to Supine: modified independence  Transfers:     Sit to Stand:  modified independence with rolling walker  Bed to Chair: modified independence with  rolling walker  using  Stand Pivot  Gait: Pt ambulates 250 with RW with mod I - SBA.   Ambulate 15 feet on uneven surfaces/ramps with rolling walker with Standby Assistance   an object from the ground in standing position with rolling walker and reacher with Modified [place]    GGs   Current   Status   Functional Area: Care Score:   Roll Left and Right 6   Sit to Lying 6   Lying to Sitting on Side of Bed 6   Sit to Stand 6   Chair/Bed-to-Chair Transfer 4   Car Transfer 4   Walk 10 Feet 6   Walk 50 Feet with Two Turns 6   Walk 150 Feet 6   Walk 10 Feet Uneven Surface 4   1 Step (Curb) 4   4 Steps 4   12 Steps 4   Picking Up Object 6   Wheel 50 Feet with Two Turns 6   Wheel 150 Feet 6     Therapeutic Activities and Exercises:  Co-treat with CTRS Radha: golf activity in standing with occasional use of RW for balance. Performed to facilitate improvements in activity tolerance, balance, and coordination.    Activity Tolerance    Patient left up in chair with call button in reach and chair alarm on.    Education Provided: roles and goals of PT/PTA, transfer training, bed mob, gait training, balance training, safety awareness, body mechanics, assistive device, and hip precautions    Expected compliance: High compliance    GOALS:   Multidisciplinary Problems        Physical Therapy Goals          Problem: Physical Therapy    Goal Priority Disciplines Outcome Goal Variances Interventions   Physical Therapy Goal     PT, PT/OT Ongoing, Progressing     Description: Short Term goals      Bed Mobility   Roll Right and Left Independent (MET)  Lying to sitting Independent (MET)  Sitting to lying Independent (MET)    Transfers    Pt will be able to perform Stand step chair/bed to chair transfer With RW Independent (MET)  Pt will be able to perform Sit to stand with RW Independent (MET)  Pt will be able to perform Car transfer with RW Independent (Progressing)    Ambulation    Pt will ambulate 500 Feet with RW Independent (Progressing)  Pt will be able to ascend/descend 12 stairs using 1HR Rails Independent (Progressing)  Pt will be able to ascend/descend 6 inch curb using RW Independent (Progressing)  Pt will be able to ambulate uneven surfaces/ramp 10 feet with RW Independent (Progressing)          Timeframe: By Discharge                          Plan:     During this hospitalization, patient to be seen 5 x/week to address the identified rehab impairments via gait training, therapeutic activities, therapeutic exercises, neuromuscular re-education and progress toward the following goals:    Plan of Care Expires:  09/30/22    Time Tracking:     PT Received On:    Time In 0930     Time Out 1030  Total Time 60 min  Therapy Time PT Start Time: 0930  PT Stop Time: 1030  PT Total Time (min): 60 min  PT Individual: 60  Missed Time:    Time Missed due to:      Billable Minutes: Re-eval 20 min and Therapeutic Activity 40 min    09/29/2022

## 2022-09-30 PROCEDURE — 97110 THERAPEUTIC EXERCISES: CPT | Mod: CQ

## 2022-09-30 PROCEDURE — 97530 THERAPEUTIC ACTIVITIES: CPT | Mod: CQ

## 2022-09-30 PROCEDURE — 25000003 PHARM REV CODE 250: Performed by: NURSE PRACTITIONER

## 2022-09-30 PROCEDURE — 97110 THERAPEUTIC EXERCISES: CPT

## 2022-09-30 PROCEDURE — 63600175 PHARM REV CODE 636 W HCPCS: Performed by: NURSE PRACTITIONER

## 2022-09-30 PROCEDURE — 97535 SELF CARE MNGMENT TRAINING: CPT

## 2022-09-30 PROCEDURE — 97116 GAIT TRAINING THERAPY: CPT | Mod: CQ

## 2022-09-30 PROCEDURE — 11800000 HC REHAB PRIVATE ROOM

## 2022-09-30 RX ORDER — FLUTICASONE PROPIONATE 50 MCG
2 SPRAY, SUSPENSION (ML) NASAL DAILY
Status: DISCONTINUED | OUTPATIENT
Start: 2022-09-30 | End: 2022-09-30

## 2022-09-30 RX ADMIN — FAMOTIDINE 20 MG: 20 TABLET ORAL at 08:09

## 2022-09-30 RX ADMIN — CARVEDILOL 12.5 MG: 6.25 TABLET, FILM COATED ORAL at 08:09

## 2022-09-30 RX ADMIN — ASPIRIN 81 MG 81 MG: 81 TABLET ORAL at 08:09

## 2022-09-30 RX ADMIN — ENOXAPARIN SODIUM 40 MG: 40 INJECTION SUBCUTANEOUS at 05:09

## 2022-09-30 RX ADMIN — POLYETHYLENE GLYCOL 3350 17 G: 17 POWDER, FOR SOLUTION ORAL at 08:09

## 2022-09-30 RX ADMIN — DOCUSATE SODIUM 100 MG: 100 CAPSULE, LIQUID FILLED ORAL at 08:09

## 2022-09-30 RX ADMIN — BUPROPION HYDROCHLORIDE 300 MG: 150 TABLET, FILM COATED, EXTENDED RELEASE ORAL at 08:09

## 2022-09-30 RX ADMIN — AMLODIPINE BESYLATE 5 MG: 5 TABLET ORAL at 08:09

## 2022-09-30 RX ADMIN — GABAPENTIN 300 MG: 300 CAPSULE ORAL at 08:09

## 2022-09-30 RX ADMIN — LIDOCAINE 5% 1 PATCH: 700 PATCH TOPICAL at 06:09

## 2022-09-30 RX ADMIN — MIRTAZAPINE 30 MG: 15 TABLET, FILM COATED ORAL at 08:09

## 2022-09-30 RX ADMIN — LISINOPRIL 20 MG: 10 TABLET ORAL at 08:09

## 2022-09-30 RX ADMIN — ATORVASTATIN CALCIUM 40 MG: 40 TABLET, FILM COATED ORAL at 08:09

## 2022-09-30 RX ADMIN — CETIRIZINE HYDROCHLORIDE 10 MG: 10 TABLET, FILM COATED ORAL at 08:09

## 2022-09-30 NOTE — PT/OT/SLP PROGRESS
"Occupational Therapy Inpatient Rehab Treatment    Name: Yoan Xavier  MRN: [id number]    Assessment:  Yoan Xavier is a 81 y.o. female admitted with a medical diagnosis of Closed displaced midcervical fracture of left femur with routine healing.  She presents with the following impairments/functional limitations:  decreased lower extremity function, orthopedic precautions .    General Precautions: Standard, fall     Orthopedic Precautions:LLE weight bearing as tolerated, LLE posterior precautions At this time, pt is currently a yellow fall risk star, indicating that pt no longer requires bed/chair alarms. Pt to be assessed throughout treatment sessions, progress discussed with team, and fall risk star updated as appropriate.     Braces: N/A    Rehab Prognosis: Good; patient would benefit from acute skilled OT services to address these deficits and reach maximum level of function.      History:     Past Medical History:   Diagnosis Date    COPD (chronic obstructive pulmonary disease)     Depression     Hypertension     Insomnia     Lumbar degenerative disc disease     Mixed hyperlipidemia        Past Surgical History:   Procedure Laterality Date    cyst on tailbone      HEMIARTHROPLASTY, HIP, POSTERIOR APPROACH Left 9/21/2022    Procedure: HEMIARTHROPLASTY, HIP, POSTERIOR APPROACH;  Surgeon: Christos Christensen MD;  Location: [facility];  Service: Orthopedics;  Laterality: Left;    right hip replacement         Subjective     Orientation: Oriented x4    Chief Complaint: no c/o    Patient/Family Comments/goals: "Go back to apartment"    Respiratory Status: Room air    Patients cultural, spiritual, Latter day conflicts given the current situation: no       Objective:     Patient found up in chair with  call bell  upon OT entry to room.    Mobility   Patient completed:  Sit to Stand Transfer with modified independence with rolling walker  Stand to Sit Transfer with modified independence with rolling walker  Toilet " Transfer Step Transfer technique with modified independence with  rolling walker and bedside commode    Functional Mobility      ADLs   Current Status   Eating 6   Oral Hygiene 6   Shower, Bathe Self     Upper Body Dressing     Lower Body Dressing     Toileting Hygiene 6   Toilet Transfer     Putting On, Taking Off Footwear 4     Limiting Factors for ADLs: safety awareness and surgical Px      IADLs: Pt. Gathered and carried clothing in pillow case via RW to washing machine. Pt. Ambulated room 413>OT gym. C SBA.    Therapeutic Exercise UBE x's 5 min c B UE AROM at 1.8 knight c one rest break. Pt. Able to recall B UE AROM c red T band x's 15 reps through all joints /planes for HEP.       LifeStyle Change and Education:             Patient left up in chair with call button in reach.     Education provided: transfer training, body mechanics, assistive device, safety precautions, and fall prevention    Multidisciplinary Problems       Occupational Therapy Goals          Problem: Occupational Therapy    Goal Priority Disciplines Outcome Interventions   Occupational Therapy Goal     OT, PT/OT                         Time Tracking     OT Received On: 09/30/22  Time In 0730     Time Out 0830  Total Time 60 min  Therapy Time:  60  Missed Time:  0  Missed Time Reason:      Billable Minutes: Self Care/Home Management 45 and Therapeutic Exercise 15    09/30/2022

## 2022-09-30 NOTE — PLAN OF CARE
Called Bailey nurse at [facility], and verified that their visit with pt went well yesterday and that they are ready to receive pt there on Monday, 10/3, as planned.  Will need to call nursing with report on Monday prior to pt leaving rehab with family.

## 2022-09-30 NOTE — PLAN OF CARE
Called [facility] in [place] and verified that they have filled pt's meds (scripts sent electronically yesterday) and that [facility] should receive them today.

## 2022-09-30 NOTE — PROGRESS NOTES
09/30/22 1000   Rec Therapy Time Calculation   Date of Treatment 09/30/22   Rec Start Time 1000   Rec Stop Time 1030   Rec Total Time (min) 30 min   Time   Treatment time 2 units   Precautions   General Precautions fall   Orthopedic Precautions  LLE weight bearing as tolerated   Braces N/A   OTHER   Rehab identified problem list/impairments weakness;decreased lower extremity function;decreased safety awareness;impaired endurance   Values/Beliefs/Spiritual Care   Spiritual, Cultural Beliefs, Islam Practices, Values that Affect Care no   Overall Level of Functioning   Activity Tolerance Mod Indep   Dynamic Sitting Balance/Reaching Does not occur   Dynamic Standing Balance/Reaching Mod Indep   Right UE Coodination/Dexterity Mod Indep   Problem Solving/Sequencing Skills Standby Assist   Memory Recall Standby Assist   Attention Span Standby Assist   Recreational Therapy Short Term Goals   Short Term Goal 1 Progression Met   Short Term Goal 2 Progression Met   Recreational Therapy Long Term Goals   Long Term Goal 1 Progression Met   Long Term Goal 2 Progression Met   Plan   Patient to be seen Daily   Planned Duration Other (Comment)  (2 days)   Treatments Planned Energy conservation training   Treatment plan/goals estblished with Patient/Caregiver Yes

## 2022-09-30 NOTE — PT/OT/SLP PROGRESS
Physical Therapy Inpatient Rehab Treatment    Patient Name:  Yoan Xavier   MRN:  [id number]    Recommendations:     Discharge Recommendations:  other (see comments) (pending)   Discharge Equipment Recommendations: walker, rolling   Barriers to discharge: Decreased caregiver support    Assessment:     Yoan Xavier is a 81 y.o. female admitted with a medical diagnosis of Closed displaced midcervical fracture of left femur with routine healing.  She presents with the following impairments/functional limitations:  weakness, impaired endurance, impaired functional mobility, impaired self care skills, impaired balance, decreased lower extremity function, pain, decreased ROM, impaired muscle length, orthopedic precautions .    Rehab Diagnosis:     Recent Surgery: * No surgery found *      General Precautions: Standard, fall     Orthopedic Precautions:LLE weight bearing as tolerated, LLE posterior precautions     Braces: N/A    Rehab Prognosis: Good; patient would benefit from acute skilled PT services to address these deficits and reach maximum level of function.      History:     Past Medical History:   Diagnosis Date    COPD (chronic obstructive pulmonary disease)     Depression     Hypertension     Insomnia     Lumbar degenerative disc disease     Mixed hyperlipidemia        Past Surgical History:   Procedure Laterality Date    cyst on tailbone      HEMIARTHROPLASTY, HIP, POSTERIOR APPROACH Left 9/21/2022    Procedure: HEMIARTHROPLASTY, HIP, POSTERIOR APPROACH;  Surgeon: Christos Christensen MD;  Location: [facility];  Service: Orthopedics;  Laterality: Left;    right hip replacement         Subjective     Chief Complaint: none  Patient/Family Comments/goals: to go back to assisted living and do what I was before       Respiratory Status: Room air    Patients cultural, spiritual, Yazidism conflicts given the current situation:        Objective:     Communicated with nursing  prior to session.  Patient found up in  chair with Other (comments) (in wc)  upon PT entry to room.    Pt is Oriented to situation and Alert and Cooperative.    Functional Mobility:   Transfers:     Sit to Stand:  modified independence with rolling walker  Toilet Transfer: supervision with  rolling walker  using  Step Transfer and post void   Car Transfer: modified independence with  rolling walker  using  Step Transfer  Gait: Pt ambulates 200ft 3 trials with seated rest breaks  with RW with Modified [place].   In am tx time     In pm tx time : pt amb 150ft 2 trials to get to gym use of rw mod I                           Sit<>stand mod I with all activities during tx   Bed mob mod I    Current   Status  Discharge   Goal   Functional Area: Care Score:    Roll Left and Right   Independent   Sit to Lying   Independent   Lying to Sitting on Side of Bed   Independent   Sit to Stand 6 Independent   Chair/Bed-to-Chair Transfer 6 Independent   Car Transfer 6 Independent   Walk 10 Feet   Independent   Walk 50 Feet with Two Turns   Independent   Walk 150 Feet   Independent   Walk 10 Feet Uneven Surface   Independent   1 Step (Curb)   Independent   4 Steps   Independent   12 Steps   Independent   Picking Up Object       Wheel 50 Feet with Two Turns       Wheel 150 Feet           Therapeutic Activities and Exercises:  Pt performed seated HEP per handout given 2 sets 15reps with 2# wt BLE rest breaks reviewed hip pxs in am tx time   In pm tx time pt performed UE exercises with use of 3# dowel ceferino all planes 2 sets 15reps with pt seated away from back of chair to also focus on sitting bal/core strength   Activity Tolerance    Patient left up in chair with call button in reach and in recliner BLE elevated pt christa tx well up in recliner after am tx time  .  In pm tx time pt returned to room t/f into bed need in reach   Education provided: transfer training, gait training, safety awareness, assistive device, strengthening exercises, and hip precautions    Expected  compliance:    GOALS:   Multidisciplinary Problems       Physical Therapy Goals          Problem: Physical Therapy    Goal Priority Disciplines Outcome Goal Variances Interventions   Physical Therapy Goal     PT, PT/OT Ongoing, Progressing     Description: Short Term goals      Bed Mobility   Roll Right and Left Independent (MET)  Lying to sitting Independent (MET)  Sitting to lying Independent (MET)    Transfers    Pt will be able to perform Stand step chair/bed to chair transfer With RW Independent (MET)  Pt will be able to perform Sit to stand with RW Independent (MET)  Pt will be able to perform Car transfer with RW Independent (Progressing)    Ambulation    Pt will ambulate 500 Feet with RW Independent (Progressing)  Pt will be able to ascend/descend 12 stairs using 1HR Rails Independent (Progressing)  Pt will be able to ascend/descend 6 inch curb using RW Independent (Progressing)  Pt will be able to ambulate uneven surfaces/ramp 10 feet with RW Independent (Progressing)          Timeframe: By Discharge                          Plan:     During this hospitalization, patient to be seen 5 x/week to address the identified rehab impairments via gait training, therapeutic activities, therapeutic exercises, neuromuscular re-education and progress toward the following goals:    Plan of Care Expires:  09/30/22    Additional Information:         Time Tracking:     PT Received On: 09/30/22  Time In 1030 in am tx time , 1300 in pm tx time ([id number], [id number] tx times today)  Time Out 1200 in am tx time , 1330 in pm tx time   Total Time 90 min in am tx time , 30min in pm tx time   Therapy Time PT Individual: 90min +30min= 120min total seen today   Missed Time:    Time Missed due to:      Billable Minutes: Gait Training 45min, Therapeutic Activity 30min, and Therapeutic Exercise 45min    09/30/2022

## 2022-09-30 NOTE — PROGRESS NOTES
"Inpatient Nutrition Evaluation    Admit Date: 9/23/2022   Total duration of encounter: 7 days    Nutrition Recommendation/Prescription     Continue current diet as tolerated    Nutrition Assessment     Chart Review    Reason Seen: Rehab consult      Diagnosis:  Left femoral neck fracture 2/2 fall s/p left hip hemiarthroplasty on 09/21/2022       Relevant Medical History:   Past Medical History:   Diagnosis Date    COPD (chronic obstructive pulmonary disease)     Depression     Hypertension     Insomnia     Lumbar degenerative disc disease     Mixed hyperlipidemia      Nutrition-Related Medications: statin, pepcid, miralax     Nutrition-Related Labs:    9/30: No new labs in 72 hrs   9/26: PAB 10.8   9/25: H/H 10.7/32.9 (L), Iron 17(L), TIBC 125(L), Transferrin 111(L), Ferritin 596.23(H), Iron Sat 14(L), Gluc 80(L), PAB 8.6(L), Alb 2.5(L), HDL 25(L), GFR >60  9/22: H/H 11.4/34.7(L), Gluc 139(H), GFR >60    Diet Order: Diet Adult Regular  Oral Supplement Order: none at this time  Appetite/Oral Intake: good/% of meals  Factors Affecting Nutritional Intake: none identified at this time  Food/Islam/Cultural Preferences: none reported    Wound(s):   incision lt hip     Comments    9/30: Pt continues to report good appetite. Per EMR, is averaging ~80% meals x 3 days. Denies N/V/D. Does report constipation. Req stool softener. Notified Shira Joseph. Pt denies any further nutrition related issues at this time.     9/25: Saw pt on third floor prior to rehab admit. Continues with good appetite. Per EMR is averaging 75% po intake x last 3 days. Noted low PAB, likely decreased d/t inflammation s/p fracture/surgery. No new nutrition related issues noted at this time. No significant wt loss noted per chart review. Pt is meeting >75% est nutrition needs at this time. See est needs.    RD note from 3rd floor prior to rehab admit:  Pt reports good appetite and jokes "You never have to worry about me not eating". Tells me is " "getting plenty to eat. Denies any N/V/D or C/S difficulties. Denies any recent wt loss or any nutrition related concerns at this time.     Anthropometrics    Height: 5' 8" (172.7 cm)    Last Weight: 67.7 kg (149 lb 4 oz) (09/24/22 0500) Weight Method: Standard Scale  BMI (Calculated): 22.7  BMI Classification: normal (BMI 18.5-24.9)  Ideal Body Weight (IBW), Female: 140 lb  % Ideal Body Weight, Female (lb): 106.92 %    Usual Weight Provided By: patient    Wt Readings from Last 5 Encounters:   09/24/22 67.7 kg (149 lb 4 oz)   09/20/22 68 kg (150 lb)     Weight Change(s) Since Admission:  Admit Weight: 67.9 kg (149 lb 11.1 oz) (09/23/22 1445)   lb     Estimated/Assessed Needs     Weight Used For Calorie Calculations: 67.7 kg  Energy Calorie Requirements (kcal): 1567 kcal   Energy Need Method: MSJ x 1.3 SF  Protein Requirements: 88 g  Weight Used For Protein Calculations: 1.3 g/kg  Fluid Requirements (mL): 1567 mL  Estimated Fluid Requirement Method: 1 mL/kcal       Patient Education    Not applicable.    Monitoring & Evaluation     Dietitian will monitor food and beverage intake, energy intake, weight, and weight change.  Nutrition Risk/Follow-Up: low (follow-up in 5-7 days)  Patients assigned 'low nutrition risk' status do not qualify for a full nutritional assessment but will be monitored and re-evaluated in a 5-7 day time period.      "

## 2022-09-30 NOTE — PROGRESS NOTES
[facility] ([facility])  Rehab Progress Note    Patient Name: Yoan Xavier  MRN: [id number]  Age: 81 y.o. Sex: female  : 1940  Hospital Length of Stay: 7 days  Date of Service: 2022   Chief Complaint: Left femoral neck fracture 2/2 fall s/p left hip hemiarthroplasty on 2022    Subjective:     Basic Information  Admit Information: 81-year-old WF presented to [facility] on  complaints of left hip pain after falling doing laundry twelve days prior.  PMH significant for hypertension and hyperlipidemia.  Workup significant for left femoral neck fracture.  On  tolerated left hemiarthroplasty without perioperative complications. Tolerated transfer to [facility] inpatient rehab unit on  without incident.  Today's Information: No acute events overnight.  Sitting in chair comfortably working with therapy in PT gym.  Last BM .  Reports good sleep and appetite.  Vital signs at goal with no recent recorded fevers.  No labs or imaging today.    Review of patient's allergies indicates:  No Known Allergies     Current Facility-Administered Medications:     acetaminophen tablet 500 mg, 500 mg, Oral, Q4H, Olga Herbert, FNP, 500 mg at 22 1506    amLODIPine tablet 5 mg, 5 mg, Oral, Daily, Olga Herbert, FNP, 5 mg at 22 0835    aspirin chewable tablet 81 mg, 81 mg, Oral, BID, Olga Herbert, FNP, 81 mg at 22 0835    atorvastatin tablet 40 mg, 40 mg, Oral, Daily, Olga Herbert, FNP, 40 mg at 22 0836    benzonatate capsule 100 mg, 100 mg, Oral, TID PRN, Olga Herbert, FNP    buPROPion TB24 tablet 300 mg, 300 mg, Oral, Daily, Olga Herbert, FNP, 300 mg at 22 0835    carvediloL tablet 12.5 mg, 12.5 mg, Oral, BID, Olga Herbert, FNP, 12.5 mg at 22 0836    cetirizine tablet 10 mg, 10 mg, Oral, Daily, Robert Vera FNP, 10 mg at 22 0835    docusate sodium capsule 100 mg, 100 mg, Oral, BID, Olga Herbert, FNP, 100  mg at 09/30/22 0836    enoxaparin injection 40 mg, 40 mg, Subcutaneous, Daily, XOCHILT CotterP, 40 mg at 09/29/22 1739    famotidine tablet 20 mg, 20 mg, Oral, BID, XOCHILT CotterP, 20 mg at 09/30/22 0835    famotidine tablet 20 mg, 20 mg, Oral, BID PRN, WILFRID Cotter, 20 mg at 09/28/22 0826    fluticasone furoate-vilanteroL 200-25 mcg/dose diskus inhaler 1 puff, 1 puff, Inhalation, Daily, 1 puff at 09/27/22 0903 **AND** MDI Daily, , , Daily, WILFRID Cotter    fluticasone propionate 50 mcg/actuation nasal spray 100 mcg, 2 spray, Each Nostril, BID, XOCHILT PenalozaP    gabapentin capsule 300 mg, 300 mg, Oral, QHS, XOCHILT CotterP, 300 mg at 09/29/22 2127    hydrALAZINE injection 10 mg, 10 mg, Intravenous, Q6H PRN, XOCHILT CotterP, 10 mg at 09/24/22 0456    hydrOXYzine pamoate capsule 50 mg, 50 mg, Oral, Nightly PRN, WILFRID Cotter, 50 mg at 09/25/22 2006    labetalol 20 mg/4 mL (5 mg/mL) IV syring, 10 mg, Intravenous, Q4H PRN, WILFRID Cotter    LIDOcaine 5 % patch 1 patch, 1 patch, Transdermal, Q24H, WILFRID Roblero, 1 patch at 09/30/22 0622    lisinopriL tablet 20 mg, 20 mg, Oral, Daily, XOCHILT PenalozaP, 20 mg at 09/30/22 0836    methocarbamoL tablet 500 mg, 500 mg, Oral, TID, Tanisha Painter, FNP, 500 mg at 09/29/22 1506    methocarbamoL tablet 750 mg, 750 mg, Oral, QID PRN, WILFRID Cotter    metoprolol injection 10 mg, 10 mg, Intravenous, Q2H PRN, WILFRID Cotter    mirtazapine tablet 30 mg, 30 mg, Oral, QHS, WILFRID Cotter, 30 mg at 09/29/22 2127    nitroGLYCERIN SL tablet 0.4 mg, 0.4 mg, Sublingual, Q5 Min PRN, WILFRID Cotter    ondansetron disintegrating tablet 4 mg, 4 mg, Oral, Q6H PRN, WILFRID Cotter    polyethylene glycol packet 17 g, 17 g, Oral, BID, WILFRID Cotter, 17 g at 09/30/22 0835    traMADoL tablet 50 mg, 50 mg, Oral, Q4H PRN, WILFRID Cotter, 50 mg at 09/28/22 0827     Review  "of Systems   Complete 12-point review of symptoms negative except for what's mentioned in HPI     Objective:     /74   Pulse (!) 52   Temp 98.1 °F (36.7 °C) (Oral)   Resp 20   Ht 5' 8" (1.727 m)   Wt 67.7 kg (149 lb 4 oz)   SpO2 96%   BMI 22.69 kg/m²        Intake/Output Summary (Last 24 hours) at 9/30/2022 1048  Last data filed at 9/30/2022 0800  Gross per 24 hour   Intake 960 ml   Output --   Net 960 ml       Physical Exam  Constitutional:       Appearance: Normal appearance.   HENT:      Head: Normocephalic.      Mouth/Throat:      Mouth: Mucous membranes are moist.   Eyes:      Pupils: Pupils are equal, round, and reactive to light.   Cardiovascular:      Rate and Rhythm: Normal rate and regular rhythm.      Heart sounds: Normal heart sounds.   Pulmonary:      Effort: Pulmonary effort is normal.      Breath sounds: Normal breath sounds.   Abdominal:      General: Bowel sounds are normal.      Palpations: Abdomen is soft.   Musculoskeletal:      Cervical back: Neck supple.      Comments: Generalized weakness and muscle atrophy.  Left hip dressing clean and intact.   Skin:     General: Skin is warm and dry.   Neurological:      General: No focal deficit present.      Mental Status: She is alert and oriented to person, place, and time.   Psychiatric:         Mood and Affect: Mood normal.         Behavior: Behavior normal.         Thought Content: Thought content normal.         Judgment: Judgment normal.   *MD performed and documented physical examination       Lines/Drains/Airways       Epidural Line  Duration                  Neuraxial Analgesia/Anesthesia Assessment (using dermatomes) Epidural 09/21/22 1236 8 days         Neuraxial Analgesia/Anesthesia Assessment (using dermatomes) Epidural 09/21/22 1236 8 days                  Labs  No results found for this or any previous visit (from the past 24 hour(s)).    Radiology  Left hip x-ray 9/22:  Good position of left hip prosthesis    Assessment/Plan: "     81 y.o. WM admitted on 9/23/2022    Left femoral neck fracture 2/2 fall   - s/p left hip hemiarthroplasty on 09/21/2022  - LLE WBAT  - continue                Aspirin 81 mg b.i.d.                 Methocarbamol 750 mg 4 times daily p.r.n. muscle spasms                 Gabapentin 300 mg at bedtime                Tylenol 500 mg every 4 hours                 Tramadol 50 mg every 4 hours p.r.n.  - follow-up outpatient with orthopedic surgery     GERD  - Avoid spicy foods, and nothing to eat or drink within x2 hours of bedtime or laying flat (water is ok)   - Avoid NSAIDs (Advil, ibuprofen, naproxen...) and resendiz-2 inhibitors (Mobic, Celebrex)    - continue                Pepcid 20 mg b.i.d.     HTN  - BP mod control!!  - continue                Lisinopril 20 mg daily (increased 9/24)                Amlodipine 5 mg daily                 Coreg 12.5 mg b.i.d.                Hydralazine 10 mg every 2 hours as needed for BP > 160/90                Labetalol 10 mg every 2 hours as needed for BP > 160/90     HLD  - FLP with next labs  - continue                Atorvastatin 40 mg daily     Major depressive disorder  - In remission   - Continue                Wellbutrin 300 mg daily      Constipation  - Stable   - Continue                 MiraLax 17 g b.i.d. (increased 9/27)                Colace 100 mg b.i.d.     Insomnia   - Stable   - Continue                Mirtazapine 30 mg nightly      COPD   - Stable   - Continue                Symbicort 2 puffs daily     Tobacco abuse   - Current  - Smokes 1 pack of cigarettes daily  - discontinued Nicotine patch on 9/28  - Counseled on smoking cessation     Normocytic anemia  - asymptomatic  - H/H stable   - no evidence of active bleeds  - will closely monitor and transfuse if needed     Sinus congestion   - improved  - continue  Zyrtec 10 mg daily (initiated 9/28)  Flonase 100 mcg b.i.d. (initiated 9/28)     VTE Prophylaxis:  Lovenox 40 mg daily  COVID-19 testing: Negative  9/23/2022  COVID-19 vaccination status:  Unvaccinated     POA: no  Living will: no  Contacts: Namrata Yu (sister) [phone]                     Lex Yu (brother-in-law) [phone]     CODE STATUS: Full  Internal Medicine (attending): Vasiliy Connell MD  Physiatry (consulting):  Vernon Manrique MD     OUTPATIENT PROVIDERS  PCP: Ronal Zuluaga MD  Orthopedic surgery:  Christos Christensen MD     DISPOSITION: Condition stable.  Vital signs at goal.  No labs today.  Bowel management, sleep hygiene, and appetite at goal.  Continues to progress well with therapies.  Monitor closely.  Notify of any acute changes.    Staffing 9/27: Continent of bowel and bladder. Good appetite. RT: overall supervision. PT: overall supervision. OT: maintains precautions, overall supervision to partial mod. Projected discharge 10/3.    Olga Herbert NP conducted independent physical examination and assisted with medical documentation.    Total time spent on this encounter including chart review and direct MD + NP 1-on-1 patient interaction: 38 minutes   Over 50% of this time was spent in counseling and coordination of care

## 2022-09-30 NOTE — PLAN OF CARE
Final PHQ-2 completed (score=0).  Pt is feeling very good about her progress and is ready to return to [facility] Monday.  No current issues with mood.      Relayed detailed plans for discharge Monday, 10/3, to patient.  No questions.

## 2022-09-30 NOTE — PT/OT/SLP DISCHARGE
Recreational Therapy Discharge      Date of Treatment: 09/30/22  Start Time: 1000  Stop Time: 1030  Total Time: 30 min  Missed Time:     Assessment      Yoan Xavier is a 81 y.o. female admitted with a medical diagnosis of Closed displaced midcervical fracture of left femur with routine healing.  She presents with the following impairments/functional limitations:  weakness, decreased lower extremity function, decreased safety awareness, impaired endurance .    Rehab Diagnosis:     Recent Surgery: * No surgery found *      General Precautions: Standard, fall     Orthopedic Precautions:LLE weight bearing as tolerated, LLE posterior precautions     Braces: N/A    Rehab Prognosis: Good; patient would benefit from acute skilled Recreational Therapy services to address these deficits and reach maximum level of function.      Impairments: Endurance deficits and Strength deficits  Rehab Potential: Good  Treatment Recommendations: Complete discharge plan  Treatment Diagnosis: Fall, L femoral neck fx, Lhip hemiarthroplasty, HTN, HLD,  Orientation: Oriented x4  Affect/Behavior: Appropriate and Cooperative  Safety/Judgement: intact   Basic Command Following: intact  Spiritual Cultural: no        History     Past Medical History:   Diagnosis Date    COPD (chronic obstructive pulmonary disease)     Depression     Hypertension     Insomnia     Lumbar degenerative disc disease     Mixed hyperlipidemia        Past Surgical History:   Procedure Laterality Date    cyst on tailbone      HEMIARTHROPLASTY, HIP, POSTERIOR APPROACH Left 9/21/2022    Procedure: HEMIARTHROPLASTY, HIP, POSTERIOR APPROACH;  Surgeon: Christos Christensen MD;  Location: [facility];  Service: Orthopedics;  Laterality: Left;    right hip replacement         Home Environment     Admit Date: 09/23/22  Living Situation  Lives With: alone  Lives in: assisted living  Patients Responsibilities: , Financial management, Health and wellness, Laundry,  "Leisure/play/hobbies, Meal preparation, Retired, Shopping  Number of Children: 0  Occupation:Retired: Clerical work for [employer]    Instrumental Activities of Daily Living     Previous Hand Dominance: Right Current Hand Dominance:       Other iADL Information:        Cognitive Skills Building         Cognitive Observation Activity Assist Position Equipment Response            Comment:      Dynamic Activities      Activity Assist Position Equipment Response   Activity 1 Bowling independence and modified independence Standing Rolling walker and Rubber bowling balls good   Comment: Bed to w/c transfer was setup. Sit to stand was setup/I as was dynamic standing balance/reaching. Standing tolerance was 10 minutes. RUE coordination was setup as were problem solving skills.        Fine Motor Activities      Activity Assist Position Equipment Response           Comment:        Goals     Patient Goals  Patient Goal 1: "To walk without the walker."    Short Term Goals    Goal  Goal Status   Will increase activity tolerance to settup Met   Will improve dynamic standing balance/reaching to supervision Met                 Long Term Goals    Goal Goal Status   Will increase standing tolerance to 5,10 minutes Met   Will improve dynamic standng balance/reaching to setup Met                     Plan       Patient to be seen: Daily  Duration: Other (Comment) (2 days)  Treatments planned: Energy conservation training  Treatment plan/goals established with Patient/Caregiver: Yes     "

## 2022-09-30 NOTE — PLAN OF CARE
Problem: Rehabilitation (IRF) Plan of Care  Goal: Plan of Care Review  Outcome: Ongoing, Progressing  Goal: Patient-Specific Goal (Individualized)  Outcome: Ongoing, Progressing  Goal: Absence of New-Onset Illness or Injury  Outcome: Ongoing, Progressing  Goal: Optimal Comfort and Wellbeing  Outcome: Ongoing, Progressing  Goal: Readiness for Transition of Care  Outcome: Ongoing, Progressing

## 2022-10-01 PROCEDURE — 63600175 PHARM REV CODE 636 W HCPCS: Performed by: NURSE PRACTITIONER

## 2022-10-01 PROCEDURE — 25000003 PHARM REV CODE 250: Performed by: NURSE PRACTITIONER

## 2022-10-01 PROCEDURE — 11800000 HC REHAB PRIVATE ROOM

## 2022-10-01 RX ORDER — BISMUTH SUBSALICYLATE 525 MG/30ML
30 LIQUID ORAL ONCE
Status: DISCONTINUED | OUTPATIENT
Start: 2022-10-01 | End: 2022-10-03 | Stop reason: HOSPADM

## 2022-10-01 RX ORDER — BISMUTH SUBSALICYLATE 525 MG/30ML
30 LIQUID ORAL ONCE
Status: DISCONTINUED | OUTPATIENT
Start: 2022-10-01 | End: 2022-10-01

## 2022-10-01 RX ADMIN — ASPIRIN 81 MG 81 MG: 81 TABLET ORAL at 08:10

## 2022-10-01 RX ADMIN — ASPIRIN 81 MG 81 MG: 81 TABLET ORAL at 09:10

## 2022-10-01 RX ADMIN — ATORVASTATIN CALCIUM 40 MG: 40 TABLET, FILM COATED ORAL at 08:10

## 2022-10-01 RX ADMIN — ACETAMINOPHEN 500 MG: 500 TABLET ORAL at 11:10

## 2022-10-01 RX ADMIN — FLUTICASONE FUROATE AND VILANTEROL TRIFENATATE 1 PUFF: 200; 25 POWDER RESPIRATORY (INHALATION) at 08:10

## 2022-10-01 RX ADMIN — ONDANSETRON 4 MG: 4 TABLET, ORALLY DISINTEGRATING ORAL at 04:10

## 2022-10-01 RX ADMIN — GABAPENTIN 300 MG: 300 CAPSULE ORAL at 09:10

## 2022-10-01 RX ADMIN — LIDOCAINE 5% 1 PATCH: 700 PATCH TOPICAL at 05:10

## 2022-10-01 RX ADMIN — AMLODIPINE BESYLATE 5 MG: 5 TABLET ORAL at 08:10

## 2022-10-01 RX ADMIN — BUPROPION HYDROCHLORIDE 300 MG: 150 TABLET, FILM COATED, EXTENDED RELEASE ORAL at 08:10

## 2022-10-01 RX ADMIN — CARVEDILOL 12.5 MG: 6.25 TABLET, FILM COATED ORAL at 08:10

## 2022-10-01 RX ADMIN — MIRTAZAPINE 30 MG: 15 TABLET, FILM COATED ORAL at 09:10

## 2022-10-01 RX ADMIN — POLYETHYLENE GLYCOL 3350 17 G: 17 POWDER, FOR SOLUTION ORAL at 08:10

## 2022-10-01 RX ADMIN — FAMOTIDINE 20 MG: 20 TABLET ORAL at 08:10

## 2022-10-01 RX ADMIN — LISINOPRIL 20 MG: 10 TABLET ORAL at 08:10

## 2022-10-01 RX ADMIN — FAMOTIDINE 20 MG: 20 TABLET ORAL at 09:10

## 2022-10-01 RX ADMIN — METHOCARBAMOL 500 MG: 500 TABLET ORAL at 02:10

## 2022-10-01 RX ADMIN — CETIRIZINE HYDROCHLORIDE 10 MG: 10 TABLET, FILM COATED ORAL at 08:10

## 2022-10-01 RX ADMIN — METHOCARBAMOL 500 MG: 500 TABLET ORAL at 09:10

## 2022-10-01 RX ADMIN — ACETAMINOPHEN 500 MG: 500 TABLET ORAL at 09:10

## 2022-10-01 RX ADMIN — HYDROXYZINE PAMOATE 50 MG: 50 CAPSULE ORAL at 09:10

## 2022-10-01 RX ADMIN — DOCUSATE SODIUM 100 MG: 100 CAPSULE, LIQUID FILLED ORAL at 08:10

## 2022-10-01 RX ADMIN — CARVEDILOL 12.5 MG: 6.25 TABLET, FILM COATED ORAL at 09:10

## 2022-10-01 RX ADMIN — Medication 30 ML: at 08:10

## 2022-10-01 RX ADMIN — ENOXAPARIN SODIUM 40 MG: 40 INJECTION SUBCUTANEOUS at 04:10

## 2022-10-01 NOTE — PROGRESS NOTES
[facility] ([facility])  Rehab Progress Note    Patient Name: Yoan Xavier  MRN: [id number]  Age: 81 y.o. Sex: female  : 1940  Hospital Length of Stay: 8 days  Date of Service: 10/1/2022   Chief Complaint: Left femoral neck fracture 2/2 fall s/p left hip hemiarthroplasty on 2022    Subjective:     Basic Information  Admit Information: 81-year-old WF presented to [facility] on  complaints of left hip pain after falling doing laundry twelve days prior.  PMH significant for hypertension and hyperlipidemia.  Workup significant for left femoral neck fracture.  On  tolerated left hemiarthroplasty without perioperative complications. Tolerated transfer to [facility] inpatient rehab unit on  without incident.  Today's Information: No acute events overnight.  Sitting in chair comfortably.  Last BM .  Reports good sleep and appetite.  Vital signs at goal with no recent recorded fevers.  Bradycardia asymptomatic. No labs or imaging today.    Review of patient's allergies indicates:  No Known Allergies     Current Facility-Administered Medications:     acetaminophen tablet 500 mg, 500 mg, Oral, Q4H, Olga Herbert, FNP, 500 mg at 22 1506    amLODIPine tablet 5 mg, 5 mg, Oral, Daily, Olga Herbert, FNP, 5 mg at 10/01/22 0850    aspirin chewable tablet 81 mg, 81 mg, Oral, BID, Olga Herbert, FNP, 81 mg at 10/01/22 0850    atorvastatin tablet 40 mg, 40 mg, Oral, Daily, Olga Herbert, FNP, 40 mg at 10/01/22 0850    benzonatate capsule 100 mg, 100 mg, Oral, TID PRN, Olga Herbert, FNP    buPROPion TB24 tablet 300 mg, 300 mg, Oral, Daily, Olga Herbert, FNP, 300 mg at 10/01/22 0850    carvediloL tablet 12.5 mg, 12.5 mg, Oral, BID, Olga Herbert, FNP, 12.5 mg at 10/01/22 0850    cetirizine tablet 10 mg, 10 mg, Oral, Daily, Robert Vera FNP, 10 mg at 10/01/22 0850    docusate sodium capsule 100 mg, 100 mg, Oral, BID, WILFRID Cotter, 100 mg  at 10/01/22 0850    enoxaparin injection 40 mg, 40 mg, Subcutaneous, Daily, WILFRID Cotter, 40 mg at 09/30/22 1711    famotidine tablet 20 mg, 20 mg, Oral, BID, XOCHILT CotterP, 20 mg at 10/01/22 0850    famotidine tablet 20 mg, 20 mg, Oral, BID PRN, WILFRID Cotter, 20 mg at 09/28/22 0826    fluticasone furoate-vilanteroL 200-25 mcg/dose diskus inhaler 1 puff, 1 puff, Inhalation, Daily, 1 puff at 10/01/22 0858 **AND** MDI Daily, , , Daily, WILFRID Cotter    fluticasone propionate 50 mcg/actuation nasal spray 100 mcg, 2 spray, Each Nostril, BID, WILFRID Penaloza    gabapentin capsule 300 mg, 300 mg, Oral, QHS, XOCHILT CotterP, 300 mg at 09/30/22 2029    hydrALAZINE injection 10 mg, 10 mg, Intravenous, Q6H PRN, XOCHILT CotterP, 10 mg at 09/24/22 0456    hydrOXYzine pamoate capsule 50 mg, 50 mg, Oral, Nightly PRN, WILFRID Cotter, 50 mg at 09/25/22 2006    labetalol 20 mg/4 mL (5 mg/mL) IV syring, 10 mg, Intravenous, Q4H PRN, WILFRID Cotter    LIDOcaine 5 % patch 1 patch, 1 patch, Transdermal, Q24H, WILFRID Roblero, 1 patch at 10/01/22 0535    lisinopriL tablet 20 mg, 20 mg, Oral, Daily, Robert Vera, FNP, 20 mg at 10/01/22 0850    methocarbamoL tablet 500 mg, 500 mg, Oral, TID, Tanisha Painter, FNP, 500 mg at 09/29/22 1506    methocarbamoL tablet 750 mg, 750 mg, Oral, QID PRN, WILFRID Cotter    metoprolol injection 10 mg, 10 mg, Intravenous, Q2H PRN, WILFRID Cotter    mirtazapine tablet 30 mg, 30 mg, Oral, QHS, WILFRID Cotter, 30 mg at 09/30/22 2029    nitroGLYCERIN SL tablet 0.4 mg, 0.4 mg, Sublingual, Q5 Min PRN, WILFRID Cotter    ondansetron disintegrating tablet 4 mg, 4 mg, Oral, Q6H PRN, WILFRID Cotter    polyethylene glycol packet 17 g, 17 g, Oral, BID, WILFRID Cotter, 17 g at 10/01/22 0850    traMADoL tablet 50 mg, 50 mg, Oral, Q4H PRN, WILFRID Cotter, 50 mg at 09/28/22 0827     Review of  "Systems   Complete 12-point review of symptoms negative except for what's mentioned in HPI     Objective:     /63   Pulse (!) 56   Temp 97.5 °F (36.4 °C)   Resp 18   Ht 5' 8" (1.727 m)   Wt 64.2 kg (141 lb 8.6 oz)   SpO2 (!) 92%   BMI 21.52 kg/m²        Intake/Output Summary (Last 24 hours) at 10/1/2022 1006  Last data filed at 10/1/2022 0800  Gross per 24 hour   Intake 840 ml   Output --   Net 840 ml       Physical Exam  Constitutional:       Appearance: Normal appearance.   HENT:      Head: Normocephalic.      Mouth/Throat:      Mouth: Mucous membranes are moist.   Eyes:      Pupils: Pupils are equal, round, and reactive to light.   Cardiovascular:      Rate and Rhythm: Normal rate and regular rhythm.      Heart sounds: Normal heart sounds.   Pulmonary:      Effort: Pulmonary effort is normal.      Breath sounds: Normal breath sounds.   Abdominal:      General: Bowel sounds are normal.      Palpations: Abdomen is soft.   Musculoskeletal:      Cervical back: Neck supple.      Comments: Generalized weakness and muscle atrophy.  Left hip dressing clean and intact.   Skin:     General: Skin is warm and dry.   Neurological:      General: No focal deficit present.      Mental Status: She is alert and oriented to person, place, and time.   Psychiatric:         Mood and Affect: Mood normal.         Behavior: Behavior normal.         Thought Content: Thought content normal.         Judgment: Judgment normal.       Lines/Drains/Airways       Epidural Line  Duration                  Neuraxial Analgesia/Anesthesia Assessment (using dermatomes) Epidural 09/21/22 1236 9 days                  Labs  No results found for this or any previous visit (from the past 24 hour(s)).    Radiology  Left hip x-ray 9/22:  Good position of left hip prosthesis    Assessment/Plan:     81 y.o. WM admitted on 9/23/2022    Left femoral neck fracture 2/2 fall   - s/p left hip hemiarthroplasty on 09/21/2022  - LLE WBAT  - continue        "         Aspirin 81 mg b.i.d.                 Methocarbamol 750 mg 4 times daily p.r.n. muscle spasms                 Gabapentin 300 mg at bedtime                Tylenol 500 mg every 4 hours                 Tramadol 50 mg every 4 hours p.r.n.  - follow-up outpatient with orthopedic surgery     GERD  - Avoid spicy foods, and nothing to eat or drink within x2 hours of bedtime or laying flat (water is ok)   - Avoid NSAIDs (Advil, ibuprofen, naproxen...) and resendiz-2 inhibitors (Mobic, Celebrex)    - continue                Pepcid 20 mg b.i.d.     HTN  - BP mod control!!  - continue                Lisinopril 20 mg daily (increased 9/24)                Amlodipine 5 mg daily                 Coreg 12.5 mg b.i.d.                Hydralazine 10 mg every 2 hours as needed for BP > 160/90                Labetalol 10 mg every 2 hours as needed for BP > 160/90     HLD  - FLP with next labs  - continue                Atorvastatin 40 mg daily     Major depressive disorder  - In remission   - Continue                Wellbutrin 300 mg daily      Constipation  - Stable   - Continue                 MiraLax 17 g b.i.d. (increased 9/27)                Colace 100 mg b.i.d.     Insomnia   - Stable   - Continue                Mirtazapine 30 mg nightly      COPD   - Stable   - Continue                Symbicort 2 puffs daily     Tobacco abuse   - Current  - Smokes 1 pack of cigarettes daily  - discontinued Nicotine patch on 9/28  - Counseled on smoking cessation     Normocytic anemia  - asymptomatic  - H/H stable   - no evidence of active bleeds  - will closely monitor and transfuse if needed     Sinus congestion   - improved  - continue  Zyrtec 10 mg daily (initiated 9/28)  Flonase 100 mcg b.i.d. (initiated 9/28)     VTE Prophylaxis:  Lovenox 40 mg daily  COVID-19 testing: Negative 9/23/2022  COVID-19 vaccination status:  Unvaccinated     POA: no  Living will: no  Contacts: Namrata Yu (sister) [phone]                     Lex Yu  (brother-in-law) [phone]     CODE STATUS: Full  Internal Medicine (attending): Vasiliy Connell MD  Physiatry (consulting):  Vernon Manrique MD     OUTPATIENT PROVIDERS  PCP: Ronal Zuluaga MD  Orthopedic surgery:  Christos Christensen MD     DISPOSITION: Condition stable.  Vital signs at goal.  No labs today.  Bowel management, sleep hygiene, and appetite at goal.  Continues to progress well with therapies.  Monitor closely.  Notify of any acute changes.    Staffing 9/27: Continent of bowel and bladder. Good appetite. RT: overall supervision. PT: overall supervision. OT: maintains precautions, overall supervision to partial mod. Projected discharge 10/3.    Total time spent on this encounter including chart review and direct 1-on-1 patient interaction: 36 minutes   Over 50% of this time was spent in counseling and coordination of care

## 2022-10-01 NOTE — PLAN OF CARE
Problem: Rehabilitation (IRF) Plan of Care  Goal: Plan of Care Review  Outcome: Ongoing, Progressing  Flowsheets (Taken 10/1/2022 1756)  Plan of Care Reviewed With: patient  Goal: Absence of New-Onset Illness or Injury  Outcome: Ongoing, Progressing  Intervention: Prevent Fall and Fall Injury  Flowsheets (Taken 10/1/2022 1756)  Safety Promotion/Fall Prevention:   assistive device/personal item within reach   chair alarm set   bed alarm set   Fall Risk reviewed with patient/family   gait belt with ambulation   Fall Risk signage in place   high risk medications identified   in recliner, wheels locked   lighting adjusted   medications reviewed   nonskid shoes/socks when out of bed   side rails raised x 2   instructed to call staff for mobility  Intervention: Prevent Skin Injury  Flowsheets (Taken 10/1/2022 1756)  Skin Protection: incontinence pads utilized  Intervention: Prevent Infection  Flowsheets (Taken 10/1/2022 1756)  Infection Prevention:   environmental surveillance performed   equipment surfaces disinfected   hand hygiene promoted   single patient room provided   rest/sleep promoted   personal protective equipment utilized  Intervention: Prevent VTE (Venous Thromboembolism)  Flowsheets (Taken 10/1/2022 1756)  VTE Prevention/Management:   remove, assess skin, and reapply compression stockings   ambulation promoted   bleeding precations maintained   bleeding risk assessed   dorsiflexion/plantar flexion performed   fluids promoted  Goal: Optimal Comfort and Wellbeing  Outcome: Ongoing, Progressing  Intervention: Provide Person-Centered Care  Flowsheets (Taken 10/1/2022 1756)  Trust Relationship/Rapport:   care explained   emotional support provided   empathic listening provided   questions answered   questions encouraged   reassurance provided   thoughts/feelings acknowledged  Intervention: Monitor Pain and Promote Comfort  Flowsheets (Taken 10/1/2022 1756)  Pain Management Interventions:   medication offered    pain management plan reviewed with patient/caregiver   pillow support provided   position adjusted   prescribed exercises encouraged   premedicated for activity   quiet environment facilitated   relaxation techniques promoted     Problem: Fall Injury Risk  Goal: Absence of Fall and Fall-Related Injury  Outcome: Ongoing, Progressing  Intervention: Identify and Manage Contributors  Flowsheets (Taken 10/1/2022 1756)  Self-Care Promotion:   BADL personal objects within reach   independence encouraged   BADL personal routines maintained   safe use of adaptive equipment encouraged  Medication Review/Management:   medications reviewed   high-risk medications identified  Intervention: Promote Injury-Free Environment  Flowsheets (Taken 10/1/2022 1756)  Safety Promotion/Fall Prevention:   assistive device/personal item within reach   chair alarm set   bed alarm set   Fall Risk reviewed with patient/family   gait belt with ambulation   Fall Risk signage in place   high risk medications identified   in recliner, wheels locked   lighting adjusted   medications reviewed   nonskid shoes/socks when out of bed   side rails raised x 2   instructed to call staff for mobility

## 2022-10-02 PROCEDURE — 25000003 PHARM REV CODE 250: Performed by: NURSE PRACTITIONER

## 2022-10-02 PROCEDURE — 97530 THERAPEUTIC ACTIVITIES: CPT

## 2022-10-02 PROCEDURE — 11800000 HC REHAB PRIVATE ROOM

## 2022-10-02 PROCEDURE — 97116 GAIT TRAINING THERAPY: CPT

## 2022-10-02 PROCEDURE — 63600175 PHARM REV CODE 636 W HCPCS: Performed by: NURSE PRACTITIONER

## 2022-10-02 RX ADMIN — ATORVASTATIN CALCIUM 40 MG: 40 TABLET, FILM COATED ORAL at 09:10

## 2022-10-02 RX ADMIN — FAMOTIDINE 20 MG: 20 TABLET ORAL at 09:10

## 2022-10-02 RX ADMIN — ACETAMINOPHEN 500 MG: 500 TABLET ORAL at 05:10

## 2022-10-02 RX ADMIN — METHOCARBAMOL 500 MG: 500 TABLET ORAL at 05:10

## 2022-10-02 RX ADMIN — LISINOPRIL 20 MG: 10 TABLET ORAL at 09:10

## 2022-10-02 RX ADMIN — AMLODIPINE BESYLATE 5 MG: 5 TABLET ORAL at 09:10

## 2022-10-02 RX ADMIN — CETIRIZINE HYDROCHLORIDE 10 MG: 10 TABLET, FILM COATED ORAL at 09:10

## 2022-10-02 RX ADMIN — FLUTICASONE FUROATE AND VILANTEROL TRIFENATATE 1 PUFF: 200; 25 POWDER RESPIRATORY (INHALATION) at 09:10

## 2022-10-02 RX ADMIN — FAMOTIDINE 20 MG: 20 TABLET ORAL at 08:10

## 2022-10-02 RX ADMIN — ENOXAPARIN SODIUM 40 MG: 40 INJECTION SUBCUTANEOUS at 05:10

## 2022-10-02 RX ADMIN — CARVEDILOL 12.5 MG: 6.25 TABLET, FILM COATED ORAL at 08:10

## 2022-10-02 RX ADMIN — GABAPENTIN 300 MG: 300 CAPSULE ORAL at 08:10

## 2022-10-02 RX ADMIN — ACETAMINOPHEN 500 MG: 500 TABLET ORAL at 09:10

## 2022-10-02 RX ADMIN — ASPIRIN 81 MG 81 MG: 81 TABLET ORAL at 09:10

## 2022-10-02 RX ADMIN — ACETAMINOPHEN 500 MG: 500 TABLET ORAL at 02:10

## 2022-10-02 RX ADMIN — METHOCARBAMOL 500 MG: 500 TABLET ORAL at 02:10

## 2022-10-02 RX ADMIN — BUPROPION HYDROCHLORIDE 300 MG: 150 TABLET, FILM COATED, EXTENDED RELEASE ORAL at 09:10

## 2022-10-02 RX ADMIN — ASPIRIN 81 MG 81 MG: 81 TABLET ORAL at 08:10

## 2022-10-02 RX ADMIN — CARVEDILOL 12.5 MG: 6.25 TABLET, FILM COATED ORAL at 09:10

## 2022-10-02 RX ADMIN — MIRTAZAPINE 30 MG: 15 TABLET, FILM COATED ORAL at 08:10

## 2022-10-02 NOTE — PLAN OF CARE
Problem: Rehabilitation (IRF) Plan of Care  Goal: Plan of Care Review  Outcome: Ongoing, Progressing  Flowsheets (Taken 10/2/2022 1118)  Plan of Care Reviewed With: patient  Goal: Absence of New-Onset Illness or Injury  Outcome: Ongoing, Progressing  Intervention: Prevent Fall and Fall Injury  Flowsheets (Taken 10/2/2022 1118)  Safety Promotion/Fall Prevention:   assistive device/personal item within reach   chair alarm set   Fall Risk reviewed with patient/family   Fall Risk signage in place   gait belt with ambulation   in recliner, wheels locked   high risk medications identified   medications reviewed   lighting adjusted   nonskid shoes/socks when out of bed   instructed to call staff for mobility   side rails raised x 2  Intervention: Prevent Skin Injury  Flowsheets (Taken 10/2/2022 1118)  Skin Protection: incontinence pads utilized  Intervention: Prevent Infection  Flowsheets (Taken 10/2/2022 1118)  Infection Prevention:   environmental surveillance performed   equipment surfaces disinfected   hand hygiene promoted   personal protective equipment utilized   rest/sleep promoted   single patient room provided  Intervention: Prevent VTE (Venous Thromboembolism)  Flowsheets (Taken 10/2/2022 1118)  VTE Prevention/Management:   ambulation promoted   bleeding precations maintained   bleeding risk assessed   bleeding risk factor(s) identified, provider notified   dorsiflexion/plantar flexion performed   remove, assess skin, and reapply compression stockings   fluids promoted  Goal: Optimal Comfort and Wellbeing  Outcome: Ongoing, Progressing  Intervention: Provide Person-Centered Care  Flowsheets (Taken 10/2/2022 1118)  Trust Relationship/Rapport:   care explained   choices provided   emotional support provided   empathic listening provided   questions answered   questions encouraged   reassurance provided   thoughts/feelings acknowledged  Intervention: Monitor Pain and Promote Comfort  Flowsheets (Taken 10/2/2022  1118)  Pain Management Interventions:   medication offered but refused   pain management plan reviewed with patient/caregiver   pillow support provided   position adjusted   prescribed exercises encouraged   relaxation techniques promoted     Problem: Fall Injury Risk  Goal: Absence of Fall and Fall-Related Injury  Outcome: Ongoing, Progressing  Intervention: Identify and Manage Contributors  Flowsheets (Taken 10/2/2022 1118)  Self-Care Promotion:   independence encouraged   BADL personal routines maintained   BADL personal objects within reach   safe use of adaptive equipment encouraged  Medication Review/Management:   medications reviewed   high-risk medications identified  Intervention: Promote Injury-Free Environment  Flowsheets (Taken 10/2/2022 1118)  Safety Promotion/Fall Prevention:   assistive device/personal item within reach   chair alarm set   Fall Risk reviewed with patient/family   Fall Risk signage in place   gait belt with ambulation   in recliner, wheels locked   high risk medications identified   medications reviewed   lighting adjusted   nonskid shoes/socks when out of bed   instructed to call staff for mobility   side rails raised x 2

## 2022-10-02 NOTE — PROGRESS NOTES
[facility] ([facility])  Rehab Progress Note    Patient Name: Yoan Xavier  MRN: [id number]  Age: 81 y.o. Sex: female  : 1940  Hospital Length of Stay: 9 days  Date of Service: 10/2/2022   Chief Complaint: Left femoral neck fracture 2/2 fall s/p left hip hemiarthroplasty on 2022    Subjective:     Basic Information  Admit Information: 81-year-old WF presented to [facility] on  complaints of left hip pain after falling doing laundry twelve days prior.  PMH significant for hypertension and hyperlipidemia.  Workup significant for left femoral neck fracture.  On  tolerated left hemiarthroplasty without perioperative complications. Tolerated transfer to [facility] inpatient rehab unit on  without incident.  Today's Information: No acute events overnight.  Sitting in chair comfortably.  Last BM 10/1.  Reports good sleep and appetite.  Vital signs at goal with no recent recorded fevers.  Bradycardia asymptomatic. No labs or imaging today.    Review of patient's allergies indicates:  No Known Allergies     Current Facility-Administered Medications:     acetaminophen tablet 500 mg, 500 mg, Oral, Q4H, Olga Herbert, FNP, 500 mg at 10/02/22 0509    amLODIPine tablet 5 mg, 5 mg, Oral, Daily, Olga Herbert, FNP, 5 mg at 10/01/22 0850    aspirin chewable tablet 81 mg, 81 mg, Oral, BID, Olga Herbert, FNP, 81 mg at 10/01/22 2120    atorvastatin tablet 40 mg, 40 mg, Oral, Daily, Olga Herbert, FNP, 40 mg at 10/01/22 0850    benzonatate capsule 100 mg, 100 mg, Oral, TID PRN, Olga Herbert, FNP    bismuth subsalicylate 262 mg/15 mL suspension 30 mL, 30 mL, Oral, Once, Vasiliy Connell MD    buPROPion TB24 tablet 300 mg, 300 mg, Oral, Daily, Olga Herbert FNP, 300 mg at 10/01/22 0850    carvediloL tablet 12.5 mg, 12.5 mg, Oral, BID, Olga Herbert FNP, 12.5 mg at 10/01/22 2120    cetirizine tablet 10 mg, 10 mg, Oral, Daily, WILFRID Penaloza, 10 mg at  10/01/22 0850    docusate sodium capsule 100 mg, 100 mg, Oral, BID, Olga Herbert, XOCHILTP, 100 mg at 10/01/22 0850    enoxaparin injection 40 mg, 40 mg, Subcutaneous, Daily, Olga Herbert, FNP, 40 mg at 10/01/22 1655    famotidine tablet 20 mg, 20 mg, Oral, BID, Olga Herbert, FNP, 20 mg at 10/01/22 2121    famotidine tablet 20 mg, 20 mg, Oral, BID PRN, Olga Herbert, FNP, 20 mg at 09/28/22 0826    fluticasone furoate-vilanteroL 200-25 mcg/dose diskus inhaler 1 puff, 1 puff, Inhalation, Daily, 1 puff at 10/01/22 0858 **AND** MDI Daily, , , Daily, XOCHILT CotterP    fluticasone propionate 50 mcg/actuation nasal spray 100 mcg, 2 spray, Each Nostril, BID, WILFRID Penaloza    gabapentin capsule 300 mg, 300 mg, Oral, QHS, Olga Herbert FNP, 300 mg at 10/01/22 2121    hydrALAZINE injection 10 mg, 10 mg, Intravenous, Q6H PRN, XOCHILT CotterP, 10 mg at 09/24/22 0456    hydrOXYzine pamoate capsule 50 mg, 50 mg, Oral, Nightly PRN, Olga Herbert FNP, 50 mg at 10/01/22 2121    labetalol 20 mg/4 mL (5 mg/mL) IV syring, 10 mg, Intravenous, Q4H PRN, Olga Herbert, FNP    LIDOcaine 5 % patch 1 patch, 1 patch, Transdermal, Q24H, Tanisha Painter, XOCHILTP, 1 patch at 10/01/22 0535    lisinopriL tablet 20 mg, 20 mg, Oral, Daily, Robert Vera, FNP, 20 mg at 10/01/22 0850    methocarbamoL tablet 500 mg, 500 mg, Oral, TID, Tanisha Painter, FNP, 500 mg at 10/02/22 0509    methocarbamoL tablet 750 mg, 750 mg, Oral, QID PRN, WILFRID Cotter    metoprolol injection 10 mg, 10 mg, Intravenous, Q2H PRN, WILFRID Cotter    mirtazapine tablet 30 mg, 30 mg, Oral, QHS, WILFRID Cotter, 30 mg at 10/01/22 2120    nitroGLYCERIN SL tablet 0.4 mg, 0.4 mg, Sublingual, Q5 Min PRN, WILFRID Cotter    ondansetron disintegrating tablet 4 mg, 4 mg, Oral, Q6H PRN, WILFRID Cotter, 4 mg at 10/01/22 1655    polyethylene glycol packet 17 g, 17 g, Oral, BID, WILFRID Cotter, 17 g at  "10/01/22 0850    traMADoL tablet 50 mg, 50 mg, Oral, Q4H PRN, Olga Herbert, FNP, 50 mg at 09/28/22 0827     Review of Systems   Complete 12-point review of symptoms negative except for what's mentioned in HPI     Objective:     /69   Pulse (!) 56   Temp 97.5 °F (36.4 °C) (Oral)   Resp 18   Ht 5' 8" (1.727 m)   Wt 64.2 kg (141 lb 8.6 oz)   SpO2 (!) 93%   BMI 21.52 kg/m²        Intake/Output Summary (Last 24 hours) at 10/2/2022 0953  Last data filed at 10/2/2022 0800  Gross per 24 hour   Intake 420 ml   Output --   Net 420 ml       Physical Exam  Constitutional:       Appearance: Normal appearance.   HENT:      Head: Normocephalic.      Mouth/Throat:      Mouth: Mucous membranes are moist.   Eyes:      Pupils: Pupils are equal, round, and reactive to light.   Cardiovascular:      Rate and Rhythm: Normal rate and regular rhythm.      Heart sounds: Normal heart sounds.   Pulmonary:      Effort: Pulmonary effort is normal.      Breath sounds: Normal breath sounds.   Abdominal:      General: Bowel sounds are normal.      Palpations: Abdomen is soft.   Musculoskeletal:      Cervical back: Neck supple.      Comments: Generalized weakness and muscle atrophy.  Left hip dressing clean and intact.   Skin:     General: Skin is warm and dry.   Neurological:      General: No focal deficit present.      Mental Status: She is alert and oriented to person, place, and time.   Psychiatric:         Mood and Affect: Mood normal.         Behavior: Behavior normal.         Thought Content: Thought content normal.         Judgment: Judgment normal.       Lines/Drains/Airways       Epidural Line  Duration                  Neuraxial Analgesia/Anesthesia Assessment (using dermatomes) Epidural 09/21/22 1236 10 days                  Labs  No results found for this or any previous visit (from the past 24 hour(s)).    Radiology  Left hip x-ray 9/22:  Good position of left hip prosthesis    Assessment/Plan:     81 y.o. WM admitted " on 9/23/2022    Left femoral neck fracture 2/2 fall   - s/p left hip hemiarthroplasty on 09/21/2022  - LLE WBAT  - continue                Aspirin 81 mg b.i.d.                 Methocarbamol 750 mg 4 times daily p.r.n. muscle spasms                 Gabapentin 300 mg at bedtime                Tylenol 500 mg every 4 hours                 Tramadol 50 mg every 4 hours p.r.n.  - follow-up outpatient with orthopedic surgery     GERD  - Avoid spicy foods, and nothing to eat or drink within x2 hours of bedtime or laying flat (water is ok)   - Avoid NSAIDs (Advil, ibuprofen, naproxen...) and resendiz-2 inhibitors (Mobic, Celebrex)    - continue                Pepcid 20 mg b.i.d.     HTN  - BP mod control!!  - continue                Lisinopril 20 mg daily (increased 9/24)                Amlodipine 5 mg daily                 Coreg 12.5 mg b.i.d.                Hydralazine 10 mg every 2 hours as needed for BP > 160/90                Labetalol 10 mg every 2 hours as needed for BP > 160/90     HLD  - FLP with next labs  - continue                Atorvastatin 40 mg daily     Major depressive disorder  - In remission   - Continue                Wellbutrin 300 mg daily      Constipation  - Stable   - Continue                 MiraLax 17 g b.i.d. (increased 9/27)                Colace 100 mg b.i.d.     Insomnia   - Stable   - Continue                Mirtazapine 30 mg nightly      COPD   - Stable   - Continue                Symbicort 2 puffs daily     Tobacco abuse   - Current  - Smokes 1 pack of cigarettes daily  - discontinued Nicotine patch on 9/28  - Counseled on smoking cessation     Normocytic anemia  - asymptomatic  - H/H stable   - no evidence of active bleeds  - will closely monitor and transfuse if needed     Sinus congestion   - improved  - continue  Zyrtec 10 mg daily (initiated 9/28)  Flonase 100 mcg b.i.d. (initiated 9/28)     VTE Prophylaxis:  Lovenox 40 mg daily  COVID-19 testing: Negative 9/23/2022  COVID-19 vaccination  status:  Unvaccinated     POA: no  Living will: no  Contacts: Namrata Yu (sister) [phone]                     Lex Yu (brother-in-law) [phone]     CODE STATUS: Full  Internal Medicine (attending): Vasiliy Connell MD  Physiatry (consulting):  Vernon Manrique MD     OUTPATIENT PROVIDERS  PCP: Ronal Zuluaga MD  Orthopedic surgery:  Christos Christensen MD     DISPOSITION: Condition stable.  Vital signs at goal.  Asymptomatic bradycardia. No labs today.  Bowel management, sleep hygiene, and appetite at goal.  Continues to progress well with therapies.  Monitor closely.  Notify of any acute changes. Medications thoroughly reviewed, labs in a.m.    Staffing 9/27: Continent of bowel and bladder. Good appetite. RT: overall supervision. PT: overall supervision. OT: maintains precautions, overall supervision to partial mod. Projected discharge 10/3.    Total time spent on this encounter including chart review and direct 1-on-1 patient interaction: 37 minutes   Over 50% of this time was spent in counseling and coordination of care

## 2022-10-02 NOTE — PLAN OF CARE
Problem: Physical Therapy  Goal: Physical Therapy Goal  Description: Short Term goals      Bed Mobility   Roll Right and Left Independent (MET)  Lying to sitting Independent (MET)  Sitting to lying Independent (MET)    Transfers    Pt will be able to perform Stand step chair/bed to chair transfer With RW Independent (MET)  Pt will be able to perform Sit to stand with RW Independent (MET)  Pt will be able to perform Car transfer with RW Independent (MET)    Ambulation    Pt will ambulate 500 Feet with RW Independent (Progressing)  Pt will be able to ascend/descend 12 stairs using 1HR Rails Independent (Progressing)  Pt will be able to ascend/descend 6 inch curb using RW Independent (Progressing)  Pt will be able to ambulate uneven surfaces/ramp 10 feet with RW Independent (Progressing)          Timeframe: By Discharge     Outcome: Ongoing, Progressing

## 2022-10-02 NOTE — PT/OT/SLP PROGRESS
Physical Therapy Inpatient Rehab Treatment    Patient Name:  Yoan Xavier   MRN:  [id number]    Recommendations:     Discharge Recommendations:  other (see comments) (pending)   Discharge Equipment Recommendations: walker, rolling   Barriers to discharge: Decreased caregiver support    Assessment:     Yoan Xavier is a 81 y.o. female admitted with a medical diagnosis of Closed displaced midcervical fracture of left femur with routine healing.  She presents with the following impairments/functional limitations:    decreased strength and endurance.    Rehab Diagnosis:     Recent Surgery: * No surgery found *      General Precautions: Standard, fall     Orthopedic Precautions:LLE weight bearing as tolerated, LLE posterior precautions     Braces: N/A    Rehab Prognosis: Good; patient would benefit from acute skilled PT services to address these deficits and reach maximum level of function.      History:     Past Medical History:   Diagnosis Date    COPD (chronic obstructive pulmonary disease)     Depression     Hypertension     Insomnia     Lumbar degenerative disc disease     Mixed hyperlipidemia        Past Surgical History:   Procedure Laterality Date    cyst on tailbone      HEMIARTHROPLASTY, HIP, POSTERIOR APPROACH Left 9/21/2022    Procedure: HEMIARTHROPLASTY, HIP, POSTERIOR APPROACH;  Surgeon: Christos Christensen MD;  Location: [facility];  Service: Orthopedics;  Laterality: Left;    right hip replacement         Subjective     Chief Complaint: none  Patient/Family Comments/goals: to go home    Vitals   Vitals at Rest  BP    HR    O2 Sat    Pain      Vitals With Activity  BP    HR    O2 Sat    Pain      Respiratory Status: Room air    Patients cultural, spiritual, Yarsani conflicts given the current situation:        Objective:     Communicated with NSG prior to session.  Patient found supine with    upon PT entry to room.    Pt is Oriented x3 and Alert.    Functional Mobility:   Bed Mobility:     Rolling  Left:  independence  Rolling Right: independence  Scooting: independence  Bridging: independence  Supine to Sit: independence  Sit to Supine: independence  Transfers:     Sit to Stand:  independence with no AD  Bed to Chair: modified independence with  rolling walker  using  Stand Pivot  Car Transfer: modified independence with  rolling walker  using  Stand Pivot  Gait: Pt ambulates 300 feet with RW with Modified [place].   12 stairs with bilateral handrails with Supervision  6 inch curb with rolling walker with Supervision   an object from the ground in standing position with rolling walker with Supervision  Wheelchair Propulsion:  Pt propelled Standard wheelchair x 300 feet on Level tile with  Bilateral upper extremity with Independent.      Current   Status  Discharge   Goal   Functional Area: Care Score:    Roll Left and Right 6 Independent   Sit to Lying 6 Independent   Lying to Sitting on Side of Bed 6 Independent   Sit to Stand 6 Independent   Chair/Bed-to-Chair Transfer 6 Independent   Car Transfer 6 Independent   Walk 10 Feet 6 Independent   Walk 50 Feet with Two Turns 6 Independent   Walk 150 Feet 6 Independent   Walk 10 Feet Uneven Surface 4 Independent   1 Step (Curb) 4 Independent   4 Steps 4 Independent   12 Steps 4 Independent   Picking Up Object 4     Wheel 50 Feet with Two Turns 6     Wheel 150 Feet 6         Therapeutic Activities and Exercises:      Activity Tolerance    Patient left up in chair with call button in reach.    Education provided: roles and goals of PT/PTA, transfer training, gait training, stair training, balance training, and safety awareness    Expected compliance:    GOALS:   Multidisciplinary Problems       Physical Therapy Goals          Problem: Physical Therapy    Goal Priority Disciplines Outcome Goal Variances Interventions   Physical Therapy Goal     PT, PT/OT Ongoing, Progressing     Description: Short Term goals      Bed Mobility   Roll Right and Left  Independent (MET)  Lying to sitting Independent (MET)  Sitting to lying Independent (MET)    Transfers    Pt will be able to perform Stand step chair/bed to chair transfer With RW Independent (MET)  Pt will be able to perform Sit to stand with RW Independent (MET)  Pt will be able to perform Car transfer with RW Independent (MET)    Ambulation    Pt will ambulate 500 Feet with RW Independent (Progressing)  Pt will be able to ascend/descend 12 stairs using 1HR Rails Independent (Progressing)  Pt will be able to ascend/descend 6 inch curb using RW Independent (Progressing)  Pt will be able to ambulate uneven surfaces/ramp 10 feet with RW Independent (Progressing)          Timeframe: By Discharge                          Plan:     During this hospitalization, patient to be seen 5 x/week to address the identified rehab impairments via gait training, therapeutic activities, therapeutic exercises, neuromuscular re-education and progress toward the following goals:    Plan of Care Expires:  09/30/22    Additional Information:         Time Tracking:     PT Received On: 10/02/22  Time In 0930     Time Out 1000  Total Time 30 min  Therapy Time PT Individual: 30  Missed Time:    Time Missed due to:      Billable Minutes: Gait Training 15 and Therapeutic Activity 15    10/02/2022

## 2022-10-03 VITALS
TEMPERATURE: 98 F | RESPIRATION RATE: 19 BRPM | SYSTOLIC BLOOD PRESSURE: 135 MMHG | WEIGHT: 141.56 LBS | OXYGEN SATURATION: 94 % | BODY MASS INDEX: 21.45 KG/M2 | HEART RATE: 58 BPM | HEIGHT: 68 IN | DIASTOLIC BLOOD PRESSURE: 66 MMHG

## 2022-10-03 LAB
ALBUMIN SERPL-MCNC: 2.7 GM/DL (ref 3.4–4.8)
ALBUMIN/GLOB SERPL: 0.8 RATIO (ref 1.1–2)
ALP SERPL-CCNC: 68 UNIT/L (ref 40–150)
ALT SERPL-CCNC: 13 UNIT/L (ref 0–55)
AST SERPL-CCNC: 11 UNIT/L (ref 5–34)
BASOPHILS # BLD AUTO: 0.04 X10(3)/MCL (ref 0–0.2)
BASOPHILS NFR BLD AUTO: 0.4 %
BILIRUBIN DIRECT+TOT PNL SERPL-MCNC: 0.5 MG/DL
BUN SERPL-MCNC: 19.8 MG/DL (ref 9.8–20.1)
CALCIUM SERPL-MCNC: 9.7 MG/DL (ref 8.4–10.2)
CHLORIDE SERPL-SCNC: 103 MMOL/L (ref 98–107)
CO2 SERPL-SCNC: 28 MMOL/L (ref 23–31)
CREAT SERPL-MCNC: 0.71 MG/DL (ref 0.55–1.02)
EOSINOPHIL # BLD AUTO: 0.14 X10(3)/MCL (ref 0–0.9)
EOSINOPHIL NFR BLD AUTO: 1.3 %
ERYTHROCYTE [DISTWIDTH] IN BLOOD BY AUTOMATED COUNT: 13.8 % (ref 11.5–17)
GFR SERPLBLD CREATININE-BSD FMLA CKD-EPI: >60 MLS/MIN/1.73/M2
GLOBULIN SER-MCNC: 3.6 GM/DL (ref 2.4–3.5)
GLUCOSE SERPL-MCNC: 83 MG/DL (ref 82–115)
HCT VFR BLD AUTO: 31 % (ref 37–47)
HGB BLD-MCNC: 10.1 GM/DL (ref 12–16)
IMM GRANULOCYTES # BLD AUTO: 0.03 X10(3)/MCL (ref 0–0.04)
IMM GRANULOCYTES NFR BLD AUTO: 0.3 %
LYMPHOCYTES # BLD AUTO: 1.56 X10(3)/MCL (ref 0.6–4.6)
LYMPHOCYTES NFR BLD AUTO: 14.7 %
MAGNESIUM SERPL-MCNC: 1.9 MG/DL (ref 1.6–2.6)
MCH RBC QN AUTO: 29.6 PG (ref 27–31)
MCHC RBC AUTO-ENTMCNC: 32.6 MG/DL (ref 33–36)
MCV RBC AUTO: 90.9 FL (ref 80–94)
MONOCYTES # BLD AUTO: 0.68 X10(3)/MCL (ref 0.1–1.3)
MONOCYTES NFR BLD AUTO: 6.4 %
NEUTROPHILS # BLD AUTO: 8.2 X10(3)/MCL (ref 2.1–9.2)
NEUTROPHILS NFR BLD AUTO: 76.9 %
NRBC BLD AUTO-RTO: 0 %
PHOSPHATE SERPL-MCNC: 2.9 MG/DL (ref 2.3–4.7)
PLATELET # BLD AUTO: 403 X10(3)/MCL (ref 130–400)
PMV BLD AUTO: 8.9 FL (ref 7.4–10.4)
POTASSIUM SERPL-SCNC: 4.5 MMOL/L (ref 3.5–5.1)
PREALB SERPL-MCNC: 15.9 MG/DL (ref 14–37)
PROT SERPL-MCNC: 6.3 GM/DL (ref 5.8–7.6)
RBC # BLD AUTO: 3.41 X10(6)/MCL (ref 4.2–5.4)
SODIUM SERPL-SCNC: 139 MMOL/L (ref 136–145)
WBC # SPEC AUTO: 10.6 X10(3)/MCL (ref 4.5–11.5)

## 2022-10-03 PROCEDURE — 83735 ASSAY OF MAGNESIUM: CPT | Performed by: NURSE PRACTITIONER

## 2022-10-03 PROCEDURE — 25000003 PHARM REV CODE 250: Performed by: NURSE PRACTITIONER

## 2022-10-03 PROCEDURE — 97116 GAIT TRAINING THERAPY: CPT

## 2022-10-03 PROCEDURE — 85025 COMPLETE CBC W/AUTO DIFF WBC: CPT | Performed by: NURSE PRACTITIONER

## 2022-10-03 PROCEDURE — 97530 THERAPEUTIC ACTIVITIES: CPT

## 2022-10-03 PROCEDURE — 84134 ASSAY OF PREALBUMIN: CPT | Performed by: NURSE PRACTITIONER

## 2022-10-03 PROCEDURE — 84100 ASSAY OF PHOSPHORUS: CPT | Performed by: NURSE PRACTITIONER

## 2022-10-03 PROCEDURE — 80053 COMPREHEN METABOLIC PANEL: CPT | Performed by: NURSE PRACTITIONER

## 2022-10-03 PROCEDURE — 36415 COLL VENOUS BLD VENIPUNCTURE: CPT | Performed by: NURSE PRACTITIONER

## 2022-10-03 PROCEDURE — 97535 SELF CARE MNGMENT TRAINING: CPT

## 2022-10-03 RX ADMIN — ACETAMINOPHEN 500 MG: 500 TABLET ORAL at 05:10

## 2022-10-03 NOTE — PLAN OF CARE
Discharging today as planned.    Alerted [facility] ([facility]) via [facility].  Added staple removal to HH nursing/PT/OT orders.  Will send AVS once completed.    Asked nursing to call report to [facility] staff before releasing pt.    No home DME ordered.    Family training completed.

## 2022-10-03 NOTE — PT/OT/SLP PROGRESS
Physical Therapy Inpatient Rehab Treatment    Patient Name:  Yoan Xavier   MRN:  [id number]    Recommendations:     Discharge Recommendations:  other (see comments) (pending)   Discharge Equipment Recommendations: walker, rolling   Barriers to discharge: None    Assessment:     Yoan Xavier is a 81 y.o. female admitted with a medical diagnosis of Closed displaced midcervical fracture of left femur with routine healing.  She presents with the following impairments/functional limitations:  weakness, impaired functional mobility, decreased lower extremity function, pain .    Rehab Diagnosis:     Recent Surgery: * No surgery found *      General Precautions: Standard, fall     Orthopedic Precautions:LLE weight bearing as tolerated, LLE posterior precautions     Braces: N/A    Rehab Prognosis: Good; patient would benefit from acute skilled PT services to address these deficits and reach maximum level of function.      History:     Past Medical History:   Diagnosis Date    COPD (chronic obstructive pulmonary disease)     Depression     Hypertension     Insomnia     Lumbar degenerative disc disease     Mixed hyperlipidemia        Past Surgical History:   Procedure Laterality Date    cyst on tailbone      HEMIARTHROPLASTY, HIP, POSTERIOR APPROACH Left 9/21/2022    Procedure: HEMIARTHROPLASTY, HIP, POSTERIOR APPROACH;  Surgeon: Christos Christensen MD;  Location: [facility];  Service: Orthopedics;  Laterality: Left;    right hip replacement         Subjective     Chief Complaint:   Patient/Family Comments/goals:     Vitals   Vitals at Rest  BP    HR    O2 Sat    Pain      Vitals With Activity  BP    HR    O2 Sat    Pain      Respiratory Status: Room air    Patients cultural, spiritual, Buddhism conflicts given the current situation:        Objective:     Patient found up in chair with    upon PT entry to room.    Pt is Oriented x3 and Alert, Cooperative, and Motivated.       Current   Status Comments  Discharge   Goal    Functional Area: Care Score:     Roll Left and Right    Independent   Sit to Lying    Independent   Lying to Sitting on Side of Bed    Independent   Sit to Stand 6 W RW Independent   Chair/Bed-to-Chair Transfer    Independent   Car Transfer    Independent   Walk 10 Feet 6 W RW Independent   Walk 50 Feet with Two Turns 6 W RW Independent   Walk 150 Feet 6 150ft + 100ft +75ft w RW  Independent   Walk 10 Feet Uneven Surface    Independent   1 Step (Curb) 4  Independent   4 Steps   3 steps w CGA, side stepping w descent Independent   12 Steps    Independent   Picking Up Object        Wheel 50 Feet with Two Turns        Wheel 150 Feet            Therapeutic Activities and Exercises:   Family present for FT. Educated sister on improvements throughout therapy w IRF and assistance required upon discharge. Reviewed and performed seated HEP.     Activity Tolerance    Patient left up in chair with  sister present.    Education provided: roles and goals of PT/PTA, transfer training, gait training, and stair training    Expected compliance:    GOALS:   Multidisciplinary Problems       Physical Therapy Goals          Problem: Physical Therapy    Goal Priority Disciplines Outcome Goal Variances Interventions   Physical Therapy Goal     PT, PT/OT Ongoing, Progressing     Description: Short Term goals      Bed Mobility   Roll Right and Left Independent (MET)  Lying to sitting Independent (MET)  Sitting to lying Independent (MET)    Transfers    Pt will be able to perform Stand step chair/bed to chair transfer With RW Independent (MET)  Pt will be able to perform Sit to stand with RW Independent (MET)  Pt will be able to perform Car transfer with RW Independent (MET)    Ambulation    Pt will ambulate 500 Feet with RW Independent (Progressing)  Pt will be able to ascend/descend 12 stairs using 1HR Rails Independent (Progressing)  Pt will be able to ascend/descend 6 inch curb using RW Independent (Progressing)  Pt will be able to  ambulate uneven surfaces/ramp 10 feet with RW Independent (Progressing)          Timeframe: By Discharge                          Plan:     During this hospitalization, patient to be seen 5 x/week to address the identified rehab impairments via gait training, therapeutic activities, therapeutic exercises, neuromuscular re-education and progress toward the following goals:    Plan of Care Expires:  09/30/22    Additional Information:         Time Tracking:     PT Received On:    Time In 1030     Time Out 1100  Total Time 30 min  Therapy Time PT Individual: 30  Missed Time:    Time Missed due to:      Billable Minutes: Gait Training 15 and Therapeutic Activity 15    10/03/2022

## 2022-10-03 NOTE — PT/OT/SLP DISCHARGE
Occupational Therapy Discharge Summary    Yoan Xavier  MRN: [id number]   Principal Problem: Closed displaced midcervical fracture of left femur with routine healing      Patient Discharged from acute Occupational Therapy on 10/03/2022.  Please refer to prior OT note dated 09/30/2022 for functional status.    Assessment:      Patient appropriate for care in another setting.    Objective:     GOALS:   Multidisciplinary Problems       Occupational Therapy Goals          Problem: Occupational Therapy    Goal Priority Disciplines Outcome Interventions   Occupational Therapy Goal     OT, PT/OT                         Reasons for Discontinuation of Therapy Services  Transfer to alternate level of care.      Plan:     Patient Discharged to: Home with Home Health Service and prior Assisted Living Apartment.  Pt. Met all ADL goals.     10/3/2022

## 2022-10-03 NOTE — PT/OT/SLP DISCHARGE
Physical Therapy Discharge Summary    Name: Yoan Xavier  MRN: [id number]   Principal Problem: Closed displaced midcervical fracture of left femur with routine healing     Patient Discharged from acute Physical Therapy on 10/03/2022 .  Please refer to prior PT noted date on 10/03/2022  for functional status.     Assessment:     Pt discharging from IRF to assisted living w majority of goals met.     Objective:     GOALS:   Multidisciplinary Problems       Physical Therapy Goals          Problem: Physical Therapy    Goal Priority Disciplines Outcome Goal Variances Interventions   Physical Therapy Goal     PT, PT/OT Ongoing, Progressing     Description: Short Term goals      Bed Mobility   Roll Right and Left Independent (MET)  Lying to sitting Independent (MET)  Sitting to lying Independent (MET)    Transfers    Pt will be able to perform Stand step chair/bed to chair transfer With RW Independent (MET)  Pt will be able to perform Sit to stand with RW Independent (MET)  Pt will be able to perform Car transfer with RW Independent (MET)    Ambulation    Pt will ambulate 500 Feet with RW Independent (Progressing)  Pt will be able to ascend/descend 12 stairs using 1HR Rails Independent (Progressing)  Pt will be able to ascend/descend 6 inch curb using RW Independent (Progressing)  Pt will be able to ambulate uneven surfaces/ramp 10 feet with RW Independent (Progressing)          Timeframe: By Discharge                          Care Scores:   Admission Assessment Current   Status  Discharge   Goal   Functional Area: Care Score:  Care Score:    Roll Left and Right 6 6 Independent   Sit to Lying 3 6 Independent   Lying to Sitting on Side of Bed 4 6 Independent   Sit to Stand 4 6 Independent   Chair/Bed-to-Chair Transfer 4 6 Independent   Car Transfer 4 6 Independent   Walk 10 Feet 4 6 Independent   Walk 50 Feet with Two Turns 4 6 Independent   Walk 150 Feet 4 6 Independent   Walk 10 Feet Uneven Surface 4 4 Independent   1  Step (Curb) 4 4 Independent   4 Steps 4 4 Independent   12 Steps 4 4 Independent   Picking Up Object 88 4  4   Wheel 50 Feet with Two Turns 9 6  9   Wheel 150 Feet 9 6  9       Reasons for Discontinuation of Therapy Services  Discharging from IRF to home       Plan:     Patient Discharged to: Home with Home Health Service.    10/3/2022

## 2022-10-03 NOTE — DISCHARGE SUMMARY
[facility] ([facility])  Rehab Discharge Summary    Patient Name: Yoan Xavier  MRN: [id number]  Age: 81 y.o. Sex: female  : 1940  Hospital Length of Stay: 10 days   Date of Service: 10/3/2022      Discharge Information   Date of Admission: 2022  Date of Discharge: 10/3/2022  Admit Diagnosis: Left femoral neck fracture 2/2 fall           GERD         HTN         HLD         Major depressive disorder         Constipation         Insomnia          COPD         Tobacco abuse         Normocytic anemia   Discharge Diagnosis: Left femoral neck fracture 2/2 fall (stable)           GERD (stable)           HTN (stable)           HLD (stable)           Major depressive disorder (stable)           Constipation (stable)           Insomnia  (stable)           COPD (stable)           Tobacco abuse (stable)           Normocytic anemia (stable)           Sinus congestion (improved)    COVID-19 testing: Negative 2022  COVID-19 vaccination status:  Unvaccinated     Internal Medicine (attending): Vasiliy Connell MD  Physiatry (consulting):  Vernon Manrique MD     OUTPATIENT PROVIDERS  PCP: Ronal Zuluaga MD  Orthopedic surgery:  Christos Christensen MD    Hospital Course   81-year-old WF presented to [facility] on  complaints of left hip pain after falling doing laundry twelve days prior.  PMH significant for hypertension and hyperlipidemia.  Workup significant for left femoral neck fracture.  On  tolerated left hemiarthroplasty without perioperative complications. Tolerated transfer to [facility] inpatient rehab unit on  without incident.    Throughout inpatient rehab course remained continent of bowel and bladder.  On  constipation resolved with initiation of Dulcolax suppository once.  On  initiated Zyrtec and Flonase 2/2 sinus congestion symptoms.  Throughout remainder of inpatient rehab course sinus congestion resolved.  Bowel management, sleep hygiene, and appetite  "remained at goal.  Continued to progress well with therapies.  Labs today reviewed. CMP unremarkable. Albumin and prealbumin trending up. H&H stable. Staffing today documented below.    Staffing 10/3: Continent of bowel and bladder. PT: overall independent. Walking with RW. Good appetite. OT: overall modified independent. Family training went well. RT: overall set up to independent. Staples to be removed tomorrow with HH.    Chief Complaint: Left femoral neck fracture 2/2 fall s/p left hip hemiarthroplasty on 09/21/2022    /66   Pulse (!) 58   Temp 97.5 °F (36.4 °C) (Oral)   Resp 19   Ht 5' 8" (1.727 m)   Wt 64.2 kg (141 lb 8.6 oz)   SpO2 (!) 94%   BMI 21.52 kg/m²      Physical Exam  Constitutional:       Appearance: Normal appearance.   HENT:      Head: Normocephalic.      Mouth/Throat:      Mouth: Mucous membranes are moist.   Eyes:      Pupils: Pupils are equal, round, and reactive to light.   Cardiovascular:      Rate and Rhythm: Normal rate and regular rhythm.      Heart sounds: Normal heart sounds.   Pulmonary:      Effort: Pulmonary effort is normal.      Breath sounds: Normal breath sounds.   Abdominal:      General: Bowel sounds are normal.      Palpations: Abdomen is soft.   Musculoskeletal:      Cervical back: Neck supple.      Comments: Generalized weakness and muscle atrophy.  Left hip dressing clean and intact.   Skin:     General: Skin is warm and dry.   Neurological:      General: No focal deficit present.      Mental Status: She is alert and oriented to person, place, and time.   Psychiatric:         Mood and Affect: Mood normal.         Behavior: Behavior normal.         Thought Content: Thought content normal.         Judgment: Judgment normal.   *MD performed and documented physical examination          Labs  Recent Results (from the past 24 hour(s))   Comprehensive Metabolic Panel    Collection Time: 10/03/22  5:06 AM   Result Value Ref Range    Sodium Level 139 136 - 145 mmol/L    " Potassium Level 4.5 3.5 - 5.1 mmol/L    Chloride 103 98 - 107 mmol/L    Carbon Dioxide 28 23 - 31 mmol/L    Glucose Level 83 82 - 115 mg/dL    Blood Urea Nitrogen 19.8 9.8 - 20.1 mg/dL    Creatinine 0.71 0.55 - 1.02 mg/dL    Calcium Level Total 9.7 8.4 - 10.2 mg/dL    Protein Total 6.3 5.8 - 7.6 gm/dL    Albumin Level 2.7 (L) 3.4 - 4.8 gm/dL    Globulin 3.6 (H) 2.4 - 3.5 gm/dL    Albumin/Globulin Ratio 0.8 (L) 1.1 - 2.0 ratio    Bilirubin Total 0.5 <=1.5 mg/dL    Alkaline Phosphatase 68 40 - 150 unit/L    Alanine Aminotransferase 13 0 - 55 unit/L    Aspartate Aminotransferase 11 5 - 34 unit/L    eGFR >60 mls/min/1.73/m2   Prealbumin    Collection Time: 10/03/22  5:06 AM   Result Value Ref Range    Prealbumin 15.9 14.0 - 37.0 mg/dL   Magnesium    Collection Time: 10/03/22  5:06 AM   Result Value Ref Range    Magnesium Level 1.90 1.60 - 2.60 mg/dL   Phosphorus    Collection Time: 10/03/22  5:06 AM   Result Value Ref Range    Phosphorus Level 2.9 2.3 - 4.7 mg/dL   CBC with Differential    Collection Time: 10/03/22  5:06 AM   Result Value Ref Range    WBC 10.6 4.5 - 11.5 x10(3)/mcL    RBC 3.41 (L) 4.20 - 5.40 x10(6)/mcL    Hgb 10.1 (L) 12.0 - 16.0 gm/dL    Hct 31.0 (L) 37.0 - 47.0 %    MCV 90.9 80.0 - 94.0 fL    MCH 29.6 27.0 - 31.0 pg    MCHC 32.6 (L) 33.0 - 36.0 mg/dL    RDW 13.8 11.5 - 17.0 %    Platelet 403 (H) 130 - 400 x10(3)/mcL    MPV 8.9 7.4 - 10.4 fL    Neut % 76.9 %    Lymph % 14.7 %    Mono % 6.4 %    Eos % 1.3 %    Basophil % 0.4 %    Lymph # 1.56 0.6 - 4.6 x10(3)/mcL    Neut # 8.2 2.1 - 9.2 x10(3)/mcL    Mono # 0.68 0.1 - 1.3 x10(3)/mcL    Eos # 0.14 0 - 0.9 x10(3)/mcL    Baso # 0.04 0 - 0.2 x10(3)/mcL    IG# 0.03 0 - 0.04 x10(3)/mcL    IG% 0.3 %    NRBC% 0.0 %     Radiology  Left hip x-ray 9/22:  Good position of left hip prosthesis    Discharge Summary Plan   Discharge Status: improved    Location: Discharge home with     Medications: See discharge medicine reconciliation    Activity: As  tolerated    Diet: Regular diet    Instructions:  Take all medications as prescribed.      Attend appointments as scheduled.      Return to ED if symptoms worsen, or if t > 100.4.    Education: Left femoral neck fracture, GERD, HTN    Follow-up:  Ronal Zuluaga MD on 10/5 @ 930      Christos Christensen MD on 10/6 @ 230pm    Discussed plan of care, and patient communicated understanding. Agreed to comply with recommendations.    Olga Herbert NP conducted independent examination and assisted with medical documentation.     Discharge Time: 54 minutes

## 2022-10-03 NOTE — PT/OT/SLP PROGRESS
"Occupational Therapy Inpatient Rehab Treatment    Name: Yoan Xavier  MRN: [id number]    Assessment:  Yoan Xavier is a 81 y.o. female admitted with a medical diagnosis of Closed displaced midcervical fracture of left femur with routine healing.  She presents with the following impairments/functional limitations:  decreased lower extremity function, orthopedic precautions .    General Precautions: Standard, fall     Orthopedic Precautions:LLE weight bearing as tolerated, LLE posterior precautions     Braces: N/A    Rehab Prognosis: Good; patient would benefit from acute skilled OT services to address these deficits and reach maximum level of function.      History:     Past Medical History:   Diagnosis Date    COPD (chronic obstructive pulmonary disease)     Depression     Hypertension     Insomnia     Lumbar degenerative disc disease     Mixed hyperlipidemia        Past Surgical History:   Procedure Laterality Date    cyst on tailbone      HEMIARTHROPLASTY, HIP, POSTERIOR APPROACH Left 9/21/2022    Procedure: HEMIARTHROPLASTY, HIP, POSTERIOR APPROACH;  Surgeon: Christos Christensen MD;  Location: [facility];  Service: Orthopedics;  Laterality: Left;    right hip replacement         Subjective     Orientation: Oriented x4    Chief Complaint: "I know that already"    Patient/Family Comments/goals: "go back to my apartment"  Respiratory Status: Room air    Patients cultural, spiritual, Yazidi conflicts given the current situation: no       Objective:     Patient found up in chair with   Bags packed upon OT entry to room.    Mobility   Patient completed:  Sit to Stand Transfer with independence with rolling walker  Stand to Sit Transfer with independence with rolling walker  Toilet Transfer Step Transfer technique with independence with  rolling walker and bedside commode    Functional Mobility  Independent    ADLs   Current Status   Eating 6   Oral Hygiene 6   Shower, Bathe Self 5   Upper Body Dressing 6   Lower " Body Dressing 5   Toileting Hygiene 6   Toilet Transfer 6   Putting On, Taking Off Footwear 5     Limiting Factors for ADLs: endurance and chronic back pain            LifeStyle Change and Education:             Patient left up in chair with call button in reach and sister present.     Education provided: ADLs, transfer training, body mechanics, assistive device, and post-op precautions    Multidisciplinary Problems       Occupational Therapy Goals          Problem: Occupational Therapy    Goal Priority Disciplines Outcome Interventions   Occupational Therapy Goal     OT, PT/OT                         Time Tracking     OT Received On: 10/03/22  Time In 1000     Time Out 1020  Total Time 20 min  Therapy Time: OT Individual: 20  Missed Time:  0  Missed Time Reason:      Billable Minutes: Self Care/Home Management 20    10/03/2022

## 2022-10-03 NOTE — PT/OT/SLP DISCHARGE
Occupational Therapy Discharge Summary    Yoan Xavier  MRN: [id number]   Principal Problem: Closed displaced midcervical fracture of left femur with routine healing      Patient Discharged from acute Occupational Therapy on 10/3/2022.  Please refer to prior OT note dated 10/28/2022 for functional status.    Assessment:      Goals partially met. Patient appropriate for care in another setting.    Objective:     GOALS:   Multidisciplinary Problems       Occupational Therapy Goals          Problem: Occupational Therapy    Goal Priority Disciplines Outcome Interventions   Occupational Therapy Goal     OT, PT/OT                         Reasons for Discontinuation of Therapy Services  Transfer to alternate level of care. and Satisfactory goal achievement.      Plan:     Patient Discharged to: Home with Home Health Service and Assisted Living    10/3/2022

## 2022-10-03 NOTE — PT/OT/SLP DISCHARGE
Physical Therapy Discharge Summary    Name: Yoan Xavier  MRN: [id number]   Principal Problem: Closed displaced midcervical fracture of left femur with routine healing       Assessment:     Goals partially met.    Objective:     GOALS:   Multidisciplinary Problems       Physical Therapy Goals          Problem: Physical Therapy    Goal Priority Disciplines Outcome Goal Variances Interventions   Physical Therapy Goal     PT, PT/OT Ongoing, Progressing     Description: Short Term goals      Bed Mobility   Roll Right and Left Independent (MET)  Lying to sitting Independent (MET)  Sitting to lying Independent (MET)    Transfers    Pt will be able to perform Stand step chair/bed to chair transfer With RW Independent (MET)  Pt will be able to perform Sit to stand with RW Independent (MET)  Pt will be able to perform Car transfer with RW Independent (MET)    Ambulation    Pt will ambulate 500 Feet with RW Independent (NOT MET)  Pt will be able to ascend/descend 12 stairs using 1HR Rails Independent (NOT MET)  Pt will be able to ascend/descend 6 inch curb using RW Independent (Not MET)   Pt will be able to ambulate uneven surfaces/ramp 10 feet with RW Independent Not MET           Timeframe: By Discharge                          Care Scores:   Admission Assessment Current   Status  Discharge   Goal   Functional Area: Care Score:  Care Score:    Roll Left and Right 6 6 Independent   Sit to Lying 3 6 Independent   Lying to Sitting on Side of Bed 4 6 Independent   Sit to Stand 4 6 Independent   Chair/Bed-to-Chair Transfer 4 6 Independent   Car Transfer 4 6 Independent   Walk 10 Feet 4 6 Independent   Walk 50 Feet with Two Turns 4 6 Independent   Walk 150 Feet 4 6 Independent   Walk 10 Feet Uneven Surface 4 4 Independent   1 Step (Curb) 4 4 Independent   4 Steps 4 4 Independent   12 Steps 4 4 Independent   Picking Up Object 88 4     Wheel 50 Feet with Two Turns 9 6     Wheel 150 Feet 9 6         Reasons for Discontinuation of  Therapy Services  Satisfactory goal achievement.      Plan:     Patient Discharged to:  assistive living facility .    10/3/2022

## 2022-10-03 NOTE — PT/OT/SLP DISCHARGE
Occupational Therapy Discharge Summary    Yoan Xavier  MRN: [id number]   Principal Problem: Closed displaced midcervical fracture of left femur with routine healing      Patient Discharged from acute Occupational Therapy on 10/03/2022.  Please refer to prior OT note dated 10/03/2022 for functional status.    Assessment:      Goals partially met. Patient appropriate for care in another setting.    Objective:     GOALS:   Multidisciplinary Problems       Occupational Therapy Goals          Problem: Occupational Therapy    Goal Priority Disciplines Outcome Interventions   Occupational Therapy Goal     OT, PT/OT                         Reasons for Discontinuation of Therapy Services  Transfer to alternate level of care. and Satisfactory goal achievement.      Plan:     Patient Discharged to: Home with Home Health Service    10/3/2022

## 2022-10-13 ENCOUNTER — OFFICE VISIT (OUTPATIENT)
Dept: ORTHOPEDICS | Facility: CLINIC | Age: 82
End: 2022-10-13
Payer: MEDICARE

## 2022-10-13 ENCOUNTER — HOSPITAL ENCOUNTER (OUTPATIENT)
Dept: RADIOLOGY | Facility: CLINIC | Age: 82
Discharge: HOME | End: 2022-10-13
Attending: ORTHOPAEDIC SURGERY
Payer: MEDICARE

## 2022-10-13 VITALS
WEIGHT: 140.81 LBS | BODY MASS INDEX: 21.34 KG/M2 | HEIGHT: 68 IN | SYSTOLIC BLOOD PRESSURE: 124 MMHG | DIASTOLIC BLOOD PRESSURE: 57 MMHG | HEART RATE: 68 BPM

## 2022-10-13 DIAGNOSIS — Z96.642 AFTERCARE FOLLOWING LEFT HIP JOINT REPLACEMENT SURGERY: ICD-10-CM

## 2022-10-13 DIAGNOSIS — Z96.642 AFTERCARE FOLLOWING LEFT HIP JOINT REPLACEMENT SURGERY: Primary | ICD-10-CM

## 2022-10-13 DIAGNOSIS — Z47.1 AFTERCARE FOLLOWING LEFT HIP JOINT REPLACEMENT SURGERY: ICD-10-CM

## 2022-10-13 DIAGNOSIS — Z47.1 AFTERCARE FOLLOWING LEFT HIP JOINT REPLACEMENT SURGERY: Primary | ICD-10-CM

## 2022-10-13 PROCEDURE — 99024 PR POST-OP FOLLOW-UP VISIT: ICD-10-PCS | Mod: POP,,, | Performed by: ORTHOPAEDIC SURGERY

## 2022-10-13 PROCEDURE — 99024 POSTOP FOLLOW-UP VISIT: CPT | Mod: POP,,, | Performed by: ORTHOPAEDIC SURGERY

## 2022-10-13 PROCEDURE — 73502 XR HIP WITH PELVIS WHEN PERFORMED, 2 OR 3 VIEWS LEFT: ICD-10-PCS | Mod: LT,,, | Performed by: ORTHOPAEDIC SURGERY

## 2022-10-13 PROCEDURE — 73502 X-RAY EXAM HIP UNI 2-3 VIEWS: CPT | Mod: LT,,, | Performed by: ORTHOPAEDIC SURGERY

## 2022-10-13 RX ORDER — BUDESONIDE AND FORMOTEROL FUMARATE DIHYDRATE 160; 4.5 UG/1; UG/1
2 AEROSOL RESPIRATORY (INHALATION) EVERY 12 HOURS
COMMUNITY

## 2022-10-13 RX ORDER — DEXTROMETHORPHAN HYDROBROMIDE, GUAIFENESIN 5; 100 MG/5ML; MG/5ML
650 LIQUID ORAL EVERY 8 HOURS
COMMUNITY

## 2022-10-14 NOTE — PROGRESS NOTES
Past Medical History:   Diagnosis Date    COPD (chronic obstructive pulmonary disease)     Depression     Hypertension     Insomnia     Lumbar degenerative disc disease     Mixed hyperlipidemia        Past Surgical History:   Procedure Laterality Date    cyst on tailbone      HEMIARTHROPLASTY, HIP, POSTERIOR APPROACH Left 9/21/2022    Procedure: HEMIARTHROPLASTY, HIP, POSTERIOR APPROACH;  Surgeon: Christos Christensen MD;  Location: [facility];  Service: Orthopedics;  Laterality: Left;    right hip replacement         Current Outpatient Medications   Medication Sig    acetaminophen (TYLENOL) 650 MG TbSR Take 650 mg by mouth every 8 (eight) hours.    amLODIPine (NORVASC) 5 MG tablet Take 1 tablet (5 mg total) by mouth once daily.    atorvastatin (LIPITOR) 40 MG tablet Take 1 tablet (40 mg total) by mouth once daily.    budesonide-formoterol 160-4.5 mcg (SYMBICORT) 160-4.5 mcg/actuation HFAA Inhale 2 puffs into the lungs every 12 (twelve) hours. Controller    buPROPion (WELLBUTRIN XL) 300 MG 24 hr tablet Take 1 tablet (300 mg total) by mouth once daily.    carvediloL (COREG) 12.5 MG tablet Take 1 tablet (12.5 mg total) by mouth 2 (two) times daily.    mirtazapine (REMERON) 30 MG tablet Take 1 tablet (30 mg total) by mouth every evening.    quinapriL (ACCUPRIL) 40 MG tablet Take 0.5 tablets (20 mg total) by mouth once daily.    aspirin 81 MG Chew Take 1 tablet (81 mg total) by mouth 2 (two) times daily. (Patient not taking: Reported on 10/13/2022)    cetirizine (ZYRTEC) 10 MG tablet Take 1 tablet (10 mg total) by mouth once daily. (Patient not taking: Reported on 10/13/2022)    famotidine (PEPCID) 20 MG tablet Take 1 tablet (20 mg total) by mouth 2 (two) times daily. (Patient not taking: Reported on 10/13/2022)    fluticasone furoate-vilanteroL (BREO) 200-25 mcg/dose DsDv diskus inhaler Inhale 1 puff into the lungs once daily. Controller (Patient not taking: Reported on 10/13/2022)    fluticasone propionate (FLONASE) 50  mcg/actuation nasal spray 2 sprays (100 mcg total) by Each Nostril route 2 (two) times a day. (Patient not taking: Reported on 10/13/2022)    gabapentin (NEURONTIN) 300 MG capsule Take 1 capsule (300 mg total) by mouth every evening. (Patient not taking: Reported on 10/13/2022)     No current facility-administered medications for this visit.       Review of patient's allergies indicates:  No Known Allergies    Family History   Problem Relation Age of Onset    Hypertension Mother     Hypertension Father        Social History     Socioeconomic History    Marital status: Single   Tobacco Use    Smoking status: Every Day     Packs/day: 1.00     Types: Cigarettes    Smokeless tobacco: Never   Substance and Sexual Activity    Alcohol use: Not Currently     Comment: 3 glass wine a year    Drug use: Never    Sexual activity: Not Currently     Social Determinants of Health     Transportation Needs: No Transportation Needs    Lack of Transportation (Medical): No    Lack of Transportation (Non-Medical): No       Chief Complaint:   Chief Complaint   Patient presents with    Post-op Evaluation     Lt hemiarthroplasty 09/21/22- Pt states she is doing fine after her procedure. Denies pain. No complaints       History of present illness:  A 81-year-old female status post left hip hemiarthroplasty.  Patient is almost a month out from surgery.  She is now back in her assisted living.  Pain is minimal.  Walking with a rolling walker.  Happy with her recovery thus far.      Review of Systems:    Constitution: Negative for chills, fever, and sweats.  Negative for unexplained weight loss.    HENT:  Negative for headaches and blurry vision.    Cardiovascular:Negative for chest pain or irregular heart beat. Negative for hypertension.    Respiratory:  Negative for cough and shortness of breath.    Gastrointestinal: Negative for abdominal pain, heartburn, melena, nausea, and vomitting.    Genitourinary:  Negative bladder incontinence and  dysuria.    Musculoskeletal:  See HPI    Neurological: Negative for numbness.    Psychiatric/Behavioral: Negative for depression.  The patient is not nervous/anxious.      Endocrine: Negative for polyuria    Hematologic/Lymphatic: Negative for bleeding problem.  Does not bruise/bleed easily.    Skin: Negative for poor would healing and rash      Physical Examination:    Vital Signs:    Vitals:    10/13/22 1614   BP: (!) 124/57   Pulse: 68       Body mass index is 21.41 kg/m².    Left hip:  Incision clean dry intact.  Range of motion left hip without significant pain or discomfort.    X-rays:  Three views left hip reviewed.  Patient's implants well fixed.  No signs of loosening or subsidence noted.     Assessment::  He status post left hip hemiarthroplasty he    Plan:  Overall patient doing well.  We will plan to see her back on an as-needed basis.  Patient can return at any issues questions or concerns.    This note was created using [employer] voice recognition software that occasionally misinterpreted phrases or words.    Consult note is delivered via Epic messaging service.

## 2023-07-01 NOTE — PROGRESS NOTES
[facility] ([facility])  Rehab Progress Note    Patient Name: Yoan Xavier  MRN: [id number]  Age: 81 y.o. Sex: female  : 1940  Hospital Length of Stay: 3 days  Date of Service: 2022   Chief Complaint: Left femoral neck fracture 2/2 fall s/p left hip hemiarthroplasty on 2022    Subjective:     Basic Information  Admit Information: 81-year-old WF presented to [facility] on  complaints of left hip pain after falling doing laundry twelve days prior.  PMH significant for hypertension and hyperlipidemia.  Workup significant for left femoral neck fracture.  On  tolerated left hemiarthroplasty without perioperative complications. Tolerated transfer to [facility] inpatient rehab unit on  without incident.  Today's Information: No acute events overnight.  Laying on PT gym bed comfortably working with PT.  Reports good sleep and appetite.  Last BM .  Vital signs at goal with no recent recorded fevers.  Labs reviewed, CMP unremarkable.  Albumin and pre-albumin trending up, H&H stable, otherwise unremarkable.  No imaging today.    Review of patient's allergies indicates:  No Known Allergies     Current Facility-Administered Medications:     acetaminophen tablet 500 mg, 500 mg, Oral, Q4H, Olga Herbert, FNP, 500 mg at 22 0508    amLODIPine tablet 5 mg, 5 mg, Oral, Daily, Olga Herbert, FNP, 5 mg at 22 0859    aspirin chewable tablet 81 mg, 81 mg, Oral, BID, Olga Herbert, FNP, 81 mg at 22    atorvastatin tablet 40 mg, 40 mg, Oral, Daily, Olga Herbert, FNP, 40 mg at 22 0900    benzonatate capsule 100 mg, 100 mg, Oral, TID PRN, Olga Herbert FNP    buPROPion TB24 tablet 300 mg, 300 mg, Oral, Daily, Olga Herbert, FNP, 300 mg at 22 09    carvediloL tablet 12.5 mg, 12.5 mg, Oral, BID, Olga Herbert, FNP, 12.5 mg at 22    docusate sodium capsule 100 mg, 100 mg, Oral, BID, WILFRID Cotter, 100 mg at  "09/25/22 2007    enoxaparin injection 40 mg, 40 mg, Subcutaneous, Daily, WILFRID Cotter, 40 mg at 09/25/22 1644    famotidine tablet 20 mg, 20 mg, Oral, BID, Olga Herbert, XOCHILTP, 20 mg at 09/25/22 2005    famotidine tablet 20 mg, 20 mg, Oral, BID PRN, Olga Herbert FNP    fluticasone furoate-vilanteroL 200-25 mcg/dose diskus inhaler 1 puff, 1 puff, Inhalation, Daily **AND** MDI Daily, , , Daily, WILFRID Cotter    gabapentin capsule 300 mg, 300 mg, Oral, QHS, XOCHILT CotterP, 300 mg at 09/25/22 2005    hydrALAZINE injection 10 mg, 10 mg, Intravenous, Q6H PRN, XOCHILT CotterP, 10 mg at 09/24/22 0456    hydrOXYzine pamoate capsule 50 mg, 50 mg, Oral, Nightly PRN, XOCHILT CotterP, 50 mg at 09/25/22 2006    labetalol 20 mg/4 mL (5 mg/mL) IV syring, 10 mg, Intravenous, Q4H PRN, WILFRID Cotter    lisinopriL tablet 20 mg, 20 mg, Oral, Daily, Robert A Chuy, FNP, 20 mg at 09/25/22 0900    methocarbamoL tablet 750 mg, 750 mg, Oral, QID PRN, WILFRID Cotter    metoprolol injection 10 mg, 10 mg, Intravenous, Q2H PRN, WILFRID Cotter    mirtazapine tablet 30 mg, 30 mg, Oral, QHS, Olga Herbert FNP, 30 mg at 09/25/22 2006    nitroGLYCERIN SL tablet 0.4 mg, 0.4 mg, Sublingual, Q5 Min PRN, WILFRID Cotter    ondansetron disintegrating tablet 4 mg, 4 mg, Oral, Q6H PRN, WILFRID Cotter    polyethylene glycol packet 17 g, 17 g, Oral, QHS, XOCHILT CotterP, 17 g at 09/25/22 2007    traMADoL tablet 50 mg, 50 mg, Oral, Q4H PRN, Olga Herbert, WILFRID     Review of Systems   Complete 12-point review of symptoms negative except for what's mentioned in HPI     Objective:     BP (!) 109/59   Pulse 74   Temp 97.9 °F (36.6 °C)   Resp 16   Ht 5' 8" (1.727 m)   Wt 67.7 kg (149 lb 4 oz)   SpO2 (!) 55%   BMI 22.69 kg/m²        Intake/Output Summary (Last 24 hours) at 9/26/2022 1104  Last data filed at 9/26/2022 0500  Gross per 24 hour   Intake 840 ml   Output " --   Net 840 ml         Physical Exam  Constitutional:       Appearance: Normal appearance.   HENT:      Head: Normocephalic.      Mouth/Throat:      Mouth: Mucous membranes are moist.   Eyes:      Pupils: Pupils are equal, round, and reactive to light.   Cardiovascular:      Rate and Rhythm: Normal rate and regular rhythm.      Heart sounds: Normal heart sounds.   Pulmonary:      Effort: Pulmonary effort is normal.      Breath sounds: Normal breath sounds.   Abdominal:      General: Bowel sounds are normal.      Palpations: Abdomen is soft.   Musculoskeletal:      Cervical back: Neck supple.      Comments: Generalized weakness and muscle atrophy.  Left hip dressing clean and intact.   Skin:     General: Skin is warm and dry.   Neurological:      General: No focal deficit present.      Mental Status: She is alert and oriented to person, place, and time.   Psychiatric:         Mood and Affect: Mood normal.         Behavior: Behavior normal.         Thought Content: Thought content normal.         Judgment: Judgment normal.     Lines/Drains/Airways       Epidural Line  Duration                  Neuraxial Analgesia/Anesthesia Assessment (using dermatomes) Epidural 09/21/22 1236 4 days         Neuraxial Analgesia/Anesthesia Assessment (using dermatomes) Epidural 09/21/22 1236 4 days                    Labs  Recent Results (from the past 24 hour(s))   Comprehensive Metabolic Panel    Collection Time: 09/26/22  5:26 AM   Result Value Ref Range    Sodium Level 139 136 - 145 mmol/L    Potassium Level 4.4 3.5 - 5.1 mmol/L    Chloride 103 98 - 107 mmol/L    Carbon Dioxide 29 23 - 31 mmol/L    Glucose Level 87 82 - 115 mg/dL    Blood Urea Nitrogen 11.0 9.8 - 20.1 mg/dL    Creatinine 0.62 0.55 - 1.02 mg/dL    Calcium Level Total 9.6 8.4 - 10.2 mg/dL    Protein Total 6.0 5.8 - 7.6 gm/dL    Albumin Level 2.5 (L) 3.4 - 4.8 gm/dL    Globulin 3.5 2.4 - 3.5 gm/dL    Albumin/Globulin Ratio 0.7 (L) 1.1 - 2.0 ratio    Bilirubin Total  0.6 <=1.5 mg/dL    Alkaline Phosphatase 50 40 - 150 unit/L    Alanine Aminotransferase 13 0 - 55 unit/L    Aspartate Aminotransferase 19 5 - 34 unit/L    eGFR >60 mls/min/1.73/m2   Prealbumin    Collection Time: 09/26/22  5:26 AM   Result Value Ref Range    Prealbumin 10.8 (L) 14.0 - 37.0 mg/dL   Magnesium    Collection Time: 09/26/22  5:26 AM   Result Value Ref Range    Magnesium Level 1.60 1.60 - 2.60 mg/dL   Phosphorus    Collection Time: 09/26/22  5:26 AM   Result Value Ref Range    Phosphorus Level 3.2 2.3 - 4.7 mg/dL   CBC with Differential    Collection Time: 09/26/22  5:26 AM   Result Value Ref Range    WBC 9.6 4.5 - 11.5 x10(3)/mcL    RBC 3.64 (L) 4.20 - 5.40 x10(6)/mcL    Hgb 10.5 (L) 12.0 - 16.0 gm/dL    Hct 32.0 (L) 37.0 - 47.0 %    MCV 87.9 80.0 - 94.0 fL    MCH 28.8 27.0 - 31.0 pg    MCHC 32.8 (L) 33.0 - 36.0 mg/dL    RDW 13.8 11.5 - 17.0 %    Platelet 353 130 - 400 x10(3)/mcL    MPV 8.9 7.4 - 10.4 fL    Neut % 75.0 %    Lymph % 12.4 %    Mono % 10.1 %    Eos % 1.9 %    Basophil % 0.3 %    Lymph # 1.19 0.6 - 4.6 x10(3)/mcL    Neut # 7.2 2.1 - 9.2 x10(3)/mcL    Mono # 0.97 0.1 - 1.3 x10(3)/mcL    Eos # 0.18 0 - 0.9 x10(3)/mcL    Baso # 0.03 0 - 0.2 x10(3)/mcL    IG# 0.03 0 - 0.04 x10(3)/mcL    IG% 0.3 %    NRBC% 0.0 %     Radiology  Left hip x-ray 9/22:  Good position of left hip prosthesis    Assessment/Plan:     81 y.o. WM admitted on 9/23/2022    Left femoral neck fracture 2/2 fall   - s/p left hip hemiarthroplasty on 09/21/2022  - LLE WBAT  - continue                Aspirin 81 mg b.i.d.                 Methocarbamol 750 mg 4 times daily p.r.n. muscle spasms                 Gabapentin 300 mg at bedtime                Tylenol 500 mg every 4 hours                 Tramadol 50 mg every 4 hours p.r.n.  - follow-up outpatient with orthopedic surgery     GERD  - Avoid spicy foods, and nothing to eat or drink within x2 hours of bedtime or laying flat (water is ok)   - Avoid NSAIDs (Advil, ibuprofen,  naproxen...) and resendiz-2 inhibitors (Mobic, Celebrex)    - continue                Pepcid 20 mg b.i.d.     HTN  - BP mod control!!  - continue                Lisinopril 20 mg daily (increased 9/24)                Amlodipine 5 mg daily                 Coreg 12.5 mg b.i.d.                Hydralazine 10 mg every 2 hours as needed for BP > 160/90                Labetalol 10 mg every 2 hours as needed for BP > 160/90     HLD  - FLP with next labs  - continue                Atorvastatin 40 mg daily     Major depressive disorder  - In remission   - Continue                Wellbutrin 300 mg daily      Constipation  - Stable   - Continue                 MiraLax 17 g nightly                 Colace 100 mg b.i.d.     Insomnia   - Stable   - Continue                Mirtazapine 30 mg nightly      COPD   - Stable   - Continue                Symbicort 2 puffs daily     Tobacco abuse   - Current  - Smokes 1 pack of cigarettes daily  - Refuses nicotine patch   - Counseled on smoking cessation     Normocytic anemia  - asymptomatic  - H/H stable   - no evidence of active bleeds  - will closely monitor and transfuse if needed      VTE Prophylaxis:  Lovenox 40 mg daily  COVID-19 testing: Negative 9/23/2022  COVID-19 vaccination status:  Unvaccinated     POA: no  Living will: no  Contacts: Namrata Yu (sister) [phone]                     Lex Yu (brother-in-law) [phone]     CODE STATUS: Full  Internal Medicine (attending): Vasiliy Connell MD  Physiatry (consulting):  Vernon Manrique MD     OUTPATIENT PROVIDERS  PCP: Ronal Zuluaga MD  Orthopedic surgery:  Christos Christensen MD     DISPOSITION: Condition stable.  Vital signs at goal.  Labs reviewed.  H&H stable.  CMP unremarkable.  Albumin and pre-albumin trending up.  Last BM 9/23.  Initiate Dulcolax suppository once now, if no BM by 1400 give soapsuds enema.  Sleep hygiene and appetite and goal.  Continues to progress well with therapies.  Monitor closely.  Notify of any acute  changes.    TeleMedicine MD Visit (live NP visit)  Verbal consent obtained for live A/V Telehealth visit    Originating Site:  [facility] [address]  [place] [address]  Place of service of originating site: Inpatient Rehabilitation Facility  Distant Site: [address] [address]  Synchronous Communication Encounter Clock Time: 7 minutes    Olga Herbert NP conducted independent physical examination and assisted with medical documentation.     Total time spent on this encounter including MD + NP chart review and direct 1-on-1 patient interaction: 39 minutes (including TeleMedicine time)  Over 50% of this time was spent in counseling and coordination of care   Patient/Caregiver provided printed discharge information.

## (undated) DEVICE — SOL NACL IRR 1000ML BTL

## (undated) DEVICE — COVER TABLE HVY DTY 60X90IN

## (undated) DEVICE — GLOVE PROTEXIS HYDROGEL SZ6.5

## (undated) DEVICE — RETRIEVER SUTURE HEWSON DISP

## (undated) DEVICE — PILLOW ABDUCTION FOAM MED

## (undated) DEVICE — SUT VICRYL PLUS ANTIBACT

## (undated) DEVICE — APPLICATOR CHLORAPREP ORN 26ML

## (undated) DEVICE — GLOVE PROTEXIS HYDROGEL SZ8.5

## (undated) DEVICE — Device

## (undated) DEVICE — SOL NACL IRR 3000ML

## (undated) DEVICE — TAPE ADH MEDIPORE 4 X 10YDS

## (undated) DEVICE — GAUZE SPONGE 4X4 12PLY

## (undated) DEVICE — SUT VICRYL 2-0 36 CT-1

## (undated) DEVICE — DRAPE STERI U-SHAPED 47X51IN

## (undated) DEVICE — GLOVE 7.0 PROTEXIS PI BLUE

## (undated) DEVICE — SUT BLU BR 2 TAPERD NDL 1/2

## (undated) DEVICE — NDL ANES SPINAL 18X3.5ST 18G

## (undated) DEVICE — DRAPE FULL SHEET 70X100IN

## (undated) DEVICE — GLOVE PROTEXIS BLUE LATEX 8.5

## (undated) DEVICE — PAD ABD 8X10 STERILE

## (undated) DEVICE — BLADE SAG DUAL CUT 18X90X1.35

## (undated) DEVICE — ELECTRODE PATIENT RETURN DISP

## (undated) DEVICE — DRAPE SURG W/TWL 17 5/8X23

## (undated) DEVICE — GOWN POLY REINF X-LONG 2XL

## (undated) DEVICE — KIT TOTAL HIP HLGC

## (undated) DEVICE — KIT SURGICAL TURNOVER

## (undated) DEVICE — DRESSING XEROFORM FOIL PK 1X8

## (undated) DEVICE — GAUZE SPONGE 4'X4 12 PLY

## (undated) DEVICE — DRAPE INCISE IOBAN 2 23X23IN